# Patient Record
Sex: FEMALE | Race: WHITE | NOT HISPANIC OR LATINO | Employment: OTHER | URBAN - METROPOLITAN AREA
[De-identification: names, ages, dates, MRNs, and addresses within clinical notes are randomized per-mention and may not be internally consistent; named-entity substitution may affect disease eponyms.]

---

## 2017-06-18 ENCOUNTER — APPOINTMENT (OUTPATIENT)
Dept: RADIOLOGY | Facility: MEDICAL CENTER | Age: 62
DRG: 853 | End: 2017-06-18
Attending: HOSPITALIST
Payer: MEDICAID

## 2017-06-18 ENCOUNTER — RESOLUTE PROFESSIONAL BILLING HOSPITAL PROF FEE (OUTPATIENT)
Dept: HOSPITALIST | Facility: MEDICAL CENTER | Age: 62
End: 2017-06-18
Payer: MEDICAID

## 2017-06-18 ENCOUNTER — HOSPITAL ENCOUNTER (INPATIENT)
Facility: MEDICAL CENTER | Age: 62
LOS: 5 days | DRG: 853 | End: 2017-06-23
Attending: EMERGENCY MEDICINE | Admitting: HOSPITALIST
Payer: MEDICAID

## 2017-06-18 DIAGNOSIS — R44.3 HALLUCINATIONS: ICD-10-CM

## 2017-06-18 DIAGNOSIS — S41.111A: ICD-10-CM

## 2017-06-18 PROBLEM — N17.9 ARF (ACUTE RENAL FAILURE) (HCC): Status: ACTIVE | Noted: 2017-06-18

## 2017-06-18 PROBLEM — E87.29 INCREASED ANION GAP METABOLIC ACIDOSIS: Status: ACTIVE | Noted: 2017-06-18

## 2017-06-18 PROBLEM — S61.419A HAND LACERATION: Status: ACTIVE | Noted: 2017-06-18

## 2017-06-18 PROBLEM — C50.919 BREAST CANCER (HCC): Status: ACTIVE | Noted: 2017-06-18

## 2017-06-18 PROBLEM — G93.40 ACUTE ENCEPHALOPATHY: Status: ACTIVE | Noted: 2017-06-18

## 2017-06-18 PROBLEM — R65.10 SIRS (SYSTEMIC INFLAMMATORY RESPONSE SYNDROME) (HCC): Status: ACTIVE | Noted: 2017-06-18

## 2017-06-18 LAB
ALBUMIN SERPL BCP-MCNC: 4.1 G/DL (ref 3.2–4.9)
ALBUMIN/GLOB SERPL: 1.2 G/DL
ALP SERPL-CCNC: 99 U/L (ref 30–99)
ALT SERPL-CCNC: 10 U/L (ref 2–50)
ANION GAP SERPL CALC-SCNC: 17 MMOL/L (ref 0–11.9)
AST SERPL-CCNC: 34 U/L (ref 12–45)
BASOPHILS # BLD AUTO: 0.4 % (ref 0–1.8)
BASOPHILS # BLD: 0.08 K/UL (ref 0–0.12)
BILIRUB SERPL-MCNC: 0.9 MG/DL (ref 0.1–1.5)
BUN SERPL-MCNC: 47 MG/DL (ref 8–22)
CALCIUM SERPL-MCNC: 10 MG/DL (ref 8.5–10.5)
CHLORIDE SERPL-SCNC: 104 MMOL/L (ref 96–112)
CO2 SERPL-SCNC: 16 MMOL/L (ref 20–33)
CREAT SERPL-MCNC: 1.91 MG/DL (ref 0.5–1.4)
EOSINOPHIL # BLD AUTO: 0.03 K/UL (ref 0–0.51)
EOSINOPHIL NFR BLD: 0.1 % (ref 0–6.9)
ERYTHROCYTE [DISTWIDTH] IN BLOOD BY AUTOMATED COUNT: 54 FL (ref 35.9–50)
ETHANOL BLD-MCNC: 0 G/DL
GFR SERPL CREATININE-BSD FRML MDRD: 27 ML/MIN/1.73 M 2
GLOBULIN SER CALC-MCNC: 3.3 G/DL (ref 1.9–3.5)
GLUCOSE SERPL-MCNC: 121 MG/DL (ref 65–99)
HCT VFR BLD AUTO: 40.7 % (ref 37–47)
HGB BLD-MCNC: 13.8 G/DL (ref 12–16)
IMM GRANULOCYTES # BLD AUTO: 0.18 K/UL (ref 0–0.11)
IMM GRANULOCYTES NFR BLD AUTO: 0.9 % (ref 0–0.9)
LYMPHOCYTES # BLD AUTO: 0.79 K/UL (ref 1–4.8)
LYMPHOCYTES NFR BLD: 3.9 % (ref 22–41)
MCH RBC QN AUTO: 31.8 PG (ref 27–33)
MCHC RBC AUTO-ENTMCNC: 33.9 G/DL (ref 33.6–35)
MCV RBC AUTO: 93.8 FL (ref 81.4–97.8)
MONOCYTES # BLD AUTO: 0.94 K/UL (ref 0–0.85)
MONOCYTES NFR BLD AUTO: 4.7 % (ref 0–13.4)
NEUTROPHILS # BLD AUTO: 18.03 K/UL (ref 2–7.15)
NEUTROPHILS NFR BLD: 90 % (ref 44–72)
NRBC # BLD AUTO: 0 K/UL
NRBC BLD AUTO-RTO: 0 /100 WBC
PLATELET # BLD AUTO: 313 K/UL (ref 164–446)
PMV BLD AUTO: 10.5 FL (ref 9–12.9)
POTASSIUM SERPL-SCNC: 4.6 MMOL/L (ref 3.6–5.5)
PROT SERPL-MCNC: 7.4 G/DL (ref 6–8.2)
RBC # BLD AUTO: 4.34 M/UL (ref 4.2–5.4)
SODIUM SERPL-SCNC: 137 MMOL/L (ref 135–145)
WBC # BLD AUTO: 20.1 K/UL (ref 4.8–10.8)

## 2017-06-18 PROCEDURE — 700101 HCHG RX REV CODE 250

## 2017-06-18 PROCEDURE — 770022 HCHG ROOM/CARE - ICU (200)

## 2017-06-18 PROCEDURE — 500122 HCHG BOVIE, BLADE: Performed by: SPECIALIST

## 2017-06-18 PROCEDURE — 99223 1ST HOSP IP/OBS HIGH 75: CPT | Performed by: HOSPITALIST

## 2017-06-18 PROCEDURE — 160009 HCHG ANES TIME/MIN: Performed by: SPECIALIST

## 2017-06-18 PROCEDURE — 501838 HCHG SUTURE GENERAL: Performed by: SPECIALIST

## 2017-06-18 PROCEDURE — 501487 HCHG STRYKER TIP: Performed by: SPECIALIST

## 2017-06-18 PROCEDURE — 36415 COLL VENOUS BLD VENIPUNCTURE: CPT

## 2017-06-18 PROCEDURE — 500423 HCHG DRESSING, ABD COMBINE: Performed by: SPECIALIST

## 2017-06-18 PROCEDURE — 700111 HCHG RX REV CODE 636 W/ 250 OVERRIDE (IP)

## 2017-06-18 PROCEDURE — 500881 HCHG PACK, EXTREMITY: Performed by: SPECIALIST

## 2017-06-18 PROCEDURE — 99291 CRITICAL CARE FIRST HOUR: CPT

## 2017-06-18 PROCEDURE — 85025 COMPLETE CBC W/AUTO DIFF WBC: CPT

## 2017-06-18 PROCEDURE — 80053 COMPREHEN METABOLIC PANEL: CPT

## 2017-06-18 PROCEDURE — 160002 HCHG RECOVERY MINUTES (STAT): Performed by: SPECIALIST

## 2017-06-18 PROCEDURE — 160036 HCHG PACU - EA ADDL 30 MINS PHASE I: Performed by: SPECIALIST

## 2017-06-18 PROCEDURE — 160048 HCHG OR STATISTICAL LEVEL 1-5: Performed by: SPECIALIST

## 2017-06-18 PROCEDURE — 501486 HCHG STRYKER IRRIG SET HC W/TUBING: Performed by: SPECIALIST

## 2017-06-18 PROCEDURE — 500128 HCHG BOVIE, NEEDLE TIP 3CM: Performed by: SPECIALIST

## 2017-06-18 PROCEDURE — 160039 HCHG SURGERY MINUTES - EA ADDL 1 MIN LEVEL 3: Performed by: SPECIALIST

## 2017-06-18 PROCEDURE — 502240 HCHG MISC OR SUPPLY RC 0272: Performed by: SPECIALIST

## 2017-06-18 PROCEDURE — 160028 HCHG SURGERY MINUTES - 1ST 30 MINS LEVEL 3: Performed by: SPECIALIST

## 2017-06-18 PROCEDURE — 160035 HCHG PACU - 1ST 60 MINS PHASE I: Performed by: SPECIALIST

## 2017-06-18 PROCEDURE — 80307 DRUG TEST PRSMV CHEM ANLYZR: CPT

## 2017-06-18 PROCEDURE — A6223 GAUZE >16<=48 NO W/SAL W/O B: HCPCS | Performed by: SPECIALIST

## 2017-06-18 PROCEDURE — 500440 HCHG DRESSING, STERILE ROLL (KERLIX): Performed by: SPECIALIST

## 2017-06-18 RX ORDER — MORPHINE SULFATE 4 MG/ML
2 INJECTION, SOLUTION INTRAMUSCULAR; INTRAVENOUS
Status: DISCONTINUED | OUTPATIENT
Start: 2017-06-18 | End: 2017-06-23 | Stop reason: HOSPADM

## 2017-06-18 RX ORDER — ONDANSETRON 2 MG/ML
4 INJECTION INTRAMUSCULAR; INTRAVENOUS EVERY 4 HOURS PRN
Status: DISCONTINUED | OUTPATIENT
Start: 2017-06-18 | End: 2017-06-23 | Stop reason: HOSPADM

## 2017-06-18 RX ORDER — BUPIVACAINE HYDROCHLORIDE 5 MG/ML
30 INJECTION, SOLUTION EPIDURAL; INTRACAUDAL ONCE
Status: COMPLETED | OUTPATIENT
Start: 2017-06-18 | End: 2017-06-18

## 2017-06-18 RX ORDER — ONDANSETRON 4 MG/1
4 TABLET, ORALLY DISINTEGRATING ORAL EVERY 4 HOURS PRN
Status: DISCONTINUED | OUTPATIENT
Start: 2017-06-18 | End: 2017-06-23 | Stop reason: HOSPADM

## 2017-06-18 RX ORDER — OXYCODONE HYDROCHLORIDE 5 MG/1
2.5 TABLET ORAL
Status: DISCONTINUED | OUTPATIENT
Start: 2017-06-18 | End: 2017-06-23 | Stop reason: HOSPADM

## 2017-06-18 RX ORDER — LIDOCAINE HYDROCHLORIDE AND EPINEPHRINE BITARTRATE 20; .01 MG/ML; MG/ML
20 INJECTION, SOLUTION SUBCUTANEOUS ONCE
Status: COMPLETED | OUTPATIENT
Start: 2017-06-18 | End: 2017-06-18

## 2017-06-18 RX ORDER — PROMETHAZINE HYDROCHLORIDE 25 MG/1
12.5-25 SUPPOSITORY RECTAL EVERY 4 HOURS PRN
Status: DISCONTINUED | OUTPATIENT
Start: 2017-06-18 | End: 2017-06-23 | Stop reason: HOSPADM

## 2017-06-18 RX ORDER — BUPIVACAINE HYDROCHLORIDE 5 MG/ML
INJECTION, SOLUTION EPIDURAL; INTRACAUDAL
Status: COMPLETED
Start: 2017-06-18 | End: 2017-06-18

## 2017-06-18 RX ORDER — OXYCODONE HYDROCHLORIDE 5 MG/1
5 TABLET ORAL
Status: DISCONTINUED | OUTPATIENT
Start: 2017-06-18 | End: 2017-06-23 | Stop reason: HOSPADM

## 2017-06-18 RX ORDER — SODIUM CHLORIDE 9 MG/ML
INJECTION, SOLUTION INTRAVENOUS CONTINUOUS
Status: DISCONTINUED | OUTPATIENT
Start: 2017-06-18 | End: 2017-06-20

## 2017-06-18 RX ORDER — POLYETHYLENE GLYCOL 3350 17 G/17G
1 POWDER, FOR SOLUTION ORAL
Status: DISCONTINUED | OUTPATIENT
Start: 2017-06-18 | End: 2017-06-23 | Stop reason: HOSPADM

## 2017-06-18 RX ORDER — MAGNESIUM HYDROXIDE 1200 MG/15ML
LIQUID ORAL
Status: DISCONTINUED | OUTPATIENT
Start: 2017-06-18 | End: 2017-06-19 | Stop reason: HOSPADM

## 2017-06-18 RX ORDER — PROMETHAZINE HYDROCHLORIDE 25 MG/1
12.5-25 TABLET ORAL EVERY 4 HOURS PRN
Status: DISCONTINUED | OUTPATIENT
Start: 2017-06-18 | End: 2017-06-23 | Stop reason: HOSPADM

## 2017-06-18 RX ORDER — MIDAZOLAM HYDROCHLORIDE 1 MG/ML
INJECTION INTRAMUSCULAR; INTRAVENOUS
Status: COMPLETED
Start: 2017-06-18 | End: 2017-06-19

## 2017-06-18 RX ORDER — LIDOCAINE HYDROCHLORIDE AND EPINEPHRINE BITARTRATE 20; .01 MG/ML; MG/ML
INJECTION, SOLUTION SUBCUTANEOUS
Status: COMPLETED
Start: 2017-06-18 | End: 2017-06-18

## 2017-06-18 RX ORDER — HEPARIN SODIUM 5000 [USP'U]/ML
5000 INJECTION, SOLUTION INTRAVENOUS; SUBCUTANEOUS EVERY 8 HOURS
Status: DISCONTINUED | OUTPATIENT
Start: 2017-06-19 | End: 2017-06-20

## 2017-06-18 RX ORDER — AMOXICILLIN 250 MG
2 CAPSULE ORAL 2 TIMES DAILY
Status: DISCONTINUED | OUTPATIENT
Start: 2017-06-18 | End: 2017-06-23 | Stop reason: HOSPADM

## 2017-06-18 RX ORDER — HALOPERIDOL 5 MG/ML
1 INJECTION INTRAMUSCULAR EVERY 4 HOURS PRN
Status: DISCONTINUED | OUTPATIENT
Start: 2017-06-18 | End: 2017-06-19

## 2017-06-18 RX ORDER — BISACODYL 10 MG
10 SUPPOSITORY, RECTAL RECTAL
Status: DISCONTINUED | OUTPATIENT
Start: 2017-06-18 | End: 2017-06-23 | Stop reason: HOSPADM

## 2017-06-18 RX ORDER — HALOPERIDOL 5 MG/ML
INJECTION INTRAMUSCULAR
Status: COMPLETED
Start: 2017-06-18 | End: 2017-06-19

## 2017-06-18 RX ADMIN — MIDAZOLAM 1 MG: 1 INJECTION INTRAMUSCULAR; INTRAVENOUS at 23:50

## 2017-06-18 RX ADMIN — BUPIVACAINE HYDROCHLORIDE 30 ML: 5 INJECTION, SOLUTION EPIDURAL; INTRACAUDAL at 19:03

## 2017-06-18 RX ADMIN — LIDOCAINE HYDROCHLORIDE AND EPINEPHRINE 20 ML: 20; 10 INJECTION, SOLUTION INFILTRATION; PERINEURAL at 19:03

## 2017-06-18 RX ADMIN — HALOPERIDOL LACTATE 1 MG: 5 INJECTION, SOLUTION INTRAMUSCULAR at 23:55

## 2017-06-18 RX ADMIN — BUPIVACAINE HYDROCHLORIDE 30 ML: 5 INJECTION, SOLUTION EPIDURAL; INTRACAUDAL; PERINEURAL at 19:03

## 2017-06-18 RX ADMIN — LIDOCAINE HYDROCHLORIDE AND EPINEPHRINE BITARTRATE 20 ML: 20; .01 INJECTION, SOLUTION SUBCUTANEOUS at 19:03

## 2017-06-18 ASSESSMENT — LIFESTYLE VARIABLES
VISUAL DISTURBANCES: VERY MILD SENSITIVITY
AGITATION: MODERATELY FIDGETY AND RESTLESS
DO YOU DRINK ALCOHOL: YES
AUDITORY DISTURBANCES: MILD HARSHNESS OR ABILITY TO FRIGHTEN
TREMOR: NO TREMOR
TOTAL SCORE: 4
TACTILE DISTURBANCES: MILD ITCHING, PINS AND NEEDLES SENSATION, BURNING OR NUMBNESS
DOES PATIENT WANT TO STOP DRINKING: CANNOT ASSESS
NAUSEA AND VOMITING: NO NAUSEA AND NO VOMITING
TOTAL SCORE: 4
EVER FELT BAD OR GUILTY ABOUT YOUR DRINKING: YES
TOTAL SCORE: 13
HAVE YOU EVER FELT YOU SHOULD CUT DOWN ON YOUR DRINKING: YES
CONSUMPTION TOTAL: INCOMPLETE
EVER HAD A DRINK FIRST THING IN THE MORNING TO STEADY YOUR NERVES TO GET RID OF A HANGOVER: YES
PAROXYSMAL SWEATS: NO SWEAT VISIBLE
HEADACHE, FULLNESS IN HEAD: NOT PRESENT
HAVE PEOPLE ANNOYED YOU BY CRITICIZING YOUR DRINKING: YES
TOTAL SCORE: 4
ANXIETY: *
ORIENTATION AND CLOUDING OF SENSORIUM: CANNOT DO SERIAL ADDITIONS OR IS UNCERTAIN ABOUT DATE

## 2017-06-18 ASSESSMENT — PAIN SCALES - GENERAL
PAINLEVEL_OUTOF10: 0
PAINLEVEL_OUTOF10: 5

## 2017-06-18 NOTE — IP AVS SNAPSHOT
6/23/2017    Dougie Baptiste  [address] [facility] [address]    Dear Dougie:    [facility] wants to ensure your discharge home is safe and you or your loved ones have had all of your questions answered regarding your care after you leave the hospital.    Below is a list of resources and contact information should you have any questions regarding your hospital stay, follow-up instructions, or active medical symptoms.    Questions or Concerns Regarding… Contact   Medical Questions Related to Your Discharge  (7 days a week, 8am-5pm) Contact a Nurse Care Coordinator   [phone]   Medical Questions Not Related to Your Discharge  (24 hours a day / 7 days a week)  Contact the Nurse Health Line   [phone]    Medications or Discharge Instructions Refer to your discharge packet   or contact your [facility] Primary Care Provider   [phone]   Follow-up Appointment(s) Schedule your appointment via [employer]   or contact Scheduling [phone]   Billing Review your statement via [employer]  or contact Billing [phone]   Medical Records Review your records via [employer]   or contact Medical Records [phone]     You may receive a telephone call within two days of discharge. This call is to make certain you understand your discharge instructions and have the opportunity to have any questions answered. You can also easily access your medical information, test results and upcoming appointments via the [employer] free online health management tool. You can learn more and sign up at [employer]. For assistance setting up your [employer] account, please call [phone].    Once again, we want to ensure your discharge home is safe and that you have a clear understanding of any next steps in your care. If you have any questions or concerns, please do not hesitate to contact us, we are here for you. Thank you for choosing [facility] for your healthcare needs.    Sincerely,    Your [facility] Healthcare Team

## 2017-06-18 NOTE — IP AVS SNAPSHOT
Worldscape Access Code: [email or web address]  Expires: 7/23/2017  3:24 PM    Your email address is not on file at [facility].  Email Addresses are required for you to sign up for Worldscape, please contact [phone] to verify your personal information and to provide your email address prior to attempting to register for Worldscape.    Dougie Baptiste  [address] [place] [address]    Worldscape  A secure, online tool to manage your health information     [facility]’s Worldscape® is a secure, online tool that connects you to your personalized health information from the privacy of your home -- day or night - making it very easy for you to manage your healthcare. Once the activation process is completed, you can even access your medical information using the Worldscape clarke, which is available for free in the Apple Clarke store or Google Play store.     To learn more about Worldscape, visit [email or web address]    There are two levels of access available (as shown below):   My Chart Features  [facility] Primary Care Doctor [facility]  Specialists [facility]  Urgent  Care Non-[facility] Primary Care Doctor   Email your healthcare team securely and privately 24/7 X X X    Manage appointments: schedule your next appointment; view details of past/upcoming appointments X      Request prescription refills. X      View recent personal medical records, including lab and immunizations X X X X   View health record, including health history, allergies, medications X X X X   Read reports about your outpatient visits, procedures, consult and ER notes X X X X   See your discharge summary, which is a recap of your hospital and/or ER visit that includes your diagnosis, lab results, and care plan X X  X     How to register for Worldscape:  Once your e-mail address has been verified, follow the following steps to sign up for Worldscape.     1. Go to  [email or web address]  2. Click on the Sign Up Now box, which takes you to the New Member Sign Up page. You  will need to provide the following information:  a. Enter your Instant API Access Code exactly as it appears at the top of this page. (You will not need to use this code after you’ve completed the sign-up process. If you do not sign up before the expiration date, you must request a new code.)   b. Enter your date of birth.   c. Enter your home email address.   d. Click Submit, and follow the next screen’s instructions.  3. Create a ZenPayrollt ID. This will be your Instant API login ID and cannot be changed, so think of one that is secure and easy to remember.  4. Create a Instant API password. You can change your password at any time.  5. Enter your Password Reset Question and Answer. This can be used at a later time if you forget your password.   6. Enter your e-mail address. This allows you to receive e-mail notifications when new information is available in Instant API.  7. Click Sign Up. You can now view your health information.    For assistance activating your Instant API account, call [phone]

## 2017-06-18 NOTE — IP AVS SNAPSHOT
" <p align=\"LEFT\"><IMG SRC=\"//[email or web address]\" alt=\"Image\" WIDTH=\"50%\" HEIGHT=\"200\" BORDER=\"\"></p>                   Name:Dougie Baptiste  Medical Record Number:[id number]  CSN: [id number]    YOB: 1955   Age: 62 y.o.  Sex: female  HT:1.676 m (5' 6\") WT: 50.9 kg (112 lb 3.4 oz)          Admit Date: 6/18/2017     Discharge Date:   Today's Date: 6/23/2017  Attending Doctor:  Bret Ramires M.D.                  Allergies:  Review of patient's allergies indicates no known allergies.          Follow-up Information     1. Follow up with Mert Madsen M.D.. Go on 7/10/2017.    Specialty:  Plastic Surgery    Why:  For suture removal at 2:15 PM    Contact information    [address]  [place] [address] [phone]           Medication List      Take these Medications        Instructions    cephALEXin 250 MG Caps   Commonly known as:  KEFLEX    Take 1 Cap by mouth every 6 hours for 6 days.   Dose:  250 mg       Probiotic 250 MG Caps    Take 1 Cap by mouth.   Dose:  1 Cap         "

## 2017-06-18 NOTE — IP AVS SNAPSHOT
" Home Care Instructions                                                                                                                  Name:Dougie Baptiste  Medical Record Number:[id number]  CSN: [id number]    YOB: 1955   Age: 62 y.o.  Sex: female  HT:1.676 m (5' 6\") WT: 50.9 kg (112 lb 3.4 oz)          Admit Date: 6/18/2017     Discharge Date:   Today's Date: 6/23/2017  Attending Doctor:  Bret Ramires M.D.                  Allergies:  Review of patient's allergies indicates no known allergies.            Discharge Instructions       Discharge Instructions    Discharged to home by taxi with escort. Discharged via walking, hospital escort: Refused.  Special equipment needed: Not Applicable    Be sure to schedule a follow-up appointment with your primary care doctor or any specialists as instructed.     Discharge Plan:   Diet Plan: Discussed  Activity Level: Discussed  Smoking Cessation Offered: Patient Counseled  Confirmed Follow up Appointment: Appointment Scheduled  Confirmed Symptoms Management: Discussed  Medication Reconciliation Updated: Yes  Pneumococcal Vaccine Given - only chart on this line when given:  (Pt refuses)  Influenza Vaccine Indication: Patient Refuses    I understand that a diet low in cholesterol, fat, and sodium is recommended for good health. Unless I have been given specific instructions below for another diet, I accept this instruction as my diet prescription.   Other diet: Regular    Special Instructions: None    · Is patient discharged on Warfarin / Coumadin?   No     · Is patient Post Blood Transfusion?  No    Laceration Care, Adult  A laceration is a cut that goes through all layers of the skin. The cut also goes into the tissue that is right under the skin. Some cuts heal on their own. Others need to be closed with stitches (sutures), staples, skin adhesive strips, or wound glue. Taking care of your cut lowers your risk of infection and helps your cut to heal better.  HOW TO TAKE " CARE OF YOUR CUT  For stitches or staples:  · Keep the wound clean and dry.  · If you were given a bandage (dressing), you should change it at least one time per day or as told by your doctor. You should also change it if it gets wet or dirty.  · Keep the wound completely dry for the first 24 hours or as told by your doctor. After that time, you may take a shower or a bath. However, make sure that the wound is not soaked in water until after the stitches or staples have been removed.  · Clean the wound one time each day or as told by your doctor:  ¨ Wash the wound with soap and water.  ¨ Rinse the wound with water until all of the soap comes off.  ¨ Pat the wound dry with a clean towel. Do not rub the wound.  · After you clean the wound, put a thin layer of antibiotic ointment on it as told by your doctor. This ointment:  ¨ Helps to prevent infection.  ¨ Keeps the bandage from sticking to the wound.  · Have your stitches or staples removed as told by your doctor.  If your doctor used skin adhesive strips:   · Keep the wound clean and dry.  · If you were given a bandage, you should change it at least one time per day or as told by your doctor. You should also change it if it gets dirty or wet.  · Do not get the skin adhesive strips wet. You can take a shower or a bath, but be careful to keep the wound dry.  · If the wound gets wet, pat it dry with a clean towel. Do not rub the wound.  · Skin adhesive strips fall off on their own. You can trim the strips as the wound heals. Do not remove any strips that are still stuck to the wound. They will fall off after a while.  If your doctor used wound glue:  · Try to keep your wound dry, but you may briefly wet it in the shower or bath. Do not soak the wound in water, such as by swimming.  · After you take a shower or a bath, gently pat the wound dry with a clean towel. Do not rub the wound.  · Do not do any activities that will make you really sweaty until the skin glue has  fallen off on its own.  · Do not apply liquid, cream, or ointment medicine to your wound while the skin glue is still on.  · If you were given a bandage, you should change it at least one time per day or as told by your doctor. You should also change it if it gets dirty or wet.  · If a bandage is placed over the wound, do not let the tape for the bandage touch the skin glue.  · Do not pick at the glue. The skin glue usually stays on for 5-10 days. Then, it falls off of the skin.  General Instructions   · To help prevent scarring, make sure to cover your wound with sunscreen whenever you are outside after stitches are removed, after adhesive strips are removed, or when wound glue stays in place and the wound is healed. Make sure to wear a sunscreen of at least 30 SPF.  · Take over-the-counter and prescription medicines only as told by your doctor.  · If you were given antibiotic medicine or ointment, take or apply it as told by your doctor. Do not stop using the antibiotic even if your wound is getting better.  · Do not scratch or pick at the wound.  · Keep all follow-up visits as told by your doctor. This is important.  · Check your wound every day for signs of infection. Watch for:  ¨ Redness, swelling, or pain.  ¨ Fluid, blood, or pus.  · Raise (elevate) the injured area above the level of your heart while you are sitting or lying down, if possible.  GET HELP IF:  · You got a tetanus shot and you have any of these problems at the injection site:  ¨ Swelling.  ¨ Very bad pain.  ¨ Redness.  ¨ Bleeding.  · You have a fever.  · A wound that was closed breaks open.  · You notice a bad smell coming from your wound or your bandage.  · You notice something coming out of the wound, such as wood or glass.  · Medicine does not help your pain.  · You have more redness, swelling, or pain at the site of your wound.  · You have fluid, blood, or pus coming from your wound.  · You notice a change in the color of your skin near  your wound.  · You need to change the bandage often because fluid, blood, or pus is coming from the wound.  · You start to have a new rash.  · You start to have numbness around the wound.  GET HELP RIGHT AWAY IF:  · You have very bad swelling around the wound.  · Your pain suddenly gets worse and is very bad.  · You notice painful lumps near the wound or on skin that is anywhere on your body.  · You have a red streak going away from your wound.  · The wound is on your hand or foot and you cannot move a finger or toe like you usually can.  · The wound is on your hand or foot and you notice that your fingers or toes look pale or bluish.     This information is not intended to replace advice given to you by your health care provider. Make sure you discuss any questions you have with your health care provider.     Document Released: 06/05/2009 Document Revised: 05/03/2016 Document Reviewed: 12/14/2015  [employer] Interactive Patient Education ©2016 Elsevier Inc.      Wound Care  Taking care of your wound properly can help to prevent pain and infection. It can also help your wound to heal more quickly.   HOW TO CARE FOR YOUR WOUND   · Take or apply over-the-counter and prescription medicines only as told by your health care provider.  · If you were prescribed antibiotic medicine, take or apply it as told by your health care provider. Do not stop using the antibiotic even if your condition improves.  · Clean the wound each day or as told by your health care provider.  ¨ Wash the wound with mild soap and water.  ¨ Rinse the wound with water to remove all soap.  ¨ Pat the wound dry with a clean towel. Do not rub it.  · There are many different ways to close and cover a wound. For example, a wound can be covered with stitches (sutures), skin glue, or adhesive strips. Follow instructions from your health care provider about:  ¨ How to take care of your wound.  ¨ When and how you should change your bandage (dressing).  ¨ When  you should remove your dressing.  ¨ Removing whatever was used to close your wound.  · Check your wound every day for signs of infection. Watch for:  ¨ Redness, swelling, or pain.  ¨ Fluid, blood, or pus.  · Keep the dressing dry until your health care provider says it can be removed. Do not take baths, swim, use a hot tub, or do anything that would put your wound underwater until your health care provider approves.  · Raise (elevate) the injured area above the level of your heart while you are sitting or lying down.  · Do not scratch or pick at the wound.  · Keep all follow-up visits as told by your health care provider. This is important.  SEEK MEDICAL CARE IF:  · You received a tetanus shot and you have swelling, severe pain, redness, or bleeding at the injection site.  · You have a fever.  · Your pain is not controlled with medicine.  · You have increased redness, swelling, or pain at the site of your wound.  · You have fluid, blood, or pus coming from your wound.  · You notice a bad smell coming from your wound or your dressing.  SEEK IMMEDIATE MEDICAL CARE IF:  · You have a red streak going away from your wound.     This information is not intended to replace advice given to you by your health care provider. Make sure you discuss any questions you have with your health care provider.     Document Released: 09/26/2009 Document Revised: 05/03/2016 Document Reviewed: 12/14/2015  [employer] Interactive Patient Education ©2016 [employer]      Depression / Suicide Risk    As you are discharged from this [facility] facility, it is important to learn how to keep safe from harming yourself.    Recognize the warning signs:  · Abrupt changes in personality, positive or negative- including increase in energy   · Giving away possessions  · Change in eating patterns- significant weight changes-  positive or negative  · Change in sleeping patterns- unable to sleep or sleeping all the time   · Unwillingness or inability  to communicate  · Depression  · Unusual sadness, discouragement and loneliness  · Talk of wanting to die  · Neglect of personal appearance   · Rebelliousness- reckless behavior  · Withdrawal from people/activities they love  · Confusion- inability to concentrate     If you or a loved one observes any of these behaviors or has concerns about self-harm, here's what you can do:  · Talk about it- your feelings and reasons for harming yourself  · Remove any means that you might use to hurt yourself (examples: pills, rope, extension cords, firearm)  · Get professional help from the community (Mental Health, Substance Abuse, psychological counseling)  · Do not be alone:Call your Safe Contact- someone whom you trust who will be there for you.  · Call your local CRISIS HOTLINE 058-1235 or 360-041-5454  · Call your local Children's Mobile Crisis Response Team [place] (906) 510-9677 or www.PureWRX  · Call the toll free National Suicide Prevention Hotlines   · National Suicide Prevention Lifeline 749-944-ONPU (3400)  · [employer] Hope Line Network 800-SUICIDE (733-3275)        Follow-up Information     1. Follow up with Mert Madsen M.D.. Go on 7/10/2017.    Specialty:  Plastic Surgery    Why:  For suture removal at 2:15 PM    Contact information    [address]  [place] [address]  [phone]           Discharge Medication Instructions:    Below are the medications your physician expects you to take upon discharge:    Review all your home medications and newly ordered medications with your doctor and/or pharmacist. Follow medication instructions as directed by your doctor and/or pharmacist.    Please keep your medication list with you and share with your physician.               Medication List      START taking these medications        Instructions    Morning Afternoon Evening Bedtime    cephALEXin 250 MG Caps   Last time this was given:  250 mg on 6/23/2017 12:21 PM   Commonly known as:  KEFLEX         Take 1 Cap by mouth every 6 hours for 6 days.   Dose:  250 mg                          CONTINUE taking these medications        Instructions    Morning Afternoon Evening Bedtime    Probiotic 250 MG Caps        Take 1 Cap by mouth.   Dose:  1 Cap                             Where to Get Your Medications      Information about where to get these medications is not yet available     ! Ask your nurse or doctor about these medications    - cephALEXin 250 MG Caps            Instructions           Diet / Nutrition:    Follow any diet instructions given to you by your doctor or the dietician, including how much salt (sodium) you are allowed each day.    If you are overweight, talk to your doctor about a weight reduction plan.    Activity:    Remain physically active following your doctor's instructions about exercise and activity.    Rest often.     Any time you become even a little tired or short of breath, SIT DOWN and rest.    Worsening Symptoms:    Report any of the following signs and symptoms to the doctor's office immediately:    *Pain of jaw, arm, or neck  *Chest pain not relieved by medication                               *Dizziness or loss of consciousness  *Difficulty breathing even when at rest   *More tired than usual                                       *Bleeding drainage or swelling of surgical site  *Swelling of feet, ankles, legs or stomach                 *Fever (>100ºF)  *Pink or blood tinged sputum  *Weight gain (3lbs/day or 5lbs /week)           *Shock from internal defibrillator (if applicable)  *Palpitations or irregular heartbeats                *Cool and/or numb extremities    Stroke Awareness    Common Risk Factors for Stroke include:    Age  Atrial Fibrillation  Carotid Artery Stenosis  Diabetes Mellitus  Excessive alcohol consumption  High blood pressure  Overweight   Physical inactivity  Smoking    Warning signs and symptoms of a stroke include:    *Sudden numbness or weakness of the face, arm  or leg (especially on one side of the body).  *Sudden confusion, trouble speaking or understanding.  *Sudden trouble seeing in one or both eyes.  *Sudden trouble walking, dizziness, loss of balance or coordination.Sudden severe headache with no known cause.    It is very important to get treatment quickly when a stroke occurs. If you experience any of the above warning signs, call 911 immediately.                   Disclaimer         Quit Smoking / Tobacco Use:    I understand the use of any tobacco products increases my chance of suffering from future heart disease or stroke and could cause other illnesses which may shorten my life. Quitting the use of tobacco products is the single most important thing I can do to improve my health. For further information on smoking / tobacco cessation call a Toll Free Quit Line at 1-487.744.8053 (*National Cancer [place]) or 1-521.903.2487 (American Lung Association) or you can access the web based program at www.lungDigital Alliance.org.    [place] Tobacco Users Help Line:  (196) 597-9752       Toll Free: 1-196.476.1945  Quit Tobacco Program [facility] (845)827-1241    Crisis Hotline:    [place] Crisis Hotline:  9-392-HDHAOGT or 1-195.504.9314    [place] Crisis Hotline:    1-953.566.9366 or 072-628-6152    Discharge Survey:   Thank you for choosing [facility]. We hope we did everything we could to make your hospital stay a pleasant one. You may be receiving a phone survey and we would appreciate your time and participation in answering the questions. Your input is very valuable to us in our efforts to improve our service to our patients and their families.        My signature on this form indicates that:    1. I have reviewed and understand the above information.  2. My questions regarding this information have been answered to my satisfaction.  3. I have formulated a plan with my discharge nurse to obtain my prescribed medications for home.                     Disclaimer         __________________________________                     __________       ________                       Patient Signature                                                 Date                    Time

## 2017-06-19 PROBLEM — A41.9 SEPSIS, UNSPECIFIED: Status: ACTIVE | Noted: 2017-06-19

## 2017-06-19 LAB
AMPHET UR QL SCN: POSITIVE
ANION GAP SERPL CALC-SCNC: 11 MMOL/L (ref 0–11.9)
APPEARANCE UR: ABNORMAL
BACTERIA #/AREA URNS HPF: ABNORMAL /HPF
BARBITURATES UR QL SCN: NEGATIVE
BASOPHILS # BLD AUTO: 0.3 % (ref 0–1.8)
BASOPHILS # BLD: 0.05 K/UL (ref 0–0.12)
BENZODIAZ UR QL SCN: POSITIVE
BILIRUB UR QL STRIP.AUTO: NEGATIVE
BUN SERPL-MCNC: 48 MG/DL (ref 8–22)
BZE UR QL SCN: NEGATIVE
CALCIUM SERPL-MCNC: 8.5 MG/DL (ref 8.5–10.5)
CANNABINOIDS UR QL SCN: NEGATIVE
CHLORIDE SERPL-SCNC: 106 MMOL/L (ref 96–112)
CO2 SERPL-SCNC: 19 MMOL/L (ref 20–33)
COLOR UR: YELLOW
CREAT SERPL-MCNC: 1.62 MG/DL (ref 0.5–1.4)
EKG IMPRESSION: NORMAL
EOSINOPHIL # BLD AUTO: 0.01 K/UL (ref 0–0.51)
EOSINOPHIL NFR BLD: 0.1 % (ref 0–6.9)
EPI CELLS #/AREA URNS HPF: ABNORMAL /HPF
ERYTHROCYTE [DISTWIDTH] IN BLOOD BY AUTOMATED COUNT: 53.4 FL (ref 35.9–50)
GFR SERPL CREATININE-BSD FRML MDRD: 32 ML/MIN/1.73 M 2
GLUCOSE SERPL-MCNC: 157 MG/DL (ref 65–99)
GLUCOSE UR STRIP.AUTO-MCNC: NEGATIVE MG/DL
HCT VFR BLD AUTO: 32.7 % (ref 37–47)
HGB BLD-MCNC: 11.1 G/DL (ref 12–16)
HYALINE CASTS #/AREA URNS LPF: ABNORMAL /LPF
IMM GRANULOCYTES # BLD AUTO: 0.06 K/UL (ref 0–0.11)
IMM GRANULOCYTES NFR BLD AUTO: 0.4 % (ref 0–0.9)
KETONES UR STRIP.AUTO-MCNC: 10 MG/DL
LACTATE BLD-SCNC: 1.2 MMOL/L (ref 0.5–2)
LACTATE BLD-SCNC: 1.6 MMOL/L (ref 0.5–2)
LEUKOCYTE ESTERASE UR QL STRIP.AUTO: ABNORMAL
LYMPHOCYTES # BLD AUTO: 1 K/UL (ref 1–4.8)
LYMPHOCYTES NFR BLD: 7 % (ref 22–41)
MCH RBC QN AUTO: 31.4 PG (ref 27–33)
MCHC RBC AUTO-ENTMCNC: 33.9 G/DL (ref 33.6–35)
MCV RBC AUTO: 92.4 FL (ref 81.4–97.8)
MDMA UR QL SCN: NEGATIVE
METHADONE UR QL SCN: NEGATIVE
MICRO URNS: ABNORMAL
MONOCYTES # BLD AUTO: 0.8 K/UL (ref 0–0.85)
MONOCYTES NFR BLD AUTO: 5.6 % (ref 0–13.4)
MUCOUS THREADS #/AREA URNS HPF: ABNORMAL /HPF
NEUTROPHILS # BLD AUTO: 12.4 K/UL (ref 2–7.15)
NEUTROPHILS NFR BLD: 86.6 % (ref 44–72)
NITRITE UR QL STRIP.AUTO: NEGATIVE
NRBC # BLD AUTO: 0 K/UL
NRBC BLD AUTO-RTO: 0 /100 WBC
OPIATES UR QL SCN: NEGATIVE
OXYCODONE UR QL SCN: NEGATIVE
PCP UR QL SCN: NEGATIVE
PH UR STRIP.AUTO: 5 [PH]
PLATELET # BLD AUTO: 254 K/UL (ref 164–446)
PMV BLD AUTO: 10 FL (ref 9–12.9)
POTASSIUM SERPL-SCNC: 3.8 MMOL/L (ref 3.6–5.5)
PROPOXYPH UR QL SCN: NEGATIVE
PROT UR QL STRIP: NEGATIVE MG/DL
RBC # BLD AUTO: 3.54 M/UL (ref 4.2–5.4)
RBC # URNS HPF: ABNORMAL /HPF
RBC UR QL AUTO: NEGATIVE
SODIUM SERPL-SCNC: 136 MMOL/L (ref 135–145)
SP GR UR STRIP.AUTO: 1.02
WBC # BLD AUTO: 14.3 K/UL (ref 4.8–10.8)
WBC #/AREA URNS HPF: ABNORMAL /HPF

## 2017-06-19 PROCEDURE — 80048 BASIC METABOLIC PNL TOTAL CA: CPT

## 2017-06-19 PROCEDURE — 81001 URINALYSIS AUTO W/SCOPE: CPT

## 2017-06-19 PROCEDURE — G8996 SWALLOW CURRENT STATUS: HCPCS | Mod: CI

## 2017-06-19 PROCEDURE — 700102 HCHG RX REV CODE 250 W/ 637 OVERRIDE(OP): Performed by: HOSPITALIST

## 2017-06-19 PROCEDURE — 92610 EVALUATE SWALLOWING FUNCTION: CPT

## 2017-06-19 PROCEDURE — 87040 BLOOD CULTURE FOR BACTERIA: CPT

## 2017-06-19 PROCEDURE — 93010 ELECTROCARDIOGRAM REPORT: CPT | Performed by: INTERNAL MEDICINE

## 2017-06-19 PROCEDURE — 0LQ50ZZ REPAIR RIGHT LOWER ARM AND WRIST TENDON, OPEN APPROACH: ICD-10-PCS | Performed by: SPECIALIST

## 2017-06-19 PROCEDURE — 01Q40ZZ REPAIR ULNAR NERVE, OPEN APPROACH: ICD-10-PCS | Performed by: SPECIALIST

## 2017-06-19 PROCEDURE — 87086 URINE CULTURE/COLONY COUNT: CPT

## 2017-06-19 PROCEDURE — 93005 ELECTROCARDIOGRAM TRACING: CPT | Performed by: HOSPITALIST

## 2017-06-19 PROCEDURE — 83605 ASSAY OF LACTIC ACID: CPT

## 2017-06-19 PROCEDURE — 70450 CT HEAD/BRAIN W/O DYE: CPT

## 2017-06-19 PROCEDURE — A9270 NON-COVERED ITEM OR SERVICE: HCPCS | Performed by: HOSPITALIST

## 2017-06-19 PROCEDURE — 90471 IMMUNIZATION ADMIN: CPT

## 2017-06-19 PROCEDURE — 0JCG0ZZ EXTIRPATION OF MATTER FROM RIGHT LOWER ARM SUBCUTANEOUS TISSUE AND FASCIA, OPEN APPROACH: ICD-10-PCS | Performed by: SPECIALIST

## 2017-06-19 PROCEDURE — 700111 HCHG RX REV CODE 636 W/ 250 OVERRIDE (IP): Performed by: HOSPITALIST

## 2017-06-19 PROCEDURE — 700105 HCHG RX REV CODE 258: Performed by: HOSPITALIST

## 2017-06-19 PROCEDURE — G8997 SWALLOW GOAL STATUS: HCPCS | Mod: CI

## 2017-06-19 PROCEDURE — 85025 COMPLETE CBC W/AUTO DIFF WBC: CPT

## 2017-06-19 PROCEDURE — 90715 TDAP VACCINE 7 YRS/> IM: CPT | Performed by: HOSPITALIST

## 2017-06-19 PROCEDURE — 3E0234Z INTRODUCTION OF SERUM, TOXOID AND VACCINE INTO MUSCLE, PERCUTANEOUS APPROACH: ICD-10-PCS | Performed by: HOSPITALIST

## 2017-06-19 PROCEDURE — 770001 HCHG ROOM/CARE - MED/SURG/GYN PRIV*

## 2017-06-19 PROCEDURE — 99233 SBSQ HOSP IP/OBS HIGH 50: CPT | Performed by: HOSPITALIST

## 2017-06-19 PROCEDURE — 700111 HCHG RX REV CODE 636 W/ 250 OVERRIDE (IP)

## 2017-06-19 PROCEDURE — 80307 DRUG TEST PRSMV CHEM ANLYZR: CPT

## 2017-06-19 RX ORDER — SODIUM CHLORIDE 9 MG/ML
30 INJECTION, SOLUTION INTRAVENOUS
Status: DISCONTINUED | OUTPATIENT
Start: 2017-06-19 | End: 2017-06-23 | Stop reason: HOSPADM

## 2017-06-19 RX ORDER — LORAZEPAM 1 MG/1
3 TABLET ORAL
Status: DISCONTINUED | OUTPATIENT
Start: 2017-06-19 | End: 2017-06-20

## 2017-06-19 RX ORDER — THIAMINE MONONITRATE (VIT B1) 100 MG
100 TABLET ORAL DAILY
Status: COMPLETED | OUTPATIENT
Start: 2017-06-19 | End: 2017-06-23

## 2017-06-19 RX ORDER — CEPHALEXIN 250 MG/1
250 CAPSULE ORAL EVERY 6 HOURS
Status: DISCONTINUED | OUTPATIENT
Start: 2017-06-19 | End: 2017-06-23 | Stop reason: HOSPADM

## 2017-06-19 RX ORDER — LORAZEPAM 1 MG/1
4 TABLET ORAL
Status: DISCONTINUED | OUTPATIENT
Start: 2017-06-19 | End: 2017-06-20

## 2017-06-19 RX ORDER — LORAZEPAM 1 MG/1
0.5 TABLET ORAL EVERY 4 HOURS PRN
Status: DISCONTINUED | OUTPATIENT
Start: 2017-06-19 | End: 2017-06-20

## 2017-06-19 RX ORDER — LORAZEPAM 1 MG/1
1 TABLET ORAL EVERY 4 HOURS PRN
Status: DISCONTINUED | OUTPATIENT
Start: 2017-06-19 | End: 2017-06-20

## 2017-06-19 RX ORDER — LORAZEPAM 1 MG/1
2 TABLET ORAL
Status: DISCONTINUED | OUTPATIENT
Start: 2017-06-19 | End: 2017-06-20

## 2017-06-19 RX ORDER — MEPERIDINE HYDROCHLORIDE 25 MG/ML
INJECTION INTRAMUSCULAR; INTRAVENOUS; SUBCUTANEOUS
Status: COMPLETED
Start: 2017-06-19 | End: 2017-06-19

## 2017-06-19 RX ORDER — LORAZEPAM 2 MG/ML
1.5 INJECTION INTRAMUSCULAR
Status: DISCONTINUED | OUTPATIENT
Start: 2017-06-19 | End: 2017-06-20

## 2017-06-19 RX ORDER — FOLIC ACID 1 MG/1
1 TABLET ORAL DAILY
Status: COMPLETED | OUTPATIENT
Start: 2017-06-19 | End: 2017-06-23

## 2017-06-19 RX ORDER — HALOPERIDOL 5 MG/ML
1-2 INJECTION INTRAMUSCULAR EVERY 4 HOURS PRN
Status: DISCONTINUED | OUTPATIENT
Start: 2017-06-19 | End: 2017-06-23 | Stop reason: HOSPADM

## 2017-06-19 RX ORDER — LORAZEPAM 2 MG/ML
0.5 INJECTION INTRAMUSCULAR EVERY 4 HOURS PRN
Status: DISCONTINUED | OUTPATIENT
Start: 2017-06-19 | End: 2017-06-20

## 2017-06-19 RX ORDER — LORAZEPAM 2 MG/ML
1 INJECTION INTRAMUSCULAR
Status: DISCONTINUED | OUTPATIENT
Start: 2017-06-19 | End: 2017-06-20

## 2017-06-19 RX ORDER — LORAZEPAM 2 MG/ML
2 INJECTION INTRAMUSCULAR
Status: DISCONTINUED | OUTPATIENT
Start: 2017-06-19 | End: 2017-06-20

## 2017-06-19 RX ORDER — SODIUM CHLORIDE 9 MG/ML
500 INJECTION, SOLUTION INTRAVENOUS
Status: DISCONTINUED | OUTPATIENT
Start: 2017-06-19 | End: 2017-06-23 | Stop reason: HOSPADM

## 2017-06-19 RX ADMIN — MIDAZOLAM 1 MG: 1 INJECTION INTRAMUSCULAR; INTRAVENOUS at 01:00

## 2017-06-19 RX ADMIN — CEPHALEXIN 250 MG: 250 CAPSULE ORAL at 11:47

## 2017-06-19 RX ADMIN — Medication 100 MG: at 11:47

## 2017-06-19 RX ADMIN — STANDARDIZED SENNA CONCENTRATE AND DOCUSATE SODIUM 2 TABLET: 8.6; 5 TABLET, FILM COATED ORAL at 21:59

## 2017-06-19 RX ADMIN — HEPARIN SODIUM 5000 UNITS: 5000 INJECTION, SOLUTION INTRAVENOUS; SUBCUTANEOUS at 21:59

## 2017-06-19 RX ADMIN — CEPHALEXIN 250 MG: 250 CAPSULE ORAL at 18:05

## 2017-06-19 RX ADMIN — CEPHALEXIN 250 MG: 250 CAPSULE ORAL at 23:47

## 2017-06-19 RX ADMIN — MEPERIDINE HYDROCHLORIDE 12.5 MG: 25 INJECTION INTRAMUSCULAR; INTRAVENOUS; SUBCUTANEOUS at 00:30

## 2017-06-19 RX ADMIN — SODIUM CHLORIDE: 9 INJECTION, SOLUTION INTRAVENOUS at 13:05

## 2017-06-19 RX ADMIN — OXYCODONE HYDROCHLORIDE 5 MG: 5 TABLET ORAL at 14:48

## 2017-06-19 RX ADMIN — STANDARDIZED SENNA CONCENTRATE AND DOCUSATE SODIUM 2 TABLET: 8.6; 5 TABLET, FILM COATED ORAL at 09:09

## 2017-06-19 RX ADMIN — THERA TABS 1 TABLET: TAB at 11:47

## 2017-06-19 RX ADMIN — OXYCODONE HYDROCHLORIDE 5 MG: 5 TABLET ORAL at 23:45

## 2017-06-19 RX ADMIN — SODIUM CHLORIDE: 9 INJECTION, SOLUTION INTRAVENOUS at 02:55

## 2017-06-19 RX ADMIN — HEPARIN SODIUM 5000 UNITS: 5000 INJECTION, SOLUTION INTRAVENOUS; SUBCUTANEOUS at 06:10

## 2017-06-19 RX ADMIN — OXYCODONE HYDROCHLORIDE 5 MG: 5 TABLET ORAL at 19:21

## 2017-06-19 RX ADMIN — FOLIC ACID 1 MG: 1 TABLET ORAL at 11:47

## 2017-06-19 RX ADMIN — HALOPERIDOL LACTATE 1 MG: 5 INJECTION, SOLUTION INTRAMUSCULAR at 00:45

## 2017-06-19 RX ADMIN — MORPHINE SULFATE 2 MG: 4 INJECTION INTRAVENOUS at 03:48

## 2017-06-19 RX ADMIN — CEPHALEXIN 250 MG: 250 CAPSULE ORAL at 09:09

## 2017-06-19 RX ADMIN — HEPARIN SODIUM 5000 UNITS: 5000 INJECTION, SOLUTION INTRAVENOUS; SUBCUTANEOUS at 14:47

## 2017-06-19 RX ADMIN — MEPERIDINE HYDROCHLORIDE 12.5 MG: 25 INJECTION INTRAMUSCULAR; INTRAVENOUS; SUBCUTANEOUS at 00:35

## 2017-06-19 RX ADMIN — OXYCODONE HYDROCHLORIDE 5 MG: 5 TABLET ORAL at 10:27

## 2017-06-19 RX ADMIN — CLOSTRIDIUM TETANI TOXOID ANTIGEN (FORMALDEHYDE INACTIVATED), CORYNEBACTERIUM DIPHTHERIAE TOXOID ANTIGEN (FORMALDEHYDE INACTIVATED), BORDETELLA PERTUSSIS TOXOID ANTIGEN (GLUTARALDEHYDE INACTIVATED), BORDETELLA PERTUSSIS FILAMENTOUS HEMAGGLUTININ ANTIGEN (FORMALDEHYDE INACTIVATED), BORDETELLA PERTUSSIS PERTACTIN ANTIGEN, AND BORDETELLA PERTUSSIS FIMBRIAE 2/3 ANTIGEN 0.5 ML: 5; 2; 2.5; 5; 3; 5 INJECTION, SUSPENSION INTRAMUSCULAR at 14:45

## 2017-06-19 ASSESSMENT — LIFESTYLE VARIABLES
TOTAL SCORE: 6
EVER HAD A DRINK FIRST THING IN THE MORNING TO STEADY YOUR NERVES TO GET RID OF A HANGOVER: YES
HOW MANY TIMES IN THE PAST YEAR HAVE YOU HAD 5 OR MORE DRINKS IN A DAY: 200
DOES PATIENT WANT TO STOP DRINKING: YES
EVER FELT BAD OR GUILTY ABOUT YOUR DRINKING: YES
TOTAL SCORE: 6
AUDITORY DISTURBANCES: NOT PRESENT
NAUSEA AND VOMITING: NO NAUSEA AND NO VOMITING
AUDITORY DISTURBANCES: NOT PRESENT
ORIENTATION AND CLOUDING OF SENSORIUM: ORIENTED AND CAN DO SERIAL ADDITIONS
NAUSEA AND VOMITING: *
NAUSEA AND VOMITING: NO NAUSEA AND NO VOMITING
TOTAL SCORE: 6
AGITATION: *
EVER_SMOKED: YES
TOTAL SCORE: 4
TOTAL SCORE: 5
VISUAL DISTURBANCES: NOT PRESENT
AGITATION: *
PAROXYSMAL SWEATS: NO SWEAT VISIBLE
TREMOR: TREMOR NOT VISIBLE BUT CAN BE FELT, FINGERTIP TO FINGERTIP
TREMOR: TREMOR NOT VISIBLE BUT CAN BE FELT, FINGERTIP TO FINGERTIP
ALCOHOL_USE: YES
CONSUMPTION TOTAL: POSITIVE
TACTILE DISTURBANCES: VERY MILD ITCHING, PINS AND NEEDLES SENSATION, BURNING OR NUMBNESS
AGITATION: SOMEWHAT MORE THAN NORMAL ACTIVITY
TREMOR: TREMOR NOT VISIBLE BUT CAN BE FELT, FINGERTIP TO FINGERTIP
ANXIETY: *
ANXIETY: *
AGITATION: SOMEWHAT MORE THAN NORMAL ACTIVITY
TACTILE DISTURBANCES: VERY MILD ITCHING, PINS AND NEEDLES SENSATION, BURNING OR NUMBNESS
ANXIETY: *
TOTAL SCORE: 6
VISUAL DISTURBANCES: NOT PRESENT
AUDITORY DISTURBANCES: NOT PRESENT
ORIENTATION AND CLOUDING OF SENSORIUM: ORIENTED AND CAN DO SERIAL ADDITIONS
TOTAL SCORE: 4
ORIENTATION AND CLOUDING OF SENSORIUM: ORIENTED AND CAN DO SERIAL ADDITIONS
TREMOR: TREMOR NOT VISIBLE BUT CAN BE FELT, FINGERTIP TO FINGERTIP
ON A TYPICAL DAY WHEN YOU DRINK ALCOHOL HOW MANY DRINKS DO YOU HAVE: 3
TACTILE DISTURBANCES: VERY MILD ITCHING, PINS AND NEEDLES SENSATION, BURNING OR NUMBNESS
PAROXYSMAL SWEATS: NO SWEAT VISIBLE
TREMOR: NO TREMOR
AUDITORY DISTURBANCES: NOT PRESENT
TOTAL SCORE: 4
VISUAL DISTURBANCES: NOT PRESENT
TACTILE DISTURBANCES: MILD ITCHING, PINS AND NEEDLES SENSATION, BURNING OR NUMBNESS
ANXIETY: *
VISUAL DISTURBANCES: NOT PRESENT
NAUSEA AND VOMITING: NO NAUSEA AND NO VOMITING
AGITATION: *
PAROXYSMAL SWEATS: NO SWEAT VISIBLE
TACTILE DISTURBANCES: VERY MILD ITCHING, PINS AND NEEDLES SENSATION, BURNING OR NUMBNESS
AVERAGE NUMBER OF DAYS PER WEEK YOU HAVE A DRINK CONTAINING ALCOHOL: 4
HAVE YOU EVER FELT YOU SHOULD CUT DOWN ON YOUR DRINKING: YES
NAUSEA AND VOMITING: NO NAUSEA AND NO VOMITING
HEADACHE, FULLNESS IN HEAD: NOT PRESENT
HEADACHE, FULLNESS IN HEAD: NOT PRESENT
ORIENTATION AND CLOUDING OF SENSORIUM: ORIENTED AND CAN DO SERIAL ADDITIONS
DOES PATIENT WANT TO TALK TO SOMEONE ABOUT QUITTING: NO
VISUAL DISTURBANCES: NOT PRESENT
PAROXYSMAL SWEATS: NO SWEAT VISIBLE
AUDITORY DISTURBANCES: NOT PRESENT
PAROXYSMAL SWEATS: NO SWEAT VISIBLE
HEADACHE, FULLNESS IN HEAD: NOT PRESENT
TREMOR: TREMOR NOT VISIBLE BUT CAN BE FELT, FINGERTIP TO FINGERTIP
HEADACHE, FULLNESS IN HEAD: NOT PRESENT
PACK_YEARS: 8
HAVE PEOPLE ANNOYED YOU BY CRITICIZING YOUR DRINKING: YES
HEADACHE, FULLNESS IN HEAD: NOT PRESENT
ANXIETY: *
NAUSEA AND VOMITING: NO NAUSEA AND NO VOMITING
ORIENTATION AND CLOUDING OF SENSORIUM: ORIENTED AND CAN DO SERIAL ADDITIONS

## 2017-06-19 ASSESSMENT — ENCOUNTER SYMPTOMS
ABDOMINAL PAIN: 0
VOMITING: 0
HEMOPTYSIS: 0
COUGH: 0
NAUSEA: 0

## 2017-06-19 ASSESSMENT — PAIN SCALES - GENERAL
PAINLEVEL_OUTOF10: 5
PAINLEVEL_OUTOF10: 0
PAINLEVEL_OUTOF10: 7
PAINLEVEL_OUTOF10: 2
PAINLEVEL_OUTOF10: 5
PAINLEVEL_OUTOF10: 2
PAINLEVEL_OUTOF10: 2
PAINLEVEL_OUTOF10: 5
PAINLEVEL_OUTOF10: 0
PAINLEVEL_OUTOF10: 0
PAINLEVEL_OUTOF10: 6
PAINLEVEL_OUTOF10: 2
PAINLEVEL_OUTOF10: 4
PAINLEVEL_OUTOF10: 0
PAINLEVEL_OUTOF10: 2
PAINLEVEL_OUTOF10: 7
PAINLEVEL_OUTOF10: 4
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
## 2017-06-19 NOTE — DIETARY
Nutrition Services    Pt seen per poor po consult + Low BMI <19 (18.1)     Pt is a 63 yo female with adm dx: Hand laceration, SIRS (systemic inflammatory response syndrome), Acute encephalopathy, Breast cancer (CMS-HCC), ARF (acute renal failure), Increased anion gap metabolic acidosis  Past Medical History   Diagnosis Date   • ETOH abuse    • Teeth decayed      Spoke w/pt at bedside, she appears thin but well nourished. She states she has a good appetite and denies having any recent changes in her po intake. She does states she has issues chewing d/t having poor dentition.  Per SLP eval, pt is cleared for a Dysphagia 2 diet with thin liquids; pt denies having any issues with her current diet modifications. Discussed snacks, pt agreed and preferences obtained.     Current diet: Regular, Dysphagia 2 diet with thin liquids - per ADLs, consumed 50-75% of breakfast this morning  Wt: (6/19) 50.9 kg, bed scale - pt denies having any recent wt changes, states her usual wt is around 50.9 kg (112 lbs)   BMI: 18.1 - underweight   Pertinent labs: Gluc 157, BUN 48, creat 1.62  Pertinent meds: Folic Acid, Heparin, MVI, Thiamine, Zofran (prn)   Fluids: IVF NS at 100 mL/hr cont  Skin: pt noted with surgical incision to right arm   GI: per ADLs, last BM 6/19 - pt denies having any issues chewing or swallowing     Recommendations:  · Encourage po intake of meals and snacks   · Record percentage of meals conusmed in ADLs to help monitor po adequacy  · Nutrition Rep to see pt daily for meal preferences   · Monitor wt and lab trends     RD following

## 2017-06-19 NOTE — OR NURSING
Pt incoherent unable to follow commands aggitated. Concerned regarding ortho floor ability to provide care. Dr. Grey the adm. hospitalist paged. Order to admit to icu received.

## 2017-06-19 NOTE — THERAPY
"Speech Language Therapy Clinical Swallow Evaluation completed.    Functional Status:  Pt was AAOx4, eager to participate, however had poor attention to task.  No gross deficits were appreciated in oral exam, and vocal quality was strong and clear.  Pt noted to have tongue clicking and chewing-like oral movements independent of PO intake.  She consumed PO trials of ice chips, nectars, puree, soft solids and thins without any overt s/sx of aspiration.  Pt reporting some pain with dry solids prior to hospitalization due to poor dentition and recent dental abscess, thus no dry solids were tested.  She appears to be at the level to start a dysphagia II diet with thins, with supervision.  Please discontinue PO intake with lethargy, as RN reports waxing and waning NIGHAT.      Recommendations - Diet: Dysphagia II, Thin Liquid                          Strategies: Monitor during meals, Assistance needed for meal tray set-up and Head of Bed at 90 Degrees                          Medication Administration: Whole with Liquid Wash    Plan of Care: Will benefit from Speech Therapy 3 times per week    Post-Acute Therapy: Currently anticipate no further skilled therapy needs for dysphagia once patient is discharged from the inpatient setting.    See \"Rehab Therapy-Acute\" Patient Summary Report for complete documentation. Thank you for the consult.  "

## 2017-06-19 NOTE — CARE PLAN
Problem: Pain Management  Goal: Pain level will decrease to patient’s comfort goal  Outcome: PROGRESSING AS EXPECTED  Patients pain level assessed using 0 to 10 scale. Medicated per MAR.    Problem: Skin Integrity  Goal: Risk for impaired skin integrity will decrease  Patient's skin assessed. Documented under wounds. Wound consult done. Q2 hour turns initiated. Mositurizer applied when necessary.
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
## 2017-06-19 NOTE — CARE PLAN
Problem: Communication  Goal: The ability to communicate needs accurately and effectively will improve  Outcome: PROGRESSING AS EXPECTED  Discussed POC with patient , patient agrees to current POC.     Problem: Safety  Goal: Will remain free from injury  Outcome: PROGRESSING AS EXPECTED  Fall risk assessed and precautions implemented. Patient educated and verbalized understanding. Patient is calling appropriately and remains free from injury. Call light is within reach.    Problem: Pain Management  Goal: Pain level will decrease to patient’s comfort goal  Outcome: PROGRESSING AS EXPECTED  Patient received Morphine per MAR for pain.

## 2017-06-19 NOTE — OR NURSING
F/C RX'D, LABS COLLECTED. POC TO TRANSFER TO R 116 REPORT TO LETICIA BRADSHAW. PT HAS RX FOR CT. ICU STAFF WILL GET PT FROM PACU AND TRANSPORT TO CT.    DR. BARBOZA AND DR. SWANSON AWARE OF POC.
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
## 2017-06-19 NOTE — WOUND TEAM
"[facility]  Inpatient Services  Initial Wound & Skin Care Evaluation    Admission Date:  6/18/2017   HPI, PMH, SH: Reviewed  Unit where seen by Wound Team: [id number]    WOUND CONSULT RELATED TO: L breast    SUBJECTIVE:  \"I'm doing better.\"     Self Report / Pain Level: no c/o pain      OBJECTIVE: on DEBORAH bed, mepilex in place, pt helped turn self when asked  WOUND TYPE, LOCATION, CHARACTERISTICS (Pressure ulcers: location, stage, POA or date identified)  Pressure Ulcer  Deep Tissue Injury POA Coccyx;Sacrum Midline  Periwound Skin: Discolored  Drainage : None     Tissue Type and %:    100% red/purple  Wound Edges:    attached    Odor:     None   Exposed structure(s):   No   Signs and Symptoms of Infection: none    Measurements: taken 6/19/17  Length (cm): 7  Width (cm): 5  Depth (cm):  (nm intact)     Tracts/undermining:  None     INTERVENTIONS BY WOUND TEAM: picture taken of L chest. Pt turned to L side and mepilex peeled back. Viewed and palpated skin. Reapplied mepilex. L chest LUIZ.   Dressing Options: Mepilex    Interdisciplinary consultation:   With Nursing;With Patient     EVALUATION: pt has diagnosed breast cancer. There is discoloration and crust present 3 x 7 cm, pt reports that \"it bleeds every once in a while.\" Sacrococcygeal area discolored and non-blanching.     Factors affecting wound healing: Hx breast cancer with no Tx, history of methamphetamine, tobacco and alcohol abuse  Goals: maintain intact skin until DTI reveals      NURSING PLAN OF CARE ORDERS (X):    Dressing changes: See Dressing Maintenance orders:   Skin care: See Skin Care orders: x  Rectal tube care: See Rectal Tube Care orders:   Other orders:    RSKIN: CURRENT (X) ORDERED (O)  Q shift Shine:  X  Q shift pressure point assessments:  X  Pressure redistribution mattress        DEBORAH    x  Bariatric DEBORAH      Bariatric foam        Heel float boots       Heels floated on pillows      Barrier wipes      Barrier Cream      Barrier " paste      Sacral silicone dressing    x  Padded O2 tubing      Anchorfast      Trach with Optifoam split foam       Waffle cushion      Rectal tube or BMS      Antifungal tx    Turn q 2 hours x  Up to chair  Ambulate   PT/OT     Dietician      PO     TF   TPN     PVN    NPO  1 # days   Other       WOUND TEAM PLAN OF CARE (X):   NPWT change 3 x week:        Dressing changes by wound team:       Follow up as needed:    x   Other (explain):    Anticipated discharge plans (X):  SNF:           Home Care:           Outpatient Wound Center:            Self Care:            Other:  tbd

## 2017-06-19 NOTE — OR NURSING
Patient admits to hx of half a gallon of alcohol a day previously in her life, states she cut back over a year ago, states she drinks only a little a day now if any, smokes a couple cigars a day routinely.  Patient very manic upon arrival to pre-op area, poor historian, thinks she is taking amoxicillin for dental carries, prior to having a work-up for dentures. Patient very jittery, anxious, startles easily, noise reduced and lights dimmed in pre-op area until patient goes back to OR. Patient care transferred to LETICIA Saucedo

## 2017-06-19 NOTE — OR SURGEON
Immediate Post-Operative Note      PreOp Diagnosis:severe deep laceration right forearm, Spaghetti forearm, ulnar nerve and artery lacerations    PostOp Diagnosis: same    Procedure(s):  REPAIR OF MULTIPLE FLEXOR TENDONS LACERATIONS; REPAIR OF ULNAR NERVE LACERATION - Wound Class: Contaminated  IRRIGATION & DEBRIDEMENT GENERAL - Wound Class: Contaminated  WOUND CLOSURE GENERAL - Wound Class: Contaminated    Surgeon(s):  Mert Madsen M.D.    Anesthesiologist/Type of Anesthesia:  Anesthesiologist: Yung Taylor M.D./General    Surgical Staff:  Circulator: Lewis Crump RYefriNYefri; Jose Blue RYferiNYefri; Isac Fry RPANFILO  Limb Pro: [place]; Leopold von Garcia  Scrub Person: Cleo Cervantes    Specimen:     Estimated Blood Loss: 50cc, T.T. 2hrs    Findings: laceration of all FDS and FDP tendons of forearm and laceration of ulnar art. and nerve    Complications: none        6/18/2017 11:50 PM Mert Madsen
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
## 2017-06-19 NOTE — PROGRESS NOTES
0200- Patient picked up in PACU, report received from Delfino KELLY. Patient laying in bed, mumbling, and laughing.     0215- Patient transported to CT with this ACLS RN and CCT.     0235- Patient transported to room. Head to toe assessment complete. 2 RN skin assessment complete. Patient C/O hip pain while moving. Full Bath given. CHG bath given.

## 2017-06-20 ENCOUNTER — APPOINTMENT (OUTPATIENT)
Dept: RADIOLOGY | Facility: MEDICAL CENTER | Age: 62
DRG: 853 | End: 2017-06-20
Attending: INTERNAL MEDICINE
Payer: MEDICAID

## 2017-06-20 PROBLEM — A41.9 SEVERE SEPSIS(995.92): Status: ACTIVE | Noted: 2017-06-20

## 2017-06-20 PROBLEM — A41.9 SEPSIS, UNSPECIFIED: Status: RESOLVED | Noted: 2017-06-19 | Resolved: 2017-06-20

## 2017-06-20 PROBLEM — M16.11 OSTEOARTHRITIS OF RIGHT HIP: Status: ACTIVE | Noted: 2017-06-20

## 2017-06-20 PROBLEM — R65.20 SEVERE SEPSIS(995.92): Status: ACTIVE | Noted: 2017-06-20

## 2017-06-20 LAB
EKG IMPRESSION: NORMAL
MAGNESIUM SERPL-MCNC: 1.7 MG/DL (ref 1.5–2.5)
PHOSPHATE SERPL-MCNC: 1.4 MG/DL (ref 2.5–4.5)

## 2017-06-20 PROCEDURE — A9270 NON-COVERED ITEM OR SERVICE: HCPCS | Performed by: HOSPITALIST

## 2017-06-20 PROCEDURE — G8988 SELF CARE GOAL STATUS: HCPCS | Mod: CI

## 2017-06-20 PROCEDURE — 700111 HCHG RX REV CODE 636 W/ 250 OVERRIDE (IP): Performed by: HOSPITALIST

## 2017-06-20 PROCEDURE — 770001 HCHG ROOM/CARE - MED/SURG/GYN PRIV*

## 2017-06-20 PROCEDURE — G8987 SELF CARE CURRENT STATUS: HCPCS | Mod: CI

## 2017-06-20 PROCEDURE — 92526 ORAL FUNCTION THERAPY: CPT

## 2017-06-20 PROCEDURE — G8997 SWALLOW GOAL STATUS: HCPCS | Mod: CI

## 2017-06-20 PROCEDURE — 700102 HCHG RX REV CODE 250 W/ 637 OVERRIDE(OP): Performed by: HOSPITALIST

## 2017-06-20 PROCEDURE — 700105 HCHG RX REV CODE 258: Performed by: HOSPITALIST

## 2017-06-20 PROCEDURE — 99232 SBSQ HOSP IP/OBS MODERATE 35: CPT | Performed by: HOSPITALIST

## 2017-06-20 PROCEDURE — 93005 ELECTROCARDIOGRAM TRACING: CPT | Performed by: HOSPITALIST

## 2017-06-20 PROCEDURE — 93010 ELECTROCARDIOGRAM REPORT: CPT | Performed by: INTERNAL MEDICINE

## 2017-06-20 PROCEDURE — 72192 CT PELVIS W/O DYE: CPT

## 2017-06-20 PROCEDURE — 97165 OT EVAL LOW COMPLEX 30 MIN: CPT

## 2017-06-20 PROCEDURE — 84100 ASSAY OF PHOSPHORUS: CPT

## 2017-06-20 PROCEDURE — G8989 SELF CARE D/C STATUS: HCPCS | Mod: CI

## 2017-06-20 PROCEDURE — 83735 ASSAY OF MAGNESIUM: CPT

## 2017-06-20 PROCEDURE — G8998 SWALLOW D/C STATUS: HCPCS | Mod: CI

## 2017-06-20 RX ORDER — HEPARIN SODIUM 5000 [USP'U]/ML
5000 INJECTION, SOLUTION INTRAVENOUS; SUBCUTANEOUS EVERY 8 HOURS
Status: DISCONTINUED | OUTPATIENT
Start: 2017-06-20 | End: 2017-06-23 | Stop reason: HOSPADM

## 2017-06-20 RX ADMIN — OXYCODONE HYDROCHLORIDE 5 MG: 5 TABLET ORAL at 12:19

## 2017-06-20 RX ADMIN — THERA TABS 1 TABLET: TAB at 08:18

## 2017-06-20 RX ADMIN — OXYCODONE HYDROCHLORIDE 5 MG: 5 TABLET ORAL at 22:00

## 2017-06-20 RX ADMIN — HEPARIN SODIUM 5000 UNITS: 5000 INJECTION, SOLUTION INTRAVENOUS; SUBCUTANEOUS at 13:58

## 2017-06-20 RX ADMIN — STANDARDIZED SENNA CONCENTRATE AND DOCUSATE SODIUM 2 TABLET: 8.6; 5 TABLET, FILM COATED ORAL at 08:18

## 2017-06-20 RX ADMIN — HEPARIN SODIUM 5000 UNITS: 5000 INJECTION, SOLUTION INTRAVENOUS; SUBCUTANEOUS at 21:59

## 2017-06-20 RX ADMIN — CEPHALEXIN 250 MG: 250 CAPSULE ORAL at 23:59

## 2017-06-20 RX ADMIN — CEPHALEXIN 250 MG: 250 CAPSULE ORAL at 12:19

## 2017-06-20 RX ADMIN — CEPHALEXIN 250 MG: 250 CAPSULE ORAL at 17:56

## 2017-06-20 RX ADMIN — FOLIC ACID 1 MG: 1 TABLET ORAL at 08:18

## 2017-06-20 RX ADMIN — OXYCODONE HYDROCHLORIDE 5 MG: 5 TABLET ORAL at 05:33

## 2017-06-20 RX ADMIN — OXYCODONE HYDROCHLORIDE 5 MG: 5 TABLET ORAL at 08:55

## 2017-06-20 RX ADMIN — OXYCODONE HYDROCHLORIDE 5 MG: 5 TABLET ORAL at 17:56

## 2017-06-20 RX ADMIN — Medication 100 MG: at 08:18

## 2017-06-20 RX ADMIN — STANDARDIZED SENNA CONCENTRATE AND DOCUSATE SODIUM 2 TABLET: 8.6; 5 TABLET, FILM COATED ORAL at 22:00

## 2017-06-20 RX ADMIN — SODIUM CHLORIDE: 9 INJECTION, SOLUTION INTRAVENOUS at 12:19

## 2017-06-20 RX ADMIN — CEPHALEXIN 250 MG: 250 CAPSULE ORAL at 05:33

## 2017-06-20 RX ADMIN — HEPARIN SODIUM 5000 UNITS: 5000 INJECTION, SOLUTION INTRAVENOUS; SUBCUTANEOUS at 05:33

## 2017-06-20 ASSESSMENT — LIFESTYLE VARIABLES
HEADACHE, FULLNESS IN HEAD: NOT PRESENT
TREMOR: NO TREMOR
AGITATION: SOMEWHAT MORE THAN NORMAL ACTIVITY
AUDITORY DISTURBANCES: NOT PRESENT
TOTAL SCORE: 2
PAROXYSMAL SWEATS: NO SWEAT VISIBLE
ORIENTATION AND CLOUDING OF SENSORIUM: ORIENTED AND CAN DO SERIAL ADDITIONS
AGITATION: SOMEWHAT MORE THAN NORMAL ACTIVITY
NAUSEA AND VOMITING: NO NAUSEA AND NO VOMITING
TREMOR: NO TREMOR
NAUSEA AND VOMITING: NO NAUSEA AND NO VOMITING
ORIENTATION AND CLOUDING OF SENSORIUM: ORIENTED AND CAN DO SERIAL ADDITIONS
VISUAL DISTURBANCES: NOT PRESENT
TOTAL SCORE: 2
VISUAL DISTURBANCES: NOT PRESENT
AUDITORY DISTURBANCES: NOT PRESENT
ORIENTATION AND CLOUDING OF SENSORIUM: ORIENTED AND CAN DO SERIAL ADDITIONS
NAUSEA AND VOMITING: NO NAUSEA AND NO VOMITING
ANXIETY: MILDLY ANXIOUS
HEADACHE, FULLNESS IN HEAD: NOT PRESENT
NAUSEA AND VOMITING: NO NAUSEA AND NO VOMITING
ANXIETY: MILDLY ANXIOUS
PAROXYSMAL SWEATS: NO SWEAT VISIBLE
HEADACHE, FULLNESS IN HEAD: NOT PRESENT
PAROXYSMAL SWEATS: NO SWEAT VISIBLE
AUDITORY DISTURBANCES: NOT PRESENT
TOTAL SCORE: 2
AGITATION: SOMEWHAT MORE THAN NORMAL ACTIVITY
TREMOR: NO TREMOR
PAROXYSMAL SWEATS: NO SWEAT VISIBLE
AUDITORY DISTURBANCES: NOT PRESENT
VISUAL DISTURBANCES: NOT PRESENT
AUDITORY DISTURBANCES: NOT PRESENT
AGITATION: SOMEWHAT MORE THAN NORMAL ACTIVITY
TOTAL SCORE: 2
TREMOR: NO TREMOR
TREMOR: NO TREMOR
HEADACHE, FULLNESS IN HEAD: NOT PRESENT
ANXIETY: MILDLY ANXIOUS
VISUAL DISTURBANCES: NOT PRESENT
ORIENTATION AND CLOUDING OF SENSORIUM: ORIENTED AND CAN DO SERIAL ADDITIONS
ANXIETY: MILDLY ANXIOUS
ORIENTATION AND CLOUDING OF SENSORIUM: ORIENTED AND CAN DO SERIAL ADDITIONS
NAUSEA AND VOMITING: NO NAUSEA AND NO VOMITING
AGITATION: SOMEWHAT MORE THAN NORMAL ACTIVITY
ANXIETY: MILDLY ANXIOUS
HEADACHE, FULLNESS IN HEAD: NOT PRESENT
TOTAL SCORE: 2
PAROXYSMAL SWEATS: NO SWEAT VISIBLE
VISUAL DISTURBANCES: NOT PRESENT

## 2017-06-20 ASSESSMENT — PAIN SCALES - GENERAL
PAINLEVEL_OUTOF10: 2
PAINLEVEL_OUTOF10: 7
PAINLEVEL_OUTOF10: 7
PAINLEVEL_OUTOF10: 9
PAINLEVEL_OUTOF10: 3
PAINLEVEL_OUTOF10: 3
PAINLEVEL_OUTOF10: 10
PAINLEVEL_OUTOF10: 3
PAINLEVEL_OUTOF10: 7

## 2017-06-20 ASSESSMENT — ACTIVITIES OF DAILY LIVING (ADL): TOILETING: INDEPENDENT

## 2017-06-20 ASSESSMENT — ENCOUNTER SYMPTOMS
SHORTNESS OF BREATH: 0
FEVER: 0
COUGH: 0
CHILLS: 0
VOMITING: 0
NAUSEA: 0

## 2017-06-20 ASSESSMENT — COGNITIVE AND FUNCTIONAL STATUS - GENERAL
DAILY ACTIVITIY SCORE: 23
SUGGESTED CMS G CODE MODIFIER DAILY ACTIVITY: CI
HELP NEEDED FOR BATHING: A LITTLE

## 2017-06-20 NOTE — ASSESSMENT & PLAN NOTE
Consistent with delirium from sepsis with possible psych disorder and drug use thus likely multifactorial  Improved, patient is oriented to situation and cooperative  Resolved. Back to normal mentation.

## 2017-06-20 NOTE — PROGRESS NOTES
[facility] Hospitalist Progress Note    Date of Service: 2017    Chief Complaint  62 y.o. female admitted 2017 with right hand pain, significant laceration at her hand from a window, apparent infection and sepsis    Interval Problem Update  In ICU overnight, blood pressure stable  Disorganized thoughts this morning but she is cooperative  Some pain at right hand, dressing bothersome  Asks when she can go home    Consultants/Specialty  Plastic Surgery    Disposition  TBD        Review of Systems   Respiratory: Negative for cough and hemoptysis.    Gastrointestinal: Negative for nausea, vomiting and abdominal pain.   Skin: Negative for itching and rash.      Physical Exam  Laboratory/Imaging   Hemodynamics  Temp (24hrs), Av.9 °C (98.4 °F), Min:36.4 °C (97.5 °F), Max:37.2 °C (99 °F)   Temperature: 37.2 °C (99 °F)  Pulse  Av.9  Min: 66  Max: 99 Heart Rate (Monitored): 81  Blood Pressure:  (hob down. ), NIBP: 108/62 mmHg      Respiratory      Respiration: (!) 32, Pulse Oximetry: 94 %        RUL Breath Sounds: Clear, RML Breath Sounds: Clear, RLL Breath Sounds: Clear;Diminished, MAZIN Breath Sounds: Clear, LLL Breath Sounds: Clear;Diminished    Fluids    Intake/Output Summary (Last 24 hours) at 17 1912  Last data filed at 17 1800   Gross per 24 hour   Intake   4230 ml   Output   1215 ml   Net   3015 ml       Nutrition  Orders Placed This Encounter   Procedures   • DIET ORDER     Standing Status: Standing      Number of Occurrences: 1      Standing Expiration Date:      Order Specific Question:  Diet:     Answer:  Regular [1]     Order Specific Question:  Texture/Fiber modifications:     Answer:  Dysphagia 2(Pureed/Chopped)specify fluid consistency(question 6) [2]     Order Specific Question:  Consistency/Fluid modifications:     Answer:  Thin Liquids [3]     Physical Exam   Constitutional: She is oriented to person, place, and time.   Thin, disheveled    HENT:   Head: Normocephalic and atraumatic.    Eyes: Conjunctivae are normal. Right eye exhibits no discharge. Left eye exhibits no discharge. No scleral icterus.   Cardiovascular: Normal rate, regular rhythm and intact distal pulses.    No murmur heard.  Pulmonary/Chest: Effort normal and breath sounds normal. No respiratory distress. She has no wheezes.   Mass at right breast   Abdominal: Soft. Bowel sounds are normal. She exhibits no distension. There is no tenderness.   Musculoskeletal: Normal range of motion. She exhibits edema.   Neurological: She is alert and oriented to person, place, and time. No cranial nerve deficit.   Psychiatric:   Tangential and disorganized  Is cooperative       Recent Labs      06/18/17 1920 06/19/17   0056   WBC  20.1*  14.3*   RBC  4.34  3.54*   HEMOGLOBIN  13.8  11.1*   HEMATOCRIT  40.7  32.7*   MCV  93.8  92.4   MCH  31.8  31.4   MCHC  33.9  33.9   RDW  54.0*  53.4*   PLATELETCT  313  254   MPV  10.5  10.0     Recent Labs      06/18/17 1920 06/19/17   0056   SODIUM  137  136   POTASSIUM  4.6  3.8   CHLORIDE  104  106   CO2  16*  19*   GLUCOSE  121*  157*   BUN  47*  48*   CREATININE  1.91*  1.62*   CALCIUM  10.0  8.5                      Assessment/Plan     * Hand laceration (present on admission)  Assessment & Plan  Traumatic, from putting hand through a window  S/P surgical repair by Dr. Madsen    Sepsis (CMS-HCC) (present on admission)  Assessment & Plan  Source is soft tissue, hand laceration  End organ dysfunction includes acute renal failure  Blood pressure stable, did not require pressors    ARF (acute renal failure) (CMS-HCC) (present on admission)  Assessment & Plan  Related to sepsis  Improved  Avoid nephrotoxins    Acute encephalopathy (present on admission)  Assessment & Plan  Due to sepsis or psych disorder, probably multifactorial  Improved, patient is oriented to situation and cooperative    Breast cancer (CMS-HCC) (present on admission)  Assessment & Plan  Advanced disease  She has elected to not seek  care for this for some time  Would recommend outpatient follow up    Labs reviewed and Medications reviewed  Arenas catheter: No Arenas            Antibiotics: Treating active infection/contamination beyond 24 hours perioperative coverage

## 2017-06-20 NOTE — ASSESSMENT & PLAN NOTE
Traumatic, from putting hand through a window  S/P surgical repair by Dr. Madsen  Cast in place, CDI.  Dr. Madsen, Surgery wanted her to follow up in 2 weeks for suture removal and evaluation at clinic  Assign and follow up to primary care provider in 1-2 weeks for general check up

## 2017-06-20 NOTE — PROGRESS NOTES
1900- Report received from Ольга KELLY, patient resting in bed.    1915- Arenas removed, patient ambulated to EOB, sitting up for dinner. G bath complete.    2340- patient crying out in pain 2/2 pain in the right hip. Dr Nolan julien, orders received for CT of hip w/o contrast.    0200- Spoke with CT tech patient is scheduled for CT at 0900 6/20/17, transport has been arranged.

## 2017-06-20 NOTE — CARE PLAN
Problem: Communication  Goal: The ability to communicate needs accurately and effectively will improve  Outcome: PROGRESSING AS EXPECTED  Discussed POC with patient , patient agrees to current POC.     Problem: Safety  Goal: Will remain free from injury  Outcome: PROGRESSING AS EXPECTED  Fall risk assessed and precautions implemented. Patient educated and verbalized understanding.    Problem: Pain Management  Goal: Pain level will decrease to patient’s comfort goal  Outcome: PROGRESSING AS EXPECTED  Patient c/o 10/10 pain to the right hip, MD updated orders received for CT of pelvis w/o contrast

## 2017-06-20 NOTE — THERAPY
"Occupational Therapy Evaluation completed.   Functional Status:  Pt seen for OT eval due to R hand lacerations. Pt's R hand in dressing from fingers to forearm. Thumb mobile and WDL. Pt educated on edema control, importance of AROM, and general one handed techniques for ADLs.   Plan of Care: Patient with no further skilled OT needs in the acute care setting at this time  Discharge Recommendations:  Equipment: Single Point Cane. Post-acute therapy home with home health services for hand therapy.     See \"Rehab Therapy-Acute\" Patient Summary Report for complete documentation.    "

## 2017-06-20 NOTE — ASSESSMENT & PLAN NOTE
Source is soft tissue, hand laceration  Received tetanus shot.  End organ dysfunction includes acute renal failure  Blood pressure stable, did not require pressors  Cultures negative. On Keflex.  Continue Kefles, would expect total 10 to 14d course.  Discharged on Keflex for a 10 day course. Assign and follow up with primary care provider in 1-2 weeks.  Follow up with Dr. Madsen, Surgery in 2 weeks.

## 2017-06-20 NOTE — THERAPY
"Speech Language Therapy dysphagia treatment completed.     Functional Status:  Pt was AAOx4, eager to participate and reporting she does not want any upgraded textures 2/2 oral pain and poor dentition.  Pt consumed trials of soft solids and thin liquids without any overt s/sx of aspiration.  Pt verbalized good understanding of SLP recommendations and s/sx of aspiration.   As pt will not be advanced beyond a dysphagia II diet per her request, she no longer needs any SLP services in the acute care setting to address dysphagia.      Recommendations: Continue a dysphagia II diet with thin liquids.    Plan of Care: Patient with no further skilled SLP needs in the acute care setting at this time  Post-Acute Therapy: Currently anticipate no further skilled therapy needs once patient is discharged from the inpatient setting.    See \"Rehab Therapy-Acute\" Patient Summary Report for complete documentation.     "

## 2017-06-20 NOTE — ASSESSMENT & PLAN NOTE
Source is soft tissue, hand laceration  End organ dysfunction includes acute renal failure  Blood pressure stable, did not require pressors

## 2017-06-20 NOTE — CARE PLAN
Problem: Safety  Goal: Will remain free from injury  Bed controls on. Bed locked. Bed alarm on. Appropriate signs outside doors. Patient oriented to surrounding. Education provided.     Problem: Pain Management  Goal: Pain level will decrease to patient’s comfort goal  Patient's pain assessed using the 0 to 10 scale. Medicated per MAR. Non-pharmalogical coping options discussed.    Problem: Skin Integrity  Goal: Risk for impaired skin integrity will decrease  Patient's skin assessed. Mositurizer applied when necessary. Educated patient regarding the importance of Q2 hour turns.

## 2017-06-20 NOTE — ASSESSMENT & PLAN NOTE
Advanced disease  She has elected to not seek care for this   Appears metastatic  Would recommend outpatient follow up outpatient though she is not interested and understands that she may die of breast cancer. She has had the lesions since 2011 and was referred for evaluation then in [place].  Assign and follow up with primary care provider in 1-2 weeks to follow up on this

## 2017-06-20 NOTE — ASSESSMENT & PLAN NOTE
As seen on CT pelvis 6/20.  Assign and follow up to primary care provider and referral to Orthopedics clinic deferred to them

## 2017-06-21 LAB — EKG IMPRESSION: NORMAL

## 2017-06-21 PROCEDURE — 770006 HCHG ROOM/CARE - MED/SURG/GYN SEMI*

## 2017-06-21 PROCEDURE — A9270 NON-COVERED ITEM OR SERVICE: HCPCS | Performed by: HOSPITALIST

## 2017-06-21 PROCEDURE — 700102 HCHG RX REV CODE 250 W/ 637 OVERRIDE(OP): Performed by: HOSPITALIST

## 2017-06-21 PROCEDURE — 93005 ELECTROCARDIOGRAM TRACING: CPT | Performed by: HOSPITALIST

## 2017-06-21 PROCEDURE — 93010 ELECTROCARDIOGRAM REPORT: CPT | Performed by: INTERNAL MEDICINE

## 2017-06-21 PROCEDURE — 700111 HCHG RX REV CODE 636 W/ 250 OVERRIDE (IP): Performed by: HOSPITALIST

## 2017-06-21 RX ADMIN — STANDARDIZED SENNA CONCENTRATE AND DOCUSATE SODIUM 2 TABLET: 8.6; 5 TABLET, FILM COATED ORAL at 21:44

## 2017-06-21 RX ADMIN — OXYCODONE HYDROCHLORIDE 5 MG: 5 TABLET ORAL at 17:51

## 2017-06-21 RX ADMIN — HEPARIN SODIUM 5000 UNITS: 5000 INJECTION, SOLUTION INTRAVENOUS; SUBCUTANEOUS at 13:37

## 2017-06-21 RX ADMIN — CEPHALEXIN 250 MG: 250 CAPSULE ORAL at 12:19

## 2017-06-21 RX ADMIN — CEPHALEXIN 250 MG: 250 CAPSULE ORAL at 05:47

## 2017-06-21 RX ADMIN — Medication 100 MG: at 10:00

## 2017-06-21 RX ADMIN — CEPHALEXIN 250 MG: 250 CAPSULE ORAL at 17:50

## 2017-06-21 RX ADMIN — OXYCODONE HYDROCHLORIDE 5 MG: 5 TABLET ORAL at 02:54

## 2017-06-21 RX ADMIN — OXYCODONE HYDROCHLORIDE 5 MG: 5 TABLET ORAL at 10:06

## 2017-06-21 RX ADMIN — OXYCODONE HYDROCHLORIDE 5 MG: 5 TABLET ORAL at 21:44

## 2017-06-21 RX ADMIN — HEPARIN SODIUM 5000 UNITS: 5000 INJECTION, SOLUTION INTRAVENOUS; SUBCUTANEOUS at 05:47

## 2017-06-21 RX ADMIN — HEPARIN SODIUM 5000 UNITS: 5000 INJECTION, SOLUTION INTRAVENOUS; SUBCUTANEOUS at 21:45

## 2017-06-21 RX ADMIN — THERA TABS 1 TABLET: TAB at 10:00

## 2017-06-21 RX ADMIN — FOLIC ACID 1 MG: 1 TABLET ORAL at 10:00

## 2017-06-21 RX ADMIN — STANDARDIZED SENNA CONCENTRATE AND DOCUSATE SODIUM 2 TABLET: 8.6; 5 TABLET, FILM COATED ORAL at 10:00

## 2017-06-21 RX ADMIN — OXYCODONE HYDROCHLORIDE 5 MG: 5 TABLET ORAL at 13:37

## 2017-06-21 ASSESSMENT — PAIN SCALES - GENERAL
PAINLEVEL_OUTOF10: 2
PAINLEVEL_OUTOF10: 7
PAINLEVEL_OUTOF10: 6
PAINLEVEL_OUTOF10: 6

## 2017-06-21 NOTE — PROGRESS NOTES
[facility] Hospitalist Progress Note    Date of Service: 2017    Chief Complaint  62 y.o. female admitted 2017 with right hand laceration    Interval Problem Update  Ms. Baptiste has a hx of breast cancer and meth use that required ICU admission for sepsis after right hand laceration. We had a long discussion about her breast cancer and she is quite adamant that she does not want any treatment and is aware that she may die of this disease which still may be treatable and perhaps even curable.     Consultants/Specialty  Madsen plastics      Disposition   for discharge planning assistance        Review of Systems   Constitutional: Negative for fever and chills.   Respiratory: Negative for cough and shortness of breath.    Cardiovascular: Negative for chest pain and leg swelling.   Gastrointestinal: Negative for nausea and vomiting.   Musculoskeletal:        Pain in the right hand      Physical Exam  Laboratory/Imaging   Hemodynamics  Temp (24hrs), Av.9 °C (98.4 °F), Min:36.6 °C (97.9 °F), Max:37.3 °C (99.1 °F)   Temperature: 36.9 °C (98.4 °F)  Pulse  Av.8  Min: 66  Max: 101    NIBP: 124/69 mmHg      Respiratory      Respiration: (!) 22, Pulse Oximetry: 96 %        RUL Breath Sounds: Clear, RML Breath Sounds: Clear, RLL Breath Sounds: Clear, MAZIN Breath Sounds: Clear, LLL Breath Sounds: Clear;Diminished    Fluids    Intake/Output Summary (Last 24 hours) at 17 1933  Last data filed at 17 1800   Gross per 24 hour   Intake   3200 ml   Output   2740 ml   Net    460 ml       Nutrition  Orders Placed This Encounter   Procedures   • DIET ORDER     Standing Status: Standing      Number of Occurrences: 1      Standing Expiration Date:      Order Specific Question:  Diet:     Answer:  Regular [1]     Order Specific Question:  Texture/Fiber modifications:     Answer:  Dysphagia 2(Pureed/Chopped)specify fluid consistency(question 6) [2]     Order Specific Question:  Consistency/Fluid  modifications:     Answer:  Thin Liquids [3]     Physical Exam   Constitutional: She is oriented to person, place, and time.   Neck: Neck supple.   Cardiovascular: Normal rate and regular rhythm.    No murmur heard.  Pulmonary/Chest:   Right breast hard lesion with scab that has encompassed the nipple.    Abdominal: She exhibits distension. There is no tenderness.   Musculoskeletal:   Right hand is covered.   Poor muscle tone of the legs.    Neurological: She is alert and oriented to person, place, and time.   Psychiatric: She has a normal mood and affect. Her behavior is normal.       Recent Labs      06/18/17 1920 06/19/17   0056   WBC  20.1*  14.3*   RBC  4.34  3.54*   HEMOGLOBIN  13.8  11.1*   HEMATOCRIT  40.7  32.7*   MCV  93.8  92.4   MCH  31.8  31.4   MCHC  33.9  33.9   RDW  54.0*  53.4*   PLATELETCT  313  254   MPV  10.5  10.0     Recent Labs      06/18/17 1920 06/19/17   0056   SODIUM  137  136   POTASSIUM  4.6  3.8   CHLORIDE  104  106   CO2  16*  19*   GLUCOSE  121*  157*   BUN  47*  48*   CREATININE  1.91*  1.62*   CALCIUM  10.0  8.5                      Assessment/Plan     * Hand laceration (present on admission)  Assessment & Plan  Traumatic, from putting hand through a window  S/P surgical repair by Dr. Madsen    Severe sepsis (CMS-HCC) (present on admission)  Assessment & Plan  Source is soft tissue, hand laceration  End organ dysfunction includes acute renal failure  Blood pressure stable, did not require pressors  Cultures negative. On Keflex.    ARF (acute renal failure) (CMS-HCC) (present on admission)  Assessment & Plan  Related to sepsis  Improved  Avoid nephrotoxins    Acute encephalopathy (present on admission)  Assessment & Plan  Consistent with delirium from sepsis with possible psych disorder and drug use thus likely multifactorial  Improved, patient is oriented to situation and cooperative    Osteoarthritis of right hip (present on admission)  Assessment & Plan  As seen on CT pelvis  6/20.    Breast cancer (CMS-HCC) (present on admission)  Assessment & Plan  Advanced disease  She has elected to not seek care for this   Appears metastatic  Would recommend outpatient follow up outpatient though she is not interested and understands that she may die of breast cancer. She has had the lesions since 2011 and was referred for evaluation then in [place].    Increased anion gap metabolic acidosis (present on admission)  Assessment & Plan  Resolved with fluids      Labs reviewed and Medications reviewed  Arenas catheter: No Arenas      DVT Prophylaxis: Enoxaparin (Lovenox)
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
## 2017-06-21 NOTE — PROGRESS NOTES
Late entry EPIC downtime:    0230- Report given to Za RN, patient transferred to room T432-1 with this RN. Patient leaves my care A/Ox4, no acute c/o at this time.

## 2017-06-21 NOTE — CARE PLAN
Problem: Communication  Goal: The ability to communicate needs accurately and effectively will improve  Outcome: PROGRESSING AS EXPECTED  Discussed POC with patient , patient agrees to current POC.     Problem: Safety  Goal: Will remain free from injury  Outcome: PROGRESSING AS EXPECTED  Fall risk assessed and precautions implemented. Patient educated and verbalized understanding and calling appropriately.     Problem: Infection  Goal: Will remain free from infection  Outcome: PROGRESSING AS EXPECTED  Patient has received PO ABX per POC/MAR.

## 2017-06-21 NOTE — PROGRESS NOTES
Received bedside report and assumed care of patient.  Assessment complete; discussed POC with patient.  AO x4, patient calls for assistance.  Patient appears calm and exhibits no signs of distress.  Patient reports pain of 6/10, medicating per MAR PRN with oxycodone IR 5mg.  Right arm splinted and wrapped, YOANA incisions beneath dressing  Sacral mepilex from prior to arrival deep tissue injury changed, area assessed, still not blanching.  Educated pt on the need to keep pressure off this high risk area and pt verbalized agreement. Pt turning to sides more frequently at this time.  Patient tolerating current diet.  BS normoactive x 4, LBM PTA, patient voids ambulating to BR with FWW or handheld assist.  Patient is 1x assist with FWW, gait shuffling.  Call light within reach, bed in lowest position, SCDs in use, bed alarm refused.  Will continue to monitor pt for changes in status and provide care.

## 2017-06-21 NOTE — PROGRESS NOTES
Report received from ICU.  Assessment complete.  A&O x 4. Patient calls appropriately.  Denies numbness and tingling.  Patient has 7/10 pain. MAR medication given  Denies N&V. Tolerating diet.  + void  Patient denies SOB.  Review plan with of care with patient. Call light and personal belongings with in reach. Hourly rounding in place. All needs met at this time.

## 2017-06-22 PROBLEM — Z59.9 FINANCIAL DIFFICULTIES: Status: ACTIVE | Noted: 2017-06-22

## 2017-06-22 LAB
BACTERIA UR CULT: NORMAL
EKG IMPRESSION: NORMAL
SIGNIFICANT IND 70042: NORMAL
SITE SITE: NORMAL
SOURCE SOURCE: NORMAL

## 2017-06-22 PROCEDURE — A9270 NON-COVERED ITEM OR SERVICE: HCPCS | Performed by: HOSPITALIST

## 2017-06-22 PROCEDURE — 700102 HCHG RX REV CODE 250 W/ 637 OVERRIDE(OP): Performed by: HOSPITALIST

## 2017-06-22 PROCEDURE — 93005 ELECTROCARDIOGRAM TRACING: CPT | Performed by: HOSPITALIST

## 2017-06-22 PROCEDURE — 700111 HCHG RX REV CODE 636 W/ 250 OVERRIDE (IP): Performed by: HOSPITALIST

## 2017-06-22 PROCEDURE — 770006 HCHG ROOM/CARE - MED/SURG/GYN SEMI*

## 2017-06-22 PROCEDURE — 93010 ELECTROCARDIOGRAM REPORT: CPT | Performed by: INTERNAL MEDICINE

## 2017-06-22 PROCEDURE — 99233 SBSQ HOSP IP/OBS HIGH 50: CPT | Performed by: INTERNAL MEDICINE

## 2017-06-22 RX ADMIN — STANDARDIZED SENNA CONCENTRATE AND DOCUSATE SODIUM 2 TABLET: 8.6; 5 TABLET, FILM COATED ORAL at 20:05

## 2017-06-22 RX ADMIN — STANDARDIZED SENNA CONCENTRATE AND DOCUSATE SODIUM 2 TABLET: 8.6; 5 TABLET, FILM COATED ORAL at 10:51

## 2017-06-22 RX ADMIN — THERA TABS 1 TABLET: TAB at 10:51

## 2017-06-22 RX ADMIN — CEPHALEXIN 250 MG: 250 CAPSULE ORAL at 00:34

## 2017-06-22 RX ADMIN — OXYCODONE HYDROCHLORIDE 5 MG: 5 TABLET ORAL at 18:33

## 2017-06-22 RX ADMIN — OXYCODONE HYDROCHLORIDE 5 MG: 5 TABLET ORAL at 00:53

## 2017-06-22 RX ADMIN — OXYCODONE HYDROCHLORIDE 5 MG: 5 TABLET ORAL at 05:42

## 2017-06-22 RX ADMIN — FOLIC ACID 1 MG: 1 TABLET ORAL at 10:51

## 2017-06-22 RX ADMIN — OXYCODONE HYDROCHLORIDE 5 MG: 5 TABLET ORAL at 14:23

## 2017-06-22 RX ADMIN — Medication 100 MG: at 10:51

## 2017-06-22 RX ADMIN — OXYCODONE HYDROCHLORIDE 5 MG: 5 TABLET ORAL at 10:56

## 2017-06-22 RX ADMIN — HEPARIN SODIUM 5000 UNITS: 5000 INJECTION, SOLUTION INTRAVENOUS; SUBCUTANEOUS at 14:23

## 2017-06-22 RX ADMIN — HEPARIN SODIUM 5000 UNITS: 5000 INJECTION, SOLUTION INTRAVENOUS; SUBCUTANEOUS at 21:54

## 2017-06-22 RX ADMIN — OXYCODONE HYDROCHLORIDE 5 MG: 5 TABLET ORAL at 22:43

## 2017-06-22 RX ADMIN — CEPHALEXIN 250 MG: 250 CAPSULE ORAL at 18:32

## 2017-06-22 RX ADMIN — CEPHALEXIN 250 MG: 250 CAPSULE ORAL at 14:23

## 2017-06-22 RX ADMIN — CEPHALEXIN 250 MG: 250 CAPSULE ORAL at 22:43

## 2017-06-22 RX ADMIN — CEPHALEXIN 250 MG: 250 CAPSULE ORAL at 05:42

## 2017-06-22 RX ADMIN — HEPARIN SODIUM 5000 UNITS: 5000 INJECTION, SOLUTION INTRAVENOUS; SUBCUTANEOUS at 05:42

## 2017-06-22 ASSESSMENT — ENCOUNTER SYMPTOMS
DIZZINESS: 0
SHORTNESS OF BREATH: 0
WHEEZING: 0
INSOMNIA: 0
BLOOD IN STOOL: 0
PALPITATIONS: 0
HEADACHES: 0
SEIZURES: 0
WEAKNESS: 0
EYE REDNESS: 0
NERVOUS/ANXIOUS: 0
MYALGIAS: 1
LOSS OF CONSCIOUSNESS: 0
CHILLS: 0
EYE PAIN: 0
ABDOMINAL PAIN: 0
TREMORS: 0
FOCAL WEAKNESS: 0
HEMOPTYSIS: 0
CONSTIPATION: 0
VOMITING: 0
DIARRHEA: 0
FEVER: 0
FALLS: 0
NAUSEA: 0
COUGH: 0

## 2017-06-22 ASSESSMENT — PAIN SCALES - GENERAL
PAINLEVEL_OUTOF10: 5
PAINLEVEL_OUTOF10: 7
PAINLEVEL_OUTOF10: 6

## 2017-06-22 NOTE — ASSESSMENT & PLAN NOTE
Did not want to be discharged back to the streets as she has no money, no insurance and is homeless. Consulted .   to provide financial resources and as much help as possible for her to make to her appointments.

## 2017-06-22 NOTE — PROGRESS NOTES
Bedside report received.  Assessment complete.  A&O x 4. Patient calls appropriately.  Denies numbness and tingling. Moves extremities.   Patient up with standby assist  Patient has 7/10 pain. MAR medication given.  Denies N&V. Tolerating diet.  +void, + flatus  Patient denies SOB.  Review plan with of care with patient. Call light and personal belongings with in reach. Hourly rounding in place. All needs met at this time.

## 2017-06-22 NOTE — PROGRESS NOTES
Received bedside report and assumed care of patient.  Assessment complete; discussed POC with patient.  AO x4, patient calls for assistance.  Patient appears calm and exhibits no signs of distress.  Patient reports pain of 6/10, medicating per MAR PRN with oxycodone IR 5mg.  Right arm splinted and wrapped, YOANA incisions beneath dressing  Sacral mepilex from prior to arrival deep tissue injury still in place, area assessed, slight blancing.  Educated pt on the need to continue to keep pressure off this high risk area and pt verbalized agreement. Pt turning to sides more frequently at this time.  Patient tolerating current diet.  BS normoactive x 4, LBM today 6/22/17, patient voids ambulating to BR with FWW or handheld assist.  Patient is self ambulating, gait steady.  Call light within reach, bed in lowest position, SCDs in use, bed alarm refused.  Will continue to monitor pt for changes in status and provide care.

## 2017-06-22 NOTE — CARE PLAN
Problem: Pain Management  Goal: Pain level will decrease to patient’s comfort goal  Outcome: PROGRESSING AS EXPECTED  Assessed pain and medicated per MAR.  Reminded patient to report any changes in severity or quality of pain in order to best manage pain.    Problem: Skin Integrity  Goal: Risk for impaired skin integrity will decrease  Outcome: PROGRESSING AS EXPECTED  Educated pt on sacral deep pressure injury and risk factors that may worsen this wound. Reminded pt to turn to sides at least once every hour and to minimize time spent directly on back.  Changed mepilex and assessed skin.    Problem: Psychosocial Needs:  Goal: Level of anxiety will decrease  Outcome: PROGRESSING AS EXPECTED  Decreased stimuli, dimmed lighting, provided emotional support, reduced noise levels and minimized interruptions.

## 2017-06-22 NOTE — PROGRESS NOTES
[facility] Hospitalist Progress Note    Date of Service: 2017    Chief Complaint  62 y.o. female admitted 2017 with right hand pain, significant laceration at her hand from a window, apparent infection and sepsis    Interval Problem Update  In ICU overnight, blood pressure stable  Disorganized thoughts this morning but she is cooperative  Some pain at right hand, dressing bothersome  Asks when she can go home    Consultants/Specialty  Plastic Surgery    Disposition  TBD        Review of Systems   Constitutional: Negative for fever and chills.   HENT: Negative for congestion, hearing loss and nosebleeds.    Eyes: Negative for pain and redness.   Respiratory: Negative for cough, hemoptysis, shortness of breath and wheezing.    Cardiovascular: Negative for chest pain and palpitations.   Gastrointestinal: Negative for nausea, vomiting, abdominal pain, diarrhea, constipation and blood in stool.   Genitourinary: Negative for dysuria, frequency and hematuria.   Musculoskeletal: Positive for myalgias and joint pain. Negative for falls.   Skin: Negative for itching and rash.   Neurological: Negative for dizziness, tremors, focal weakness, seizures, loss of consciousness, weakness and headaches.   Psychiatric/Behavioral: The patient is not nervous/anxious and does not have insomnia.    All other systems reviewed and are negative.     Physical Exam  Laboratory/Imaging   Hemodynamics  Temp (24hrs), Av.6 °C (97.8 °F), Min:36.3 °C (97.4 °F), Max:36.9 °C (98.5 °F)   Temperature: 36.4 °C (97.6 °F)  Pulse  Av.5  Min: 66  Max: 101    Blood Pressure: 117/74 mmHg      Respiratory      Respiration: 18, Pulse Oximetry: 100 %        RUL Breath Sounds: Clear, RML Breath Sounds: Clear, RLL Breath Sounds: Clear, MAZIN Breath Sounds: Clear, LLL Breath Sounds: Clear    Fluids    Intake/Output Summary (Last 24 hours) at 17 1615  Last data filed at 17 0400   Gross per 24 hour   Intake    740 ml   Output      0 ml   Net     740 ml       Nutrition  Orders Placed This Encounter   Procedures   • DIET ORDER     Standing Status: Standing      Number of Occurrences: 1      Standing Expiration Date:      Order Specific Question:  Diet:     Answer:  Regular [1]     Order Specific Question:  Texture/Fiber modifications:     Answer:  Dysphagia 2(Pureed/Chopped)specify fluid consistency(question 6) [2]     Order Specific Question:  Consistency/Fluid modifications:     Answer:  Thin Liquids [3]     Physical Exam   Constitutional: She is oriented to person, place, and time.   Thin, disheveled, unkempt   HENT:   Head: Normocephalic and atraumatic.   Eyes: Conjunctivae are normal. Right eye exhibits no discharge. Left eye exhibits no discharge. No scleral icterus.   Cardiovascular: Normal rate, regular rhythm and intact distal pulses.    No murmur heard.  Pulmonary/Chest: Effort normal and breath sounds normal. No respiratory distress. She has no wheezes.   Mass at right breast   Abdominal: Soft. Bowel sounds are normal. She exhibits no distension. There is no tenderness.   Musculoskeletal: Normal range of motion. She exhibits edema.   Chronic hip pain   Neurological: She is alert and oriented to person, place, and time. No cranial nerve deficit.   Psychiatric:   Tangential and disorganized  Is cooperative                                Assessment/Plan     * Hand laceration (present on admission)  Assessment & Plan  Traumatic, from putting hand through a window  S/P surgical repair by Dr. Madsen  Cast in place, [facility].  Dr. Madsen to clear her for discharge from his standpoint,    Financial difficulties  Assessment & Plan  Did not want to be discharged back to the streets as she has no money, no insurance and is homeless. Consulted .    Osteoarthritis of right hip (present on admission)  Assessment & Plan  As seen on CT pelvis 6/20. Follow up in the outpatient, refer to orthopedics; meanwhile pain control.    Severe sepsis (CMS-HCC)  (present on admission)  Assessment & Plan  Source is soft tissue, hand laceration  Received tetanus shot.  End organ dysfunction includes acute renal failure  Blood pressure stable, did not require pressors  Cultures negative. On Keflex.  Continue Kefles, would expect total 10 to 14d course.    Breast cancer (CMS-[facility]) (present on admission)  Assessment & Plan  Advanced disease  She has elected to not seek care for this   Appears metastatic  Would recommend outpatient follow up outpatient though she is not interested and understands that she may die of breast cancer. She has had the lesions since 2011 and was referred for evaluation then in [place].    ARF (acute renal failure) (CMS-[facility]) (present on admission)  Assessment & Plan  Related to sepsis  Improved  Avoid nephrotoxins    Acute encephalopathy (present on admission)  Assessment & Plan  Consistent with delirium from sepsis with possible psych disorder and drug use thus likely multifactorial  Improved, patient is oriented to situation and cooperative    Increased anion gap metabolic acidosis (present on admission)  Assessment & Plan  Resolved with fluids    I spent 40 minutes, reviewing the chart, notes, vitals, labs, imaging, ordering labs, evaluating Dougie Baptiste for assessment, enacting the plan above. Time was devoted to counseling and coordinating care including review of records, pertinent lab data and studies, as well as discussing diagnostic evaluation and work up, planned therapeutic interventions and future disposition of care. Where indicated, the assessment and plan reflect discussion of patient with consultants, other healthcare providers, family members, and additional research needed to obtain further information in formulating the plan of care for Dougie Baptiste.     Labs reviewed and Medications reviewed  Arenas catheter: No Arenas            Antibiotics: Treating active infection/contamination beyond 24 hours perioperative coverage

## 2017-06-23 VITALS
WEIGHT: 112.21 LBS | RESPIRATION RATE: 18 BRPM | OXYGEN SATURATION: 98 % | HEIGHT: 66 IN | SYSTOLIC BLOOD PRESSURE: 141 MMHG | DIASTOLIC BLOOD PRESSURE: 79 MMHG | BODY MASS INDEX: 18.03 KG/M2 | HEART RATE: 97 BPM | TEMPERATURE: 97.6 F

## 2017-06-23 LAB
ANION GAP SERPL CALC-SCNC: 3 MMOL/L (ref 0–11.9)
BUN SERPL-MCNC: 15 MG/DL (ref 8–22)
CALCIUM SERPL-MCNC: 9.2 MG/DL (ref 8.5–10.5)
CHLORIDE SERPL-SCNC: 112 MMOL/L (ref 96–112)
CO2 SERPL-SCNC: 24 MMOL/L (ref 20–33)
CREAT SERPL-MCNC: 0.45 MG/DL (ref 0.5–1.4)
ERYTHROCYTE [DISTWIDTH] IN BLOOD BY AUTOMATED COUNT: 55.5 FL (ref 35.9–50)
GFR SERPL CREATININE-BSD FRML MDRD: >60 ML/MIN/1.73 M 2
GLUCOSE SERPL-MCNC: 97 MG/DL (ref 65–99)
HCT VFR BLD AUTO: 29.2 % (ref 37–47)
HGB BLD-MCNC: 9.5 G/DL (ref 12–16)
MCH RBC QN AUTO: 31.7 PG (ref 27–33)
MCHC RBC AUTO-ENTMCNC: 32.5 G/DL (ref 33.6–35)
MCV RBC AUTO: 97.3 FL (ref 81.4–97.8)
PLATELET # BLD AUTO: 382 K/UL (ref 164–446)
PMV BLD AUTO: 9.3 FL (ref 9–12.9)
POTASSIUM SERPL-SCNC: 4.2 MMOL/L (ref 3.6–5.5)
RBC # BLD AUTO: 3 M/UL (ref 4.2–5.4)
SODIUM SERPL-SCNC: 139 MMOL/L (ref 135–145)
WBC # BLD AUTO: 7.6 K/UL (ref 4.8–10.8)

## 2017-06-23 PROCEDURE — A9270 NON-COVERED ITEM OR SERVICE: HCPCS | Performed by: HOSPITALIST

## 2017-06-23 PROCEDURE — 85027 COMPLETE CBC AUTOMATED: CPT

## 2017-06-23 PROCEDURE — 700102 HCHG RX REV CODE 250 W/ 637 OVERRIDE(OP): Performed by: HOSPITALIST

## 2017-06-23 PROCEDURE — 700111 HCHG RX REV CODE 636 W/ 250 OVERRIDE (IP): Performed by: HOSPITALIST

## 2017-06-23 PROCEDURE — 36415 COLL VENOUS BLD VENIPUNCTURE: CPT

## 2017-06-23 PROCEDURE — 99239 HOSP IP/OBS DSCHRG MGMT >30: CPT | Performed by: INTERNAL MEDICINE

## 2017-06-23 PROCEDURE — 80048 BASIC METABOLIC PNL TOTAL CA: CPT

## 2017-06-23 RX ORDER — NICOTINE 14MG/24HR
1 PATCH, TRANSDERMAL 24 HOURS TRANSDERMAL
Qty: 12 CAP | Refills: 0 | COMMUNITY
Start: 2017-06-23 | End: 2018-01-13

## 2017-06-23 RX ORDER — CEPHALEXIN 250 MG/1
250 CAPSULE ORAL EVERY 6 HOURS
Qty: 24 CAP | Refills: 0 | Status: SHIPPED | OUTPATIENT
Start: 2017-06-23 | End: 2017-06-29

## 2017-06-23 RX ADMIN — HEPARIN SODIUM 5000 UNITS: 5000 INJECTION, SOLUTION INTRAVENOUS; SUBCUTANEOUS at 05:43

## 2017-06-23 RX ADMIN — Medication 100 MG: at 08:46

## 2017-06-23 RX ADMIN — OXYCODONE HYDROCHLORIDE 5 MG: 5 TABLET ORAL at 08:46

## 2017-06-23 RX ADMIN — OXYCODONE HYDROCHLORIDE 5 MG: 5 TABLET ORAL at 12:21

## 2017-06-23 RX ADMIN — OXYCODONE HYDROCHLORIDE 5 MG: 5 TABLET ORAL at 16:34

## 2017-06-23 RX ADMIN — FOLIC ACID 1 MG: 1 TABLET ORAL at 08:46

## 2017-06-23 RX ADMIN — THERA TABS 1 TABLET: TAB at 08:46

## 2017-06-23 RX ADMIN — STANDARDIZED SENNA CONCENTRATE AND DOCUSATE SODIUM 2 TABLET: 8.6; 5 TABLET, FILM COATED ORAL at 08:46

## 2017-06-23 RX ADMIN — CEPHALEXIN 250 MG: 250 CAPSULE ORAL at 05:43

## 2017-06-23 RX ADMIN — OXYCODONE HYDROCHLORIDE 5 MG: 5 TABLET ORAL at 03:49

## 2017-06-23 RX ADMIN — CEPHALEXIN 250 MG: 250 CAPSULE ORAL at 17:51

## 2017-06-23 RX ADMIN — CEPHALEXIN 250 MG: 250 CAPSULE ORAL at 12:21

## 2017-06-23 ASSESSMENT — PAIN SCALES - GENERAL: PAINLEVEL_OUTOF10: 4

## 2017-06-23 NOTE — PROGRESS NOTES
DATE OF SERVICE:  06/22/2017    Postoperative day #4 from repair of multiple lacerations of the forearm,   repair of the flexor tendon, FDS and FDP bundles and also ulnar nerve repair.    The patient's right arm is stable in the posterior splint.  Function will be   questionable due to ulnar nerve injury more likely she will develop a claw   hand due to the high transection of the ulnar nerve.  Flexor tendon repairs   will certainly be an issue regarding scar contracture, but they should   function relatively well.  I think the big issue will be the ulnar nerve   injury.  Currently, grossly intact with no signs of infection.  Okay to   discharge patient whenever hospitalist feel patient stable.  Patient to call my office and follow up in my office   for suture removal in the next 2 weeks.  Otherwise, keep the right hand up and   elevated.  The patient is okay to shower, use a plastic bag so that her   dressing and splint do not get wet.  Obviously, at this point, she is left   hand only in regards to function.       ____________________________________     MD ASA [place] / JOSÉ    DD:  06/22/2017 10:33:03  DT:  06/22/2017 19:59:03    D#:  [id number]  Job#:  [id number]

## 2017-06-23 NOTE — DISCHARGE PLANNING
Received call from [place] at [facility], they have arranged transport for patient for for 1700 today with [facility] [employer] [facility].  Message left for bedside nurse Damaso at [phone].

## 2017-06-23 NOTE — PROGRESS NOTES
Bedside report completed. Pt has no complaints at this time. Hourly rounding in place. Will continue to monitor throughout night.

## 2017-06-23 NOTE — DISCHARGE SUMMARY
HOSPITAL MEDICINE DISCHARGE SUMMARY    Name: Dougie Baptiste  MRN: [id number]  : 1955  Admit Date: 2017  Discharge Date: 2017  Attending Provider: Bret Ramires M.D.  Primary Care Physician: No primary care provider on file.    DISCHARGE DIAGNOSES, PLAN AND FOLLOW-UP AND HOSPITAL COURSE  Please review Dr. Mert Madsen M.D. notes for further details of history of present illness, past medical/social/family histories, allergies and medications. Please review Dr. Cochran admission note, basically the attending surgeon    * Hand laceration (present on admission)  Assessment & Plan  Traumatic, from putting hand through a window  S/P surgical repair by Dr. Madsen  Cast in place, [facility].  Dr. Madsen, Surgery wanted her to follow up in 2 weeks for suture removal and evaluation at clinic  Assign and follow up to primary care provider in 1-2 weeks for general check up    Financial difficulties  Assessment & Plan  Did not want to be discharged back to the streets as she has no money, no insurance and is homeless. Consulted .   to provide financial resources and as much help as possible for her to make to her appointments.    Osteoarthritis of right hip (present on admission)  Assessment & Plan  As seen on CT pelvis .  Assign and follow up to primary care provider and referral to Orthopedics clinic deferred to them    Severe sepsis (CMS-HCC) (present on admission)  Assessment & Plan  Source is soft tissue, hand laceration  Received tetanus shot.  End organ dysfunction includes acute renal failure  Blood pressure stable, did not require pressors  Cultures negative. On Keflex.  Continue Kefles, would expect total 10 to 14d course.  Discharged on Keflex for a 10 day course. Assign and follow up with primary care provider in 1-2 weeks.  Follow up with Dr. Madsen, Surgery in 2 weeks.    Breast cancer (CMS-HCC) (present on admission)  Assessment & Plan  Advanced disease  She has elected to  not seek care for this   Appears metastatic  Would recommend outpatient follow up outpatient though she is not interested and understands that she may die of breast cancer. She has had the lesions since 2011 and was referred for evaluation then in [place].  Assign and follow up with primary care provider in 1-2 weeks to follow up on this    ARF (acute renal failure) (CMS-HCC) (present on admission)  Assessment & Plan  Related to sepsis  Resolved  Avoid nephrotoxins    Acute encephalopathy (present on admission)  Assessment & Plan  Consistent with delirium from sepsis with possible psych disorder and drug use thus likely multifactorial  Improved, patient is oriented to situation and cooperative  Resolved. Back to normal mentation.    Increased anion gap metabolic acidosis (present on admission)  Assessment & Plan  Resolved with fluids      Dougie Baptiste improved and was deemed ready to be discharged from the hospital as there were no further inpatient needs. Dougie Baptiste felt comfortable going home with financial resources made available to her by . The discharge plan was discussed with Dougie Baptiste, and agreed to it. Dougie Baptiste was subsequently discharged in improved and stable condition.    DISCHARGE MEDICATIONS:    Dougie Baptiste   Sal Medication Instructions CHARAN:[id number]    Printed on:06/23/17 1328   Medication Information                      cephALEXin (KEFLEX) 250 MG Cap  Take 1 Cap by mouth every 6 hours for 6 days.             Saccharomyces boulardii (PROBIOTIC) 250 MG Cap  Take 1 Cap by mouth.                 DISCHARGE VITALS, LABS and IMAGING  Filed Vitals:    06/22/17 1505 06/22/17 1910 06/23/17 0400 06/23/17 0725   BP: 122/73 129/71 113/67 110/62   Pulse: 91 79 68 70   Temp: 37.2 °C (99 °F) 36.9 °C (98.4 °F) 36.6 °C (97.8 °F) 36.4 °C (97.6 °F)   Resp: 18 17 16 16   Height:       Weight:       SpO2: 97% 98% 98% 98%     Lab Results   Component Value Date/Time    WBC 7.6 06/23/2017 03:34 AM    RBC  3.00* 06/23/2017 03:34 AM    HEMOGLOBIN 9.5* 06/23/2017 03:34 AM    HEMATOCRIT 29.2* 06/23/2017 03:34 AM    MCV 97.3 06/23/2017 03:34 AM    MCH 31.7 06/23/2017 03:34 AM    MCHC 32.5* 06/23/2017 03:34 AM    MPV 9.3 06/23/2017 03:34 AM    NEUTROPHILS-POLYS 86.60* 06/19/2017 12:56 AM    LYMPHOCYTES 7.00* 06/19/2017 12:56 AM    MONOCYTES 5.60 06/19/2017 12:56 AM    EOSINOPHILS 0.10 06/19/2017 12:56 AM    BASOPHILS 0.30 06/19/2017 12:56 AM      Lab Results   Component Value Date/Time    SODIUM 139 06/23/2017 03:34 AM    POTASSIUM 4.2 06/23/2017 03:34 AM    CHLORIDE 112 06/23/2017 03:34 AM    CO2 24 06/23/2017 03:34 AM    GLUCOSE 97 06/23/2017 03:34 AM    BUN 15 06/23/2017 03:34 AM    CREATININE 0.45* 06/23/2017 03:34 AM      No results found for: PROTHROMBTM, INR     Ct-head W/o    6/19/2017 6/19/2017 2:14 AM HISTORY/REASON FOR EXAM:  Psychosis/Delirium. TECHNIQUE/EXAM DESCRIPTION AND NUMBER OF VIEWS: CT of the head without contrast. The study was performed on a UmbaBox CT scanner. Contiguous 5 mm axial sections were obtained from the skull base through the vertex. Up to date radiation dose reduction adjustments have been utilized to meet ALARA standards for radiation dose reduction. COMPARISON:  None available FINDINGS: The calvaria are normal in appearance. Mastoid air cells and visualized paranasal sinuses are clear. The visualized portions of the orbits are normal in appearance. The brain parenchyma is normal in appearance. A cavum septum lucid is incidentally noted. There is no hemorrhage.  There is no evidence for acute infarct. The ventricular system is normal in size and position. There are no extra-axial fluid collection present.     6/19/2017  No acute intracranial abnormality.    Ct-pelvis W/o    6/20/2017 6/20/2017 9:33 AM HISTORY/REASON FOR EXAM:  Atraumatic Pain/Deformity Chronic pelvic pain. TECHNIQUE/EXAM DESCRIPTION AND NUMBER OF VIEWS: CT scan of the pelvis without contrast. Noncontrast helical sections  were obtained from the iliac crests through the pubic symphysis. Low dose optimization technique was utilized for this CT exam including automated exposure control and adjustment of the mA and/or kV according to patient size. COMPARISON: None. FINDINGS: No fracture or dislocation. Severe osteoarthritis of the right hip joint with extensive remodeling and deformity of the femoral head as well as extensive subchondral cystic change and sclerosis in the femoral head and acetabulum. Mild right hip joint effusion. Moderate osteoarthritis of the right SI joint. Mild osteoarthritis of the left hip joint and left SI joint. Mild osteoarthritis of the pubic symphysis. Moderate degenerative change at L5-S1. Distended urinary bladder.     6/20/2017  No acute fracture or dislocation. Severe osteoarthritis of the right hip joint with mild hip joint effusion.      Please see discharge diagnoses, plan and follow up above for details of pending tests and postdischarge instructions for the clinic providers and specialists.    Please CC Dr. Mert Madsen, Plastic Surgery, assigned primary care provider.    For further details on discharge medications, patient education, diet, and activity, please refer to electronic copy of discharge instructions.       TIME SPENT: 39 minutes, with greater than 50% of the time spent on face-to-face encounter, addressing medical issues, coordination of care, counseling, discharge planning, medication reconciliation, and documentation.

## 2017-06-23 NOTE — DISCHARGE PLANNING
Per CCT Sofy spoke with Brittany at [facility] they are aware of patients request for transport and she will return call as soon as they confirm transport. CCT Sofy advised.

## 2017-06-23 NOTE — DISCHARGE INSTRUCTIONS
Discharge Instructions    Discharged to home by taxi with escort. Discharged via walking, hospital escort: Refused.  Special equipment needed: Not Applicable    Be sure to schedule a follow-up appointment with your primary care doctor or any specialists as instructed.     Discharge Plan:   Diet Plan: Discussed  Activity Level: Discussed  Smoking Cessation Offered: Patient Counseled  Confirmed Follow up Appointment: Appointment Scheduled  Confirmed Symptoms Management: Discussed  Medication Reconciliation Updated: Yes  Pneumococcal Vaccine Given - only chart on this line when given:  (Pt refuses)  Influenza Vaccine Indication: Patient Refuses    I understand that a diet low in cholesterol, fat, and sodium is recommended for good health. Unless I have been given specific instructions below for another diet, I accept this instruction as my diet prescription.   Other diet: Regular    Special Instructions: None    · Is patient discharged on Warfarin / Coumadin?   No     · Is patient Post Blood Transfusion?  No    Laceration Care, Adult  A laceration is a cut that goes through all layers of the skin. The cut also goes into the tissue that is right under the skin. Some cuts heal on their own. Others need to be closed with stitches (sutures), staples, skin adhesive strips, or wound glue. Taking care of your cut lowers your risk of infection and helps your cut to heal better.  HOW TO TAKE CARE OF YOUR CUT  For stitches or staples:  · Keep the wound clean and dry.  · If you were given a bandage (dressing), you should change it at least one time per day or as told by your doctor. You should also change it if it gets wet or dirty.  · Keep the wound completely dry for the first 24 hours or as told by your doctor. After that time, you may take a shower or a bath. However, make sure that the wound is not soaked in water until after the stitches or staples have been removed.  · Clean the wound one time each day or as told by your  doctor:  ¨ Wash the wound with soap and water.  ¨ Rinse the wound with water until all of the soap comes off.  ¨ Pat the wound dry with a clean towel. Do not rub the wound.  · After you clean the wound, put a thin layer of antibiotic ointment on it as told by your doctor. This ointment:  ¨ Helps to prevent infection.  ¨ Keeps the bandage from sticking to the wound.  · Have your stitches or staples removed as told by your doctor.  If your doctor used skin adhesive strips:   · Keep the wound clean and dry.  · If you were given a bandage, you should change it at least one time per day or as told by your doctor. You should also change it if it gets dirty or wet.  · Do not get the skin adhesive strips wet. You can take a shower or a bath, but be careful to keep the wound dry.  · If the wound gets wet, pat it dry with a clean towel. Do not rub the wound.  · Skin adhesive strips fall off on their own. You can trim the strips as the wound heals. Do not remove any strips that are still stuck to the wound. They will fall off after a while.  If your doctor used wound glue:  · Try to keep your wound dry, but you may briefly wet it in the shower or bath. Do not soak the wound in water, such as by swimming.  · After you take a shower or a bath, gently pat the wound dry with a clean towel. Do not rub the wound.  · Do not do any activities that will make you really sweaty until the skin glue has fallen off on its own.  · Do not apply liquid, cream, or ointment medicine to your wound while the skin glue is still on.  · If you were given a bandage, you should change it at least one time per day or as told by your doctor. You should also change it if it gets dirty or wet.  · If a bandage is placed over the wound, do not let the tape for the bandage touch the skin glue.  · Do not pick at the glue. The skin glue usually stays on for 5-10 days. Then, it falls off of the skin.  General Instructions   · To help prevent scarring, make sure  to cover your wound with sunscreen whenever you are outside after stitches are removed, after adhesive strips are removed, or when wound glue stays in place and the wound is healed. Make sure to wear a sunscreen of at least 30 SPF.  · Take over-the-counter and prescription medicines only as told by your doctor.  · If you were given antibiotic medicine or ointment, take or apply it as told by your doctor. Do not stop using the antibiotic even if your wound is getting better.  · Do not scratch or pick at the wound.  · Keep all follow-up visits as told by your doctor. This is important.  · Check your wound every day for signs of infection. Watch for:  ¨ Redness, swelling, or pain.  ¨ Fluid, blood, or pus.  · Raise (elevate) the injured area above the level of your heart while you are sitting or lying down, if possible.  GET HELP IF:  · You got a tetanus shot and you have any of these problems at the injection site:  ¨ Swelling.  ¨ Very bad pain.  ¨ Redness.  ¨ Bleeding.  · You have a fever.  · A wound that was closed breaks open.  · You notice a bad smell coming from your wound or your bandage.  · You notice something coming out of the wound, such as wood or glass.  · Medicine does not help your pain.  · You have more redness, swelling, or pain at the site of your wound.  · You have fluid, blood, or pus coming from your wound.  · You notice a change in the color of your skin near your wound.  · You need to change the bandage often because fluid, blood, or pus is coming from the wound.  · You start to have a new rash.  · You start to have numbness around the wound.  GET HELP RIGHT AWAY IF:  · You have very bad swelling around the wound.  · Your pain suddenly gets worse and is very bad.  · You notice painful lumps near the wound or on skin that is anywhere on your body.  · You have a red streak going away from your wound.  · The wound is on your hand or foot and you cannot move a finger or toe like you usually  can.  · The wound is on your hand or foot and you notice that your fingers or toes look pale or bluish.     This information is not intended to replace advice given to you by your health care provider. Make sure you discuss any questions you have with your health care provider.     Document Released: 06/05/2009 Document Revised: 05/03/2016 Document Reviewed: 12/14/2015  [employer] Interactive Patient Education ©2016 [employer]      Wound Care  Taking care of your wound properly can help to prevent pain and infection. It can also help your wound to heal more quickly.   HOW TO CARE FOR YOUR WOUND   · Take or apply over-the-counter and prescription medicines only as told by your health care provider.  · If you were prescribed antibiotic medicine, take or apply it as told by your health care provider. Do not stop using the antibiotic even if your condition improves.  · Clean the wound each day or as told by your health care provider.  ¨ Wash the wound with mild soap and water.  ¨ Rinse the wound with water to remove all soap.  ¨ Pat the wound dry with a clean towel. Do not rub it.  · There are many different ways to close and cover a wound. For example, a wound can be covered with stitches (sutures), skin glue, or adhesive strips. Follow instructions from your health care provider about:  ¨ How to take care of your wound.  ¨ When and how you should change your bandage (dressing).  ¨ When you should remove your dressing.  ¨ Removing whatever was used to close your wound.  · Check your wound every day for signs of infection. Watch for:  ¨ Redness, swelling, or pain.  ¨ Fluid, blood, or pus.  · Keep the dressing dry until your health care provider says it can be removed. Do not take baths, swim, use a hot tub, or do anything that would put your wound underwater until your health care provider approves.  · Raise (elevate) the injured area above the level of your heart while you are sitting or lying down.  · Do not scratch  or pick at the wound.  · Keep all follow-up visits as told by your health care provider. This is important.  SEEK MEDICAL CARE IF:  · You received a tetanus shot and you have swelling, severe pain, redness, or bleeding at the injection site.  · You have a fever.  · Your pain is not controlled with medicine.  · You have increased redness, swelling, or pain at the site of your wound.  · You have fluid, blood, or pus coming from your wound.  · You notice a bad smell coming from your wound or your dressing.  SEEK IMMEDIATE MEDICAL CARE IF:  · You have a red streak going away from your wound.     This information is not intended to replace advice given to you by your health care provider. Make sure you discuss any questions you have with your health care provider.     Document Released: 09/26/2009 Document Revised: 05/03/2016 Document Reviewed: 12/14/2015  [employer] Interactive Patient Education ©2016 [employer]      Depression / Suicide Risk    As you are discharged from this [facility], it is important to learn how to keep safe from harming yourself.    Recognize the warning signs:  · Abrupt changes in personality, positive or negative- including increase in energy   · Giving away possessions  · Change in eating patterns- significant weight changes-  positive or negative  · Change in sleeping patterns- unable to sleep or sleeping all the time   · Unwillingness or inability to communicate  · Depression  · Unusual sadness, discouragement and loneliness  · Talk of wanting to die  · Neglect of personal appearance   · Rebelliousness- reckless behavior  · Withdrawal from people/activities they love  · Confusion- inability to concentrate     If you or a loved one observes any of these behaviors or has concerns about self-harm, here's what you can do:  · Talk about it- your feelings and reasons for harming yourself  · Remove any means that you might use to hurt yourself (examples: pills, rope, extension cords,  firearm)  · Get professional help from the community (Mental Health, Substance Abuse, psychological counseling)  · Do not be alone:Call your Safe Contact- someone whom you trust who will be there for you.  · Call your local CRISIS HOTLINE 586-7880 or 724-996-8860  · Call your local Children's Mobile Crisis Response Team [place] (186) 764-6517 or www.viVood  · Call the toll free National Suicide Prevention Hotlines   · National Suicide Prevention Lifeline 180-609-UKUW (1228)  · National Hope Line Network 800-SUICIDE (521-3587)

## 2017-06-23 NOTE — DISCHARGE PLANNING
Pt requesting to speak with ANAY regarding discharge today.  ANAY met with pt at bedside.  Pt states she is concerned about going home because she doesn't have air conditioning.  Pt states she is out of minutes on her phone and is unable to call a friend to assist her.  ANAY called unit clerk and requested a bedside phone be brought to pt's room.  Pt agreeable to plan.  Pt states she will gladly use [facility] transport to get home, however she needs to fill her Rx's at [facility] at [facility] before going home.  Pt requested SW find her a new home in [place].  Pt's income is $130 in child support money and she gets $194 in food stamps.  SW informed pt that it will be very hard to find housing with an income of $130 and it might be beneficial for her to stay in her home in [place] (pt does not pay rent/mortgage on this home) until her financial situation changes.      Pt expected to d/c home today.  ANAY sent transport form to [facility]VARAD[facility] Mccormick to arrange [facility] transport for around 1730 this evening.  ANAY requested Anny call bedside RN at x [phone] to confirm  time.

## 2017-06-23 NOTE — PROGRESS NOTES
Patient is alert and oriented x4. IV site on right arm (antecubital) was red, swollen, and painful, so we removed it. The dressing on IV site (right wrist) was cleaned and dressing was changed. Dressing on sacrum was also changed. Patient has no other concerns other than the IV site.

## 2017-06-23 NOTE — PROGRESS NOTES
Received bedside report and assumed care of patient.  Assessment complete; discussed POC with patient.  AO x4, patient calls for assistance.  Patient appears calm and exhibits no signs of distress.  Patient reports pain of 5/10, medicating per MAR PRN with oxycodone IR 5mg.  Right arm splinted and wrapped, YOANA incisions beneath dressing  Sacral mepilex from prior to arrival deep tissue injury changed, area assessed, slight blancing.  Educated pt on the need to continue to keep pressure off this high risk area and pt verbalized agreement. Pt turning to sides more frequently and beginning to ambulate more.  Patient tolerating current diet.  BS normoactive x 4, LBM today 6/22/17, patient voids ambulating to BR with FWW or handheld assist.  Patient is self ambulating, gait steady.  Call light within reach, bed in lowest position, SCDs in use, bed alarm refused.  Will continue to monitor pt for changes in status and provide care.

## 2017-06-24 LAB
BACTERIA BLD CULT: NORMAL
BACTERIA BLD CULT: NORMAL
SIGNIFICANT IND 70042: NORMAL
SIGNIFICANT IND 70042: NORMAL
SITE SITE: NORMAL
SITE SITE: NORMAL
SOURCE SOURCE: NORMAL
SOURCE SOURCE: NORMAL

## 2017-08-21 ENCOUNTER — OFFICE VISIT (OUTPATIENT)
Dept: MEDICAL GROUP | Facility: CLINIC | Age: 62
End: 2017-08-21
Payer: MEDICAID

## 2017-08-21 VITALS
DIASTOLIC BLOOD PRESSURE: 84 MMHG | HEIGHT: 64 IN | HEART RATE: 94 BPM | TEMPERATURE: 98.8 F | SYSTOLIC BLOOD PRESSURE: 128 MMHG | RESPIRATION RATE: 16 BRPM | OXYGEN SATURATION: 96 % | WEIGHT: 111 LBS | BODY MASS INDEX: 18.95 KG/M2

## 2017-08-21 DIAGNOSIS — D64.9 ANEMIA, UNSPECIFIED TYPE: ICD-10-CM

## 2017-08-21 DIAGNOSIS — M16.11 PRIMARY OSTEOARTHRITIS OF RIGHT HIP: ICD-10-CM

## 2017-08-21 DIAGNOSIS — C50.012 MALIGNANT NEOPLASM OF NIPPLE OF LEFT BREAST IN FEMALE (HCC): ICD-10-CM

## 2017-08-21 DIAGNOSIS — F15.11 HISTORY OF METHAMPHETAMINE ABUSE (HCC): ICD-10-CM

## 2017-08-21 DIAGNOSIS — S61.411D LACERATION OF RIGHT HAND WITHOUT FOREIGN BODY, SUBSEQUENT ENCOUNTER: ICD-10-CM

## 2017-08-21 DIAGNOSIS — F10.11 HISTORY OF ETOH ABUSE: ICD-10-CM

## 2017-08-21 PROBLEM — R65.20 SEVERE SEPSIS(995.92): Status: RESOLVED | Noted: 2017-06-20 | Resolved: 2017-08-21

## 2017-08-21 PROBLEM — E87.29 INCREASED ANION GAP METABOLIC ACIDOSIS: Status: RESOLVED | Noted: 2017-06-18 | Resolved: 2017-08-21

## 2017-08-21 PROBLEM — A41.9 SEVERE SEPSIS(995.92): Status: RESOLVED | Noted: 2017-06-20 | Resolved: 2017-08-21

## 2017-08-21 PROBLEM — G93.40 ACUTE ENCEPHALOPATHY: Status: RESOLVED | Noted: 2017-06-18 | Resolved: 2017-08-21

## 2017-08-21 PROBLEM — N17.9 ARF (ACUTE RENAL FAILURE) (HCC): Status: RESOLVED | Noted: 2017-06-18 | Resolved: 2017-08-21

## 2017-08-21 PROCEDURE — 99205 OFFICE O/P NEW HI 60 MIN: CPT | Performed by: FAMILY MEDICINE

## 2017-08-21 RX ORDER — MELOXICAM 7.5 MG/1
7.5 TABLET ORAL DAILY
Qty: 30 TAB | Refills: 3 | Status: SHIPPED | OUTPATIENT
Start: 2017-08-21 | End: 2017-08-30 | Stop reason: SDUPTHER

## 2017-08-21 RX ORDER — LANOLIN ALCOHOL/MO/W.PET/CERES
325 CREAM (GRAM) TOPICAL
Qty: 90 TAB | Refills: 1 | Status: SHIPPED | OUTPATIENT
Start: 2017-08-21 | End: 2017-11-21 | Stop reason: SDUPTHER

## 2017-08-21 RX ORDER — FOLIC ACID 1 MG/1
1 TABLET ORAL DAILY
Qty: 30 TAB | Refills: 3 | Status: SHIPPED | OUTPATIENT
Start: 2017-08-21 | End: 2017-12-13 | Stop reason: SDUPTHER

## 2017-08-21 RX ORDER — LANOLIN ALCOHOL/MO/W.PET/CERES
100 CREAM (GRAM) TOPICAL DAILY
Qty: 30 TAB | Refills: 3 | Status: SHIPPED | OUTPATIENT
Start: 2017-08-21 | End: 2017-12-13 | Stop reason: SDUPTHER

## 2017-08-21 ASSESSMENT — ENCOUNTER SYMPTOMS
CHILLS: 0
GASTROINTESTINAL NEGATIVE: 1
ABDOMINAL PAIN: 0
DIZZINESS: 0
PALPITATIONS: 0
RESPIRATORY NEGATIVE: 1
NECK PAIN: 0
PSYCHIATRIC NEGATIVE: 1
EYES NEGATIVE: 1
HEADACHES: 0
ARTHRALGIAS: 1
HEMOPTYSIS: 0
FEVER: 0
CONSTITUTIONAL NEGATIVE: 1
CONSTIPATION: 0
MYALGIAS: 0
ANOREXIA: 0
FOCAL WEAKNESS: 1
HIP PAIN: 1
CARDIOVASCULAR NEGATIVE: 1
COUGH: 0

## 2017-08-21 ASSESSMENT — PATIENT HEALTH QUESTIONNAIRE - PHQ9: CLINICAL INTERPRETATION OF PHQ2 SCORE: 0

## 2017-08-21 ASSESSMENT — PAIN SCALES - GENERAL: PAINLEVEL: 9=SEVERE PAIN
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
## 2017-08-23 ENCOUNTER — TELEPHONE (OUTPATIENT)
Dept: HEMATOLOGY ONCOLOGY | Facility: MEDICAL CENTER | Age: 62
End: 2017-08-23

## 2017-08-23 DIAGNOSIS — C50.012 MALIGNANT NEOPLASM OF NIPPLE OF LEFT BREAST IN FEMALE (HCC): ICD-10-CM

## 2017-08-23 NOTE — TELEPHONE ENCOUNTER
LM on VM for pt to call with additional details concerning her left nipple neoplasm, where was it diagnosed? Where is the biopsy and pathology through?  Has she had any treatment between then and now?

## 2017-08-25 ENCOUNTER — TELEPHONE (OUTPATIENT)
Dept: HEMATOLOGY ONCOLOGY | Facility: MEDICAL CENTER | Age: 62
End: 2017-08-25

## 2017-08-25 NOTE — TELEPHONE ENCOUNTER
Patient returned call. She informs me she 'thinks' it was [facility] before the hospital was extended in [place]. Across from the [facility] in approximately 2010. She hopes this information is helpful.

## 2017-08-25 NOTE — TELEPHONE ENCOUNTER
2nd attempt to contact the patient to get additional past medical history and treatment.  Lm on voicemail for patient to call back

## 2017-08-28 ENCOUNTER — TELEPHONE (OUTPATIENT)
Dept: MEDICAL GROUP | Facility: CLINIC | Age: 62
End: 2017-08-28

## 2017-08-28 NOTE — TELEPHONE ENCOUNTER
1. Caller Name: Dougie                      Call Back Number: [phone] (home)     2. Message: Patient has been taking the meloxicam for a week - she was prescribed 1/day, however, she is finding only one is not working - she tried taking one in the a.m. And one in the p.m. And this is working for her, she is even sleeping better.  Can she do this or is there a higher dose she can take? If so, when she is out of this bottle, can she call in for a higher quantity?     3. Patient approves office to leave a detailed voicemail/[employer] message: N\A

## 2017-08-29 NOTE — TELEPHONE ENCOUNTER
Spoke to patient and she has never had any biopsy or pathology completed.  Per her she is not interested in surgical options, radiation, chemo or other standard treatment plans.  What she is requesting is an oncologist who can treat her through holistic means.  I also attempted to obtain previous records from the [facility], they referred me to [facility] for any records, neither had anything previous to 2017.  Gave the referral to Rachael to review with Dr. Whiting.

## 2017-08-30 DIAGNOSIS — M16.11 PRIMARY OSTEOARTHRITIS OF RIGHT HIP: ICD-10-CM

## 2017-08-31 RX ORDER — MELOXICAM 7.5 MG/1
7.5 TABLET ORAL 2 TIMES DAILY
Qty: 60 TAB | Refills: 1 | Status: SHIPPED | OUTPATIENT
Start: 2017-08-31 | End: 2017-11-28 | Stop reason: SDUPTHER

## 2017-09-23 ENCOUNTER — TELEPHONE (OUTPATIENT)
Dept: MEDICAL GROUP | Facility: CLINIC | Age: 62
End: 2017-09-23

## 2017-09-23 NOTE — TELEPHONE ENCOUNTER
Tried to call pt to find out where the biopsy and pathology were through, unable to get ahold of pt, Tried calling [facility] as well as lab samia

## 2017-10-24 ENCOUNTER — NON-PROVIDER VISIT (OUTPATIENT)
Dept: MEDICAL GROUP | Facility: CLINIC | Age: 62
End: 2017-10-24
Payer: MEDICAID

## 2017-10-24 ENCOUNTER — HOSPITAL ENCOUNTER (OUTPATIENT)
Facility: MEDICAL CENTER | Age: 62
End: 2017-10-24
Attending: FAMILY MEDICINE
Payer: MEDICAID

## 2017-10-24 DIAGNOSIS — Z01.89 ROUTINE LAB DRAW: ICD-10-CM

## 2017-10-24 DIAGNOSIS — D64.9 ANEMIA, UNSPECIFIED TYPE: ICD-10-CM

## 2017-10-24 LAB
ERYTHROCYTE [DISTWIDTH] IN BLOOD BY AUTOMATED COUNT: 53.9 FL (ref 35.9–50)
FOLATE SERPL-MCNC: >23.2 NG/ML
HCT VFR BLD AUTO: 49.5 % (ref 37–47)
HGB BLD-MCNC: 16.6 G/DL (ref 12–16)
IRON SATN MFR SERPL: 27 % (ref 15–55)
IRON SERPL-MCNC: 119 UG/DL (ref 40–170)
MCH RBC QN AUTO: 32.7 PG (ref 27–33)
MCHC RBC AUTO-ENTMCNC: 33.5 G/DL (ref 33.6–35)
MCV RBC AUTO: 97.4 FL (ref 81.4–97.8)
PLATELET # BLD AUTO: 391 K/UL (ref 164–446)
PMV BLD AUTO: 11 FL (ref 9–12.9)
RBC # BLD AUTO: 5.08 M/UL (ref 4.2–5.4)
TIBC SERPL-MCNC: 437 UG/DL (ref 250–450)
VIT B12 SERPL-MCNC: 211 PG/ML (ref 211–911)
WBC # BLD AUTO: 10.1 K/UL (ref 4.8–10.8)

## 2017-10-24 PROCEDURE — 82746 ASSAY OF FOLIC ACID SERUM: CPT

## 2017-10-24 PROCEDURE — 36415 COLL VENOUS BLD VENIPUNCTURE: CPT | Performed by: PHYSICIAN ASSISTANT

## 2017-10-24 PROCEDURE — 99000 SPECIMEN HANDLING OFFICE-LAB: CPT | Performed by: PHYSICIAN ASSISTANT

## 2017-10-24 PROCEDURE — 83550 IRON BINDING TEST: CPT

## 2017-10-24 PROCEDURE — 83540 ASSAY OF IRON: CPT

## 2017-10-24 PROCEDURE — 82607 VITAMIN B-12: CPT

## 2017-10-24 PROCEDURE — 85027 COMPLETE CBC AUTOMATED: CPT

## 2017-10-30 ENCOUNTER — OFFICE VISIT (OUTPATIENT)
Dept: MEDICAL GROUP | Facility: CLINIC | Age: 62
End: 2017-10-30
Payer: MEDICAID

## 2017-10-30 ENCOUNTER — TELEPHONE (OUTPATIENT)
Dept: MEDICAL GROUP | Facility: CLINIC | Age: 62
End: 2017-10-30

## 2017-10-30 VITALS
HEIGHT: 64 IN | DIASTOLIC BLOOD PRESSURE: 74 MMHG | BODY MASS INDEX: 19.84 KG/M2 | HEART RATE: 76 BPM | OXYGEN SATURATION: 96 % | WEIGHT: 116.2 LBS | SYSTOLIC BLOOD PRESSURE: 138 MMHG | TEMPERATURE: 99 F | RESPIRATION RATE: 18 BRPM

## 2017-10-30 DIAGNOSIS — M16.11 OSTEOARTHRITIS OF RIGHT HIP, UNSPECIFIED OSTEOARTHRITIS TYPE: ICD-10-CM

## 2017-10-30 DIAGNOSIS — S61.411D LACERATION OF RIGHT HAND WITHOUT FOREIGN BODY, SUBSEQUENT ENCOUNTER: ICD-10-CM

## 2017-10-30 DIAGNOSIS — C50.012 MALIGNANT NEOPLASM OF NIPPLE OF LEFT BREAST IN FEMALE, UNSPECIFIED ESTROGEN RECEPTOR STATUS (HCC): ICD-10-CM

## 2017-10-30 PROCEDURE — 99214 OFFICE O/P EST MOD 30 MIN: CPT | Performed by: FAMILY MEDICINE

## 2017-10-30 RX ORDER — IBUPROFEN 800 MG/1
800 TABLET ORAL EVERY 8 HOURS PRN
COMMUNITY
End: 2018-01-13

## 2017-10-30 ASSESSMENT — ENCOUNTER SYMPTOMS
PSYCHIATRIC NEGATIVE: 1
SENSORY CHANGE: 1
GASTROINTESTINAL NEGATIVE: 1
CONSTIPATION: 0
DIZZINESS: 0
HEMOPTYSIS: 0
FOCAL WEAKNESS: 1
NECK PAIN: 0
CONSTITUTIONAL NEGATIVE: 1
HEADACHES: 0
MUSCULOSKELETAL NEGATIVE: 1
CHILLS: 0
CARDIOVASCULAR NEGATIVE: 1
COUGH: 0
RESPIRATORY NEGATIVE: 1
PALPITATIONS: 0
EYES NEGATIVE: 1
MYALGIAS: 0
FEVER: 0

## 2017-10-30 NOTE — PROGRESS NOTES
Subjective:      Dougie Baptiste is a 62 y.o. female who presents with Results (labs done 10/24)            1. Malignant neoplasm of nipple of left breast in female, unspecified estrogen receptor status (CMS-HCC)  Patient has had a ulcerative lesion on the left breast for many years and due to past substance abuse has been lost to f/u  We gave her a referral to onc and when talking to them had some issues with her insurance  We will see if she can see the  and get some help  - REFERRAL TO INTAKE ONCOLOGY COORDINATOR    2. Laceration of right hand without foreign body, subsequent encounter  Recovering well from lacs and function better    3. Osteoarthritis of right hip, unspecified osteoarthritis type  Improving with just nsaids    Past Medical History:  No date: ETOH abuse  No date: Teeth decayed  Past Surgical History:  6/18/2017: LACERATION REPAIR Right      Comment: Procedure: REPAIR OF MULTIPLE FLEXOR TENDONS                LACERATIONS; REPAIR OF ULNAR NERVE LACERATION;                Surgeon: Mert Madsen M.D.;  Location:                SURGERY [facility];  Service:   6/18/2017: IRRIGATION & DEBRIDEMENT GENERAL Right      Comment: Procedure: IRRIGATION & DEBRIDEMENT GENERAL;                 Surgeon: Mert Madsen M.D.;  Location:                SURGERY [facility];  Service:   6/18/2017: WOUND CLOSURE GENERAL Right      Comment: Procedure: WOUND CLOSURE GENERAL;  Surgeon:                Mert Madsen M.D.;  Location: SURGERY [facility];  Service:   Smoking status: Light Tobacco Smoker                                                       Packs/day: 0.00      Years: 0.00         Types: Cigars  Smokeless tobacco: Never Used                      Comment: 3 cigars a week   Alcohol use: Yes           4.2 oz/week     Shots of liquor: 7 per week    History reviewed.  No pertinent family history.      Current Outpatient Prescriptions: •  ibuprofen (MOTRIN) 800 MG Tab,  Take 800 mg by mouth every 8 hours as needed., Disp: , Rfl: •  meloxicam (MOBIC) 7.5 MG Tab, Take 1 Tab by mouth 2 times a day., Disp: 60 Tab, Rfl: 1•  ferrous sulfate 325 (65 Fe) MG EC tablet, Take 1 Tab by mouth 3 times a day, with meals., Disp: 90 Tab, Rfl: 1•  thiamine (THIAMINE) 100 MG tablet, Take 1 Tab by mouth every day., Disp: 30 Tab, Rfl: 3•  folic acid (FOLVITE) 1 MG Tab, Take 1 Tab by mouth every day., Disp: 30 Tab, Rfl: 3•  Aspirin-Caffeine (ANACIN PO), Take  by mouth., Disp: , Rfl: •  Saccharomyces boulardii (PROBIOTIC) 250 MG Cap, Take 1 Cap by mouth., Disp: 12 Cap, Rfl: 0    Patient was instructed on the use of medications, either prescriptions or OTC and informed on when the appropriate follow up time period should be. In addition, patient was also instructed that should any acute worsening occur that they should notify this clinic asap or call 911.            Review of Systems   Constitutional: Negative.  Negative for chills and fever.        Past Medical History:  No date: ETOH abuse  No date: Teeth decayed  Past Surgical History:  6/18/2017: LACERATION REPAIR Right      Comment: Procedure: REPAIR OF MULTIPLE FLEXOR TENDONS                LACERATIONS; REPAIR OF ULNAR NERVE LACERATION;                Surgeon: Mert Madsen M.D.;  Location:                [facility];  Service:   6/18/2017: IRRIGATION & DEBRIDEMENT GENERAL Right      Comment: Procedure: IRRIGATION & DEBRIDEMENT GENERAL;                 Surgeon: Mert Madsen M.D.;  Location:                SURGERY [facility];  Service:   6/18/2017: WOUND CLOSURE GENERAL Right      Comment: Procedure: WOUND CLOSURE GENERAL;  Surgeon:                Mert Madsen M.D.;  Location: SURGERY [facility];  Service:   Smoking status: Light Tobacco Smoker                                                       Packs/day: 0.00      Years: 0.00         Types: Cigars  Smokeless tobacco: Never Used                     "  Comment: 3 cigars a week   Alcohol use: Yes           4.2 oz/week     Shots of liquor: 7 per week    History reviewed.  No pertinent family history.     HENT: Negative.    Eyes: Negative.    Respiratory: Negative.  Negative for cough and hemoptysis.    Cardiovascular: Negative.  Negative for chest pain and palpitations.   Gastrointestinal: Negative.  Negative for constipation.   Genitourinary: Negative.  Negative for dysuria and urgency.   Musculoskeletal: Negative.  Negative for myalgias and neck pain.   Skin: Negative.  Negative for rash.   Neurological: Positive for sensory change and focal weakness. Negative for dizziness and headaches.   Endo/Heme/Allergies: Negative.    Psychiatric/Behavioral: Negative.  Negative for suicidal ideas.          Objective:     /74   Pulse 76   Temp 37.2 °C (99 °F)   Resp 18   Ht 1.632 m (5' 4.25\") Comment: pt reported  Wt 52.7 kg (116 lb 3.2 oz)   SpO2 96%   BMI 19.79 kg/m²      Physical Exam   Constitutional: She is oriented to person, place, and time. She appears well-developed and well-nourished. No distress.   HENT:   Head: Normocephalic and atraumatic.   Mouth/Throat: Oropharynx is clear and moist. No oropharyngeal exudate.   Eyes: Pupils are equal, round, and reactive to light.   Cardiovascular: Normal rate, regular rhythm, normal heart sounds and intact distal pulses.  Exam reveals no gallop and no friction rub.    No murmur heard.  Pulmonary/Chest: Effort normal and breath sounds normal. No respiratory distress. She has no wheezes. She has no rales. She exhibits no tenderness.   Ulcerative lesion of the left breast   Musculoskeletal:   Deficit in extension in the right middle and ring fingers due to lac on forearm   Neurological: She is alert and oriented to person, place, and time.   Skin: She is not diaphoretic.   Psychiatric: She has a normal mood and affect. Her behavior is normal. Judgment and thought content normal.   Nursing note and vitals " reviewed.              Assessment/Plan:     1. Malignant neoplasm of nipple of left breast in female, unspecified estrogen receptor status (CMS-HCC)    - REFERRAL TO INTAKE ONCOLOGY COORDINATOR    2. Laceration of right hand without foreign body, subsequent encounter      3. Osteoarthritis of right hip, unspecified osteoarthritis type

## 2017-10-30 NOTE — TELEPHONE ENCOUNTER
Patient called and asked if she could get a script for her Thiamin and 800 mg Ibuprofen. She has the liquid form of the probiotic and she was wondering if that would be okay. Please advise.

## 2017-11-02 ENCOUNTER — TELEPHONE (OUTPATIENT)
Dept: HEMATOLOGY ONCOLOGY | Facility: MEDICAL CENTER | Age: 62
End: 2017-11-02

## 2017-11-02 NOTE — TELEPHONE ENCOUNTER
1st attempt to contact the patient- Left voicemail for patient requesting a return call to schedule an IOC appointment with ROGER Owen.   Ref: Julio Gonzalez  Dx: Breast lesions

## 2017-11-14 ENCOUNTER — TELEPHONE (OUTPATIENT)
Dept: HEMATOLOGY ONCOLOGY | Facility: MEDICAL CENTER | Age: 62
End: 2017-11-14

## 2017-11-14 NOTE — TELEPHONE ENCOUNTER
Patient called stated that she has appointment on 12/4/17 with María LINDQUIST RN and she wanted to Cancelled her appointment due to her schedule and she will call us back to rescheduled her appointment

## 2017-11-20 ENCOUNTER — TELEPHONE (OUTPATIENT)
Dept: HEMATOLOGY ONCOLOGY | Facility: MEDICAL CENTER | Age: 62
End: 2017-11-20

## 2017-11-21 DIAGNOSIS — D64.9 ANEMIA, UNSPECIFIED TYPE: ICD-10-CM

## 2017-11-21 RX ORDER — LANOLIN ALCOHOL/MO/W.PET/CERES
325 CREAM (GRAM) TOPICAL
Qty: 90 TAB | Refills: 1 | Status: SHIPPED | OUTPATIENT
Start: 2017-11-21 | End: 2017-12-13 | Stop reason: SDUPTHER

## 2017-11-21 NOTE — TELEPHONE ENCOUNTER
Called and spoke to Nichol concerning her [facility] appointment to follow up on her breast lesion.  She stated that she is just to busy over the holidays, She does a lot of volunteering. She will call back to schedule after Christmas, put her on my deferred list to call back the first week in January.

## 2017-11-28 DIAGNOSIS — M16.11 PRIMARY OSTEOARTHRITIS OF RIGHT HIP: ICD-10-CM

## 2017-11-28 RX ORDER — MELOXICAM 7.5 MG/1
7.5 TABLET ORAL 2 TIMES DAILY
Qty: 60 TAB | Refills: 2 | Status: SHIPPED | OUTPATIENT
Start: 2017-11-28 | End: 2018-01-13

## 2017-11-28 NOTE — TELEPHONE ENCOUNTER
Was the patient seen in the last year in this department? Yes     Does patient have an active prescription for medications requested? No     Received Request Via: Pharmacy   Last seen  10/30/17  Last labs   10/24/17

## 2017-12-13 DIAGNOSIS — D64.9 ANEMIA, UNSPECIFIED TYPE: ICD-10-CM

## 2017-12-13 RX ORDER — FOLIC ACID 1 MG/1
1 TABLET ORAL DAILY
Qty: 90 TAB | Refills: 1 | Status: SHIPPED | OUTPATIENT
Start: 2017-12-13 | End: 2019-12-19

## 2017-12-13 RX ORDER — LANOLIN ALCOHOL/MO/W.PET/CERES
325 CREAM (GRAM) TOPICAL
Qty: 90 TAB | Refills: 1 | Status: SHIPPED | OUTPATIENT
Start: 2017-12-13 | End: 2018-11-13

## 2017-12-13 RX ORDER — LANOLIN ALCOHOL/MO/W.PET/CERES
100 CREAM (GRAM) TOPICAL DAILY
Qty: 90 TAB | Refills: 1 | Status: SHIPPED | OUTPATIENT
Start: 2017-12-13 | End: 2018-01-13

## 2017-12-28 RX ORDER — ASCORBIC ACID 500 MG
1000 TABLET ORAL DAILY
Qty: 30 TAB | Refills: 2 | Status: SHIPPED | OUTPATIENT
Start: 2017-12-28 | End: 2018-04-03 | Stop reason: SDUPTHER

## 2017-12-28 RX ORDER — PNV NO.95/FERROUS FUM/FOLIC AC 28MG-0.8MG
1 TABLET ORAL
COMMUNITY
End: 2018-01-13

## 2017-12-28 RX ORDER — MULTIVIT WITH MINERALS/LUTEIN
1 TABLET ORAL DAILY
Qty: 30 CAP | Refills: 2 | Status: SHIPPED | OUTPATIENT
Start: 2017-12-28 | End: 2018-01-13

## 2017-12-28 RX ORDER — ASCORBIC ACID 500 MG
1000 TABLET ORAL DAILY
COMMUNITY
End: 2017-12-28 | Stop reason: SDUPTHER

## 2017-12-28 RX ORDER — ASCORBIC ACID 500 MG
1 CAPSULE, EXTENDED RELEASE ORAL DAILY
Qty: 30 CAP | Refills: 2 | Status: SHIPPED | OUTPATIENT
Start: 2017-12-28 | End: 2018-01-13

## 2017-12-28 RX ORDER — MULTIVIT WITH MINERALS/LUTEIN
1 TABLET ORAL DAILY
COMMUNITY
End: 2017-12-28 | Stop reason: SDUPTHER

## 2017-12-28 RX ORDER — ERGOCALCIFEROL (VITAMIN D2) 10 MCG
1 TABLET ORAL
COMMUNITY
End: 2018-01-13

## 2017-12-28 RX ORDER — ASCORBIC ACID 500 MG
CAPSULE, EXTENDED RELEASE ORAL
COMMUNITY
End: 2017-12-28 | Stop reason: SDUPTHER

## 2017-12-28 NOTE — TELEPHONE ENCOUNTER
Was the patient seen in the last year in this department? Yes     Does patient have an active prescription for medications requested? No     Received Request Via: Patient   Last seen  10/3017  Last labs   10/24/17

## 2018-01-04 ENCOUNTER — TELEPHONE (OUTPATIENT)
Dept: HEMATOLOGY ONCOLOGY | Facility: MEDICAL CENTER | Age: 63
End: 2018-01-04

## 2018-01-04 NOTE — TELEPHONE ENCOUNTER
2nd attempt to contact the patient- Left voicemail for patient requesting a return call to schedule an IOC appointment with ROGER Owen. Ref: Julio Gonzalez  Dx: Breast lesions

## 2018-01-11 ENCOUNTER — TELEPHONE (OUTPATIENT)
Dept: MEDICAL GROUP | Facility: PHYSICIAN GROUP | Age: 63
End: 2018-01-11

## 2018-01-11 NOTE — TELEPHONE ENCOUNTER
"2. What are the patient's symptoms (location & severity)? Itchy scalp and back, feeling like there's \"an elephant on my chest\"    3. Is this a new symptom Yes    4. When did it start? 3-4 days ago    5. Action taken per Active Symptom Guide: pt already had an appt set up. per pt she felt chest pressure and then drank some hot coffee and felt better since this morning     6. Per pt request she would like ventolin or primatine inhalers because she does have trouble breathing. Would it be possible to get a prescription for her?   "

## 2018-01-13 ENCOUNTER — OFFICE VISIT (OUTPATIENT)
Dept: MEDICAL GROUP | Facility: CLINIC | Age: 63
End: 2018-01-13
Payer: MEDICAID

## 2018-01-13 VITALS
SYSTOLIC BLOOD PRESSURE: 106 MMHG | TEMPERATURE: 98.4 F | BODY MASS INDEX: 19.12 KG/M2 | OXYGEN SATURATION: 96 % | WEIGHT: 112 LBS | HEART RATE: 92 BPM | HEIGHT: 64 IN | DIASTOLIC BLOOD PRESSURE: 68 MMHG | RESPIRATION RATE: 18 BRPM

## 2018-01-13 DIAGNOSIS — R21 RASH: ICD-10-CM

## 2018-01-13 PROCEDURE — 99202 OFFICE O/P NEW SF 15 MIN: CPT | Performed by: PHYSICIAN ASSISTANT

## 2018-01-13 RX ORDER — CLOTRIMAZOLE AND BETAMETHASONE DIPROPIONATE 10; .64 MG/G; MG/G
5 CREAM TOPICAL 2 TIMES DAILY
Qty: 2 TUBE | Refills: 1 | Status: SHIPPED | OUTPATIENT
Start: 2018-01-13 | End: 2018-01-22 | Stop reason: SDUPTHER

## 2018-01-13 NOTE — PROGRESS NOTES
Urgent Care Note   This patients PCP is: Keith Gonzalez M.D.    Reason for visit:  Rash for 6 months     HPI:  Dougie Baptiste is a 62 y.o. old patient who is seeing us today in the [facility] system.  This is a pleasant patient and I appreciate the opportunity to participate in the care of this patient.  This is a new problem today    1. Rash  This patient has had a rash one the hands and neck for the last   Seeing oncology in Feb. For breast cancer.      6 months she fell into a cactus.  States was in the hospital for this.  Now has an itchy patch on the back of her scalp.  Using neosporin and vicks which does not help but makes it feel better.  She is picking and scratching at it.         ROS:   Constitutional: no fatigue,   HEENT: No Headache, No sore throat.  Ears:  No ear pain  Eyes:  No blurred vision, No goopy/crusted eyes  Cardiac: No racing heart rate  Resp: No shortness of breath,  No cough, No wheezing.  Gastro: No nausea or vomiting, No diarrhea.    All other systems were reviewed and were negative.      Past Medical History:  Patient Active Problem List    Diagnosis Date Noted   • Financial difficulties 06/22/2017     Priority: High   • Hand laceration 06/18/2017     Priority: High   • Osteoarthritis of right hip 06/20/2017     Priority: Medium   • Breast cancer (CMS-[facility]) 06/18/2017     Priority: Low   • Rash 01/13/2018   • History of ETOH abuse 08/21/2017   • History of methamphetamine abuse 08/21/2017       Past Surgical History:  Past Surgical History:   Procedure Laterality Date   • LACERATION REPAIR Right 6/18/2017    Procedure: REPAIR OF MULTIPLE FLEXOR TENDONS LACERATIONS; REPAIR OF ULNAR NERVE LACERATION;  Surgeon: Mert Madsen M.D.;  Location: SURGERY [facility];  Service:    • IRRIGATION & DEBRIDEMENT GENERAL Right 6/18/2017    Procedure: IRRIGATION & DEBRIDEMENT GENERAL;  Surgeon: Mert Madsen M.D.;  Location: SURGERY [facility];  Service:    • WOUND CLOSURE GENERAL  Right 6/18/2017    Procedure: WOUND CLOSURE GENERAL;  Surgeon: Mert Madsen M.D.;  Location: SURGERY [facility];  Service:        Allergies:  Patient has no known allergies.    Social History:  Social History     Social History   • Marital status: Single     Spouse name: N/A   • Number of children: N/A   • Years of education: N/A     Occupational History   • Not on file.     Social History Main Topics   • Smoking status: Light Tobacco Smoker     Types: Cigars   • Smokeless tobacco: Never Used      Comment: 3 cigars a day   • Alcohol use Yes      Comment: half pint/week   • Drug use: No   • Sexual activity: Not Currently     Other Topics Concern   • Not on file     Social History Narrative   • No narrative on file       Family History:  History reviewed. No pertinent family history.    Medications:    Current Outpatient Prescriptions:   •  ascorbic acid (ASCORBIC ACID) 500 MG Tab, Take 2 Tabs by mouth every day., Disp: 30 Tab, Rfl: 2  •  ferrous sulfate 325 (65 Fe) MG EC tablet, Take 1 Tab by mouth 3 times a day, with meals., Disp: 90 Tab, Rfl: 1  •  folic acid (FOLVITE) 1 MG Tab, Take 1 Tab by mouth every day., Disp: 90 Tab, Rfl: 1  •  Aspirin-Caffeine (ANACIN PO), Take  by mouth., Disp: , Rfl:   •  Prenatal Vit-Fe Fumarate-FA (PRENATAL MULTIVITAMIN/IRON PO), Take 1 Tab by mouth., Disp: , Rfl:   •  Prenatal Multivit-Min-Fe-FA (PRENATAL/IRON) Tab, Take 1 Tab by mouth., Disp: , Rfl:   •  Prenatal Vit-Fe Fumarate-FA (PRENATAL ONE DAILY) 27-0.8 MG Tab, Take 1 Tube by mouth., Disp: , Rfl:   •  coenzyme Q-10 30 MG capsule, Take 1 Cap by mouth every day., Disp: 30 Cap, Rfl: 2  •  Soya Lecithin 1200 MG Cap, Take 1 Cap by mouth every day., Disp: 30 Cap, Rfl: 2  •  vitamin E (VITAMIN E) 1000 UNIT Cap, Take 1 Cap by mouth every day., Disp: 30 Cap, Rfl: 2  •  thiamine (THIAMINE) 100 MG tablet, Take 1 Tab by mouth every day., Disp: 90 Tab, Rfl: 1  •  meloxicam (MOBIC) 7.5 MG Tab, Take 1 Tab by mouth 2 times a day., Disp:  "60 Tab, Rfl: 2  •  ibuprofen (MOTRIN) 800 MG Tab, Take 800 mg by mouth every 8 hours as needed., Disp: , Rfl:   •  Saccharomyces boulardii (PROBIOTIC) 250 MG Cap, Take 1 Cap by mouth., Disp: 12 Cap, Rfl: 0    Physical Examination:   Vital signs: /68   Pulse 92   Temp 36.9 °C (98.4 °F)   Resp 18   Ht 1.626 m (5' 4\")   Wt 50.8 kg (112 lb)   SpO2 96%   BMI 19.22 kg/m²   General: No distress, cooperative, well dressed and well nourished.     Resp: Normal effort, Bilateral clear to auscultation, No wheezing,   CVS: Regular rate and rhythm,  No murmur.   Neuro: Alert and oriented  Skin: severe deep red scaling flaking plaques on the back of the head and neck.  Scabbed in areas from scratching.  Not raised.  Not indurated   Psych: Normal mood and affect      Assessment and Plan:    1. Rash  Betamethasone cream given twice a day and must f/u with PCP for this  May consider oral antibiotic    The total time spent seeing this patient today face to face in consultation, and formulating an action plan for this visit was greater than 25 minutes. > Than 50% of this time was spent counseling, discussing problems documented above and below, coordinating care and answering questions by the physician assistant.  We developed a diabetes care plan for this patient today.       Follow-up with your PCP in 3-7 days if you are not getting better.  Return to  or the ER if symptoms become worse.  Patient understands these discharge instructions.      Jignesh Chinchilla P.A.-C.  01/13/18    CC:   Keith Gonzalez M.D.      "

## 2018-01-16 ENCOUNTER — TELEPHONE (OUTPATIENT)
Dept: MEDICAL GROUP | Facility: PHYSICIAN GROUP | Age: 63
End: 2018-01-16

## 2018-01-16 DIAGNOSIS — Z12.11 SCREEN FOR COLON CANCER: ICD-10-CM

## 2018-01-16 NOTE — TELEPHONE ENCOUNTER
Pt called stating that she went to urgent care on Saturday and that she had noticed that night some bumps on her left thumb and palm and now on her chest. She did note some itchiness in both areas. Pt states that she has not gone to  the medication she was prescribed yet. Advised pt to go  the medication and if it does not get better to go back to urgent care. Pt understood.

## 2018-01-20 ENCOUNTER — TELEPHONE (OUTPATIENT)
Dept: MEDICAL GROUP | Facility: CLINIC | Age: 63
End: 2018-01-20

## 2018-01-20 NOTE — TELEPHONE ENCOUNTER
Phone Number Called: [phone] (home)     Message: Pt. Called stating rash is now spreading to her elbows and she is getting pimple like spots. She wanted to let you know that the medications you gave her are working but rash is spreading.     Left Message for patient to call back: no

## 2018-01-22 ENCOUNTER — OFFICE VISIT (OUTPATIENT)
Dept: MEDICAL GROUP | Facility: CLINIC | Age: 63
End: 2018-01-22
Payer: MEDICAID

## 2018-01-22 VITALS
HEART RATE: 102 BPM | DIASTOLIC BLOOD PRESSURE: 92 MMHG | TEMPERATURE: 99.2 F | HEIGHT: 63 IN | SYSTOLIC BLOOD PRESSURE: 138 MMHG | OXYGEN SATURATION: 96 % | WEIGHT: 109 LBS | BODY MASS INDEX: 19.31 KG/M2

## 2018-01-22 DIAGNOSIS — Z72.0 TOBACCO ABUSE: ICD-10-CM

## 2018-01-22 DIAGNOSIS — R21 RASH: ICD-10-CM

## 2018-01-22 DIAGNOSIS — C50.012 MALIGNANT NEOPLASM OF NIPPLE OF LEFT BREAST IN FEMALE, UNSPECIFIED ESTROGEN RECEPTOR STATUS (HCC): ICD-10-CM

## 2018-01-22 PROCEDURE — 99214 OFFICE O/P EST MOD 30 MIN: CPT | Performed by: FAMILY MEDICINE

## 2018-01-22 RX ORDER — CLOTRIMAZOLE AND BETAMETHASONE DIPROPIONATE 10; .64 MG/G; MG/G
5 CREAM TOPICAL 2 TIMES DAILY
Qty: 2 TUBE | Refills: 4 | Status: SHIPPED | OUTPATIENT
Start: 2018-01-22 | End: 2018-11-13

## 2018-01-22 RX ORDER — MELOXICAM 7.5 MG/5ML
SUSPENSION ORAL 2 TIMES DAILY
COMMUNITY
End: 2018-11-13

## 2018-01-22 RX ORDER — METHYLPREDNISOLONE 4 MG/1
TABLET ORAL
Qty: 1 KIT | Refills: 0 | Status: SHIPPED | OUTPATIENT
Start: 2018-01-22 | End: 2018-11-13

## 2018-01-22 ASSESSMENT — ENCOUNTER SYMPTOMS
EYES NEGATIVE: 1
RESPIRATORY NEGATIVE: 1
HEMOPTYSIS: 0
GASTROINTESTINAL NEGATIVE: 1
FEVER: 0
MUSCULOSKELETAL NEGATIVE: 1
DIZZINESS: 0
PSYCHIATRIC NEGATIVE: 1
NEUROLOGICAL NEGATIVE: 1
CONSTIPATION: 0
NECK PAIN: 0
HEADACHES: 0
CARDIOVASCULAR NEGATIVE: 1
CONSTITUTIONAL NEGATIVE: 1
COUGH: 0
MYALGIAS: 0
PALPITATIONS: 0
CHILLS: 0

## 2018-01-22 NOTE — PROGRESS NOTES
Subjective:      Dougie Baptiste is a 62 y.o. female who presents with Rash (x1 week, spreading across whole body - itchy)            1. Malignant neoplasm of nipple of left breast in female, unspecified estrogen receptor status (CMS-HCC)  Has appt with onc intake on 2/5  Patient promises to go to this, has broken some appt.s recently     2. Rash  Itchy on body , better with steroid cream  - MethylPREDNISolone (MEDROL DOSEPAK) 4 MG Tablet Therapy Pack; As dir  Dispense: 1 Kit; Refill: 0  - REFERRAL TO DERMATOLOGY  - clotrimazole-betamethasone (LOTRISONE) 1-0.05 % Cream; Apply 5 oz to affected area(s) 2 times a day. Will apply to the back of the neck twice a day.  Dispense: 2 Tube; Refill: 4    3. Tobacco abuse  Patient is still currently using tobacco. They were counseled on the importance of cessation and various behavioural and pharmacological ways of achieving this.  UNCONTROLLED      Past Medical History:  No date: ETOH abuse  No date: Teeth decayed  Past Surgical History:  6/18/2017: LACERATION REPAIR Right      Comment: Procedure: REPAIR OF MULTIPLE FLEXOR TENDONS                LACERATIONS; REPAIR OF ULNAR NERVE LACERATION;                Surgeon: Mert Madsen M.D.;  Location:                SURGERY [facility];  Service:   6/18/2017: IRRIGATION & DEBRIDEMENT GENERAL Right      Comment: Procedure: IRRIGATION & DEBRIDEMENT GENERAL;                 Surgeon: Mert Madsen M.D.;  Location:                SURGERY [facility];  Service:   6/18/2017: WOUND CLOSURE GENERAL Right      Comment: Procedure: WOUND CLOSURE GENERAL;  Surgeon:                Mert Madsen M.D.;  Location: SURGERY [facility];  Service:   Smoking status: Current Every Day Smoker                                                   Packs/day: 0.25      Years: 7.00         Types: Cigars  Smokeless tobacco: Never Used                      Comment: 3 cigars a day  Alcohol use: Yes              Comment: half  pint/week    History reviewed.  No pertinent family history.      Current Outpatient Prescriptions: •  Meloxicam 7.5 MG/5ML Suspension, Take  by mouth 2 Times a Day., Disp: , Rfl: •  MethylPREDNISolone (MEDROL DOSEPAK) 4 MG Tablet Therapy Pack, As dir, Disp: 1 Kit, Rfl: 0•  clotrimazole-betamethasone (LOTRISONE) 1-0.05 % Cream, Apply 5 oz to affected area(s) 2 times a day. Will apply to the back of the neck twice a day., Disp: 2 Tube, Rfl: 4•  ascorbic acid (ASCORBIC ACID) 500 MG Tab, Take 2 Tabs by mouth every day., Disp: 30 Tab, Rfl: 2•  ferrous sulfate 325 (65 Fe) MG EC tablet, Take 1 Tab by mouth 3 times a day, with meals., Disp: 90 Tab, Rfl: 1•  folic acid (FOLVITE) 1 MG Tab, Take 1 Tab by mouth every day., Disp: 90 Tab, Rfl: 1•  Aspirin-Caffeine (ANACIN PO), Take  by mouth., Disp: , Rfl:     Patient was instructed on the use of medications, either prescriptions or OTC and informed on when the appropriate follow up time period should be. In addition, patient was also instructed that should any acute worsening occur that they should notify this clinic asap or call 911.            Review of Systems   Constitutional: Negative.  Negative for chills and fever.        Past Medical History:  No date: ETOH abuse  No date: Teeth decayed  Past Surgical History:  6/18/2017: LACERATION REPAIR Right      Comment: Procedure: REPAIR OF MULTIPLE FLEXOR TENDONS                LACERATIONS; REPAIR OF ULNAR NERVE LACERATION;                Surgeon: Mert Madsen M.D.;  Location:                [facility];  Service:   6/18/2017: IRRIGATION & DEBRIDEMENT GENERAL Right      Comment: Procedure: IRRIGATION & DEBRIDEMENT GENERAL;                 Surgeon: Mert Madsen M.D.;  Location:                [facility];  Service:   6/18/2017: WOUND CLOSURE GENERAL Right      Comment: Procedure: WOUND CLOSURE GENERAL;  Surgeon:                Mert Madsen M.D.;  Location: [facility];   "Service:   Smoking status: Current Every Day Smoker                                                   Packs/day: 0.25      Years: 7.00         Types: Cigars  Smokeless tobacco: Never Used                      Comment: 3 cigars a day  Alcohol use: Yes              Comment: half pint/week    History reviewed.  No pertinent family history.     HENT: Negative.    Eyes: Negative.    Respiratory: Negative.  Negative for cough and hemoptysis.    Cardiovascular: Negative.  Negative for chest pain and palpitations.   Gastrointestinal: Negative.  Negative for constipation.   Genitourinary: Negative.  Negative for dysuria and urgency.   Musculoskeletal: Negative.  Negative for myalgias and neck pain.   Skin: Positive for itching and rash.   Neurological: Negative.  Negative for dizziness and headaches.   Endo/Heme/Allergies: Negative.    Psychiatric/Behavioral: Negative.  Negative for suicidal ideas.          Objective:     /92   Pulse (!) 102   Temp 37.3 °C (99.2 °F)   Ht 1.6 m (5' 3\")   Wt 49.4 kg (109 lb)   SpO2 96%   BMI 19.31 kg/m²      Physical Exam   Constitutional: She is oriented to person, place, and time. She appears well-developed and well-nourished. No distress.   HENT:   Head: Normocephalic and atraumatic.   Right Ear: External ear normal.   Left Ear: External ear normal.   Nose: Nose normal.   Mouth/Throat: Oropharynx is clear and moist. No oropharyngeal exudate.   Eyes: Pupils are equal, round, and reactive to light. Right eye exhibits no discharge. Left eye exhibits no discharge. No scleral icterus.   Neck: Normal range of motion. Neck supple. No JVD present. No tracheal deviation present. No thyromegaly present.   Cardiovascular: Normal rate, regular rhythm, normal heart sounds and intact distal pulses.  Exam reveals no gallop and no friction rub.    No murmur heard.  Pulmonary/Chest: Effort normal and breath sounds normal. No stridor. No respiratory distress. She has no wheezes. She has no rales. " She exhibits no tenderness. Left breast exhibits mass, skin change and tenderness.       Necrotic lesion on left breast with loss of nipple   Abdominal: Soft. She exhibits no distension. There is no tenderness.   Lymphadenopathy:     She has no cervical adenopathy.   Neurological: She is alert and oriented to person, place, and time. No cranial nerve deficit.   Skin: She is not diaphoretic.   Psychiatric: She has a normal mood and affect. Her behavior is normal. Judgment and thought content normal.   Nursing note and vitals reviewed.              Assessment/Plan:     1. Malignant neoplasm of nipple of left breast in female, unspecified estrogen receptor status (CMS-HCC)      2. Rash    - MethylPREDNISolone (MEDROL DOSEPAK) 4 MG Tablet Therapy Pack; As dir  Dispense: 1 Kit; Refill: 0  - REFERRAL TO DERMATOLOGY  - clotrimazole-betamethasone (LOTRISONE) 1-0.05 % Cream; Apply 5 oz to affected area(s) 2 times a day. Will apply to the back of the neck twice a day.  Dispense: 2 Tube; Refill: 4    3. Tobacco abuse

## 2018-01-23 ENCOUNTER — TELEPHONE (OUTPATIENT)
Dept: MEDICAL GROUP | Facility: PHYSICIAN GROUP | Age: 63
End: 2018-01-23

## 2018-01-23 ENCOUNTER — TELEPHONE (OUTPATIENT)
Dept: MEDICAL GROUP | Facility: CLINIC | Age: 63
End: 2018-01-23

## 2018-01-23 NOTE — TELEPHONE ENCOUNTER
1. Caller Name: pt.                                         Call Back Number: [phone] (home)       Patient approves a detailed voicemail message: yes    2. SPECIFIC Action To Be Taken: Patient would like referral to get Colonoscopy.     3. Diagnosis/Clinical Reason for Request: Colonoscopy due- Screening    4. Specialty & Provider Name/Lab/Imaging Location: Per pt. Any where is okay.    5. Is appointment scheduled for requested order/referral: no    Patient informed they will receive a return phone call from the office ONLY if there are any questions before processing their request. Advised to call back if they haven't received a call from the referral department in 5 days.      Patient also stated medication you gave her is working great!

## 2018-01-23 NOTE — TELEPHONE ENCOUNTER
Keith Gonzalez M.D.   You Yesterday (10:23 AM)      She needs an appt to come in and be seen (Routing comment)      Pt. Has apt. Yesterday about rash.

## 2018-02-05 ENCOUNTER — APPOINTMENT (OUTPATIENT)
Dept: HEMATOLOGY ONCOLOGY | Facility: MEDICAL CENTER | Age: 63
End: 2018-02-05
Payer: MEDICAID

## 2018-02-05 ENCOUNTER — TELEPHONE (OUTPATIENT)
Dept: HEMATOLOGY ONCOLOGY | Facility: MEDICAL CENTER | Age: 63
End: 2018-02-05

## 2018-02-07 ENCOUNTER — OFFICE VISIT (OUTPATIENT)
Dept: HEMATOLOGY ONCOLOGY | Facility: MEDICAL CENTER | Age: 63
End: 2018-02-07
Payer: MEDICAID

## 2018-02-07 ENCOUNTER — TELEPHONE (OUTPATIENT)
Dept: MEDICAL GROUP | Facility: PHYSICIAN GROUP | Age: 63
End: 2018-02-07

## 2018-02-07 VITALS
WEIGHT: 113.43 LBS | TEMPERATURE: 96.6 F | SYSTOLIC BLOOD PRESSURE: 122 MMHG | RESPIRATION RATE: 18 BRPM | HEIGHT: 63 IN | DIASTOLIC BLOOD PRESSURE: 82 MMHG | HEART RATE: 81 BPM | BODY MASS INDEX: 20.1 KG/M2 | OXYGEN SATURATION: 97 %

## 2018-02-07 DIAGNOSIS — N64.59 ABNORMAL BREAST EXAM: ICD-10-CM

## 2018-02-07 DIAGNOSIS — R21 RASH: ICD-10-CM

## 2018-02-07 PROCEDURE — 99245 OFF/OP CONSLTJ NEW/EST HI 55: CPT | Performed by: NURSE PRACTITIONER

## 2018-02-07 ASSESSMENT — ENCOUNTER SYMPTOMS
CONSTIPATION: 0
COUGH: 1
SPUTUM PRODUCTION: 1
VOMITING: 0
FEVER: 0
PALPITATIONS: 1
DEPRESSION: 0
DIARRHEA: 0
DIZZINESS: 1
CHILLS: 1
NAUSEA: 0
SHORTNESS OF BREATH: 1
DIAPHORESIS: 1
HEADACHES: 0
NERVOUS/ANXIOUS: 0
WEIGHT LOSS: 0

## 2018-02-07 ASSESSMENT — PAIN SCALES - GENERAL: PAINLEVEL: 6=MODERATE PAIN
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
## 2018-02-08 RX ORDER — METHYLPREDNISOLONE 4 MG/1
TABLET ORAL
Qty: 1 KIT | Refills: 0 | Status: CANCELLED | OUTPATIENT
Start: 2018-02-08

## 2018-02-08 NOTE — TELEPHONE ENCOUNTER
Pt states she got done with oncology and has hives. She is wanting to know if she can get the prednisone refilled, 1 week worth because she states that helped with the issue before.     Pt is aware Dr. Gonzalez is out of office, and might respond on 02/08/18

## 2018-02-15 ENCOUNTER — TELEPHONE (OUTPATIENT)
Dept: HEMATOLOGY ONCOLOGY | Facility: MEDICAL CENTER | Age: 63
End: 2018-02-15

## 2018-02-15 NOTE — TELEPHONE ENCOUNTER
I left message for patient to return call regarding scheduling with Dr. Russell at [facility]. Please ask patient when she is scheduled when patient returns call, if she is not scheduled, please give patient the number to contact Dr. Russell office.

## 2018-02-22 NOTE — TELEPHONE ENCOUNTER
Patient stated she is too busy at this time. Patient stated she will call Dr. Russell when she is less busy. 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 Patient declined letting me call Dr. Russell office to get her scheduled. Patient stated she will call when she is less busy.

## 2018-03-22 ENCOUNTER — TELEPHONE (OUTPATIENT)
Dept: MEDICAL GROUP | Facility: PHYSICIAN GROUP | Age: 63
End: 2018-03-22

## 2018-03-22 NOTE — TELEPHONE ENCOUNTER
Pt called to give an update. She states that she is only taking her vitamins and minerals. She did say that she occasionally takes meloxicam but only when she needs it, and is currently not taking any other medications. She also stated that she will need to go in for a biopsy but she has a lot going on and is taking care of her property and will call them when she has time.

## 2018-03-26 ENCOUNTER — TELEPHONE (OUTPATIENT)
Dept: HEMATOLOGY ONCOLOGY | Facility: MEDICAL CENTER | Age: 63
End: 2018-03-26

## 2018-03-26 NOTE — TELEPHONE ENCOUNTER
Patient returned call and stated she is ready to schedule with Dr. Russell. I left a message for Alona the  for Dr. Russell office to return my call to get the patient scheduled.

## 2018-03-26 NOTE — TELEPHONE ENCOUNTER
I left Dougie a message to call back regarding her referral to Dr. Russell and if she is ready to schedule that yet.

## 2018-03-27 NOTE — TELEPHONE ENCOUNTER
Alona from Dr. Russell office called this morning and she will call the patient to get scheduled as she has other questions for the patient prior to scheduling.

## 2018-03-28 NOTE — TELEPHONE ENCOUNTER
I spoke with the  at [facility], she stated they have left messages for the patient and sent a letter for her to call back to schedule.

## 2018-04-03 RX ORDER — ASCORBIC ACID 500 MG
1000 TABLET ORAL DAILY
Qty: 60 TAB | Refills: 2 | Status: SHIPPED | OUTPATIENT
Start: 2018-04-03 | End: 2018-06-21 | Stop reason: SDUPTHER

## 2018-04-03 NOTE — TELEPHONE ENCOUNTER
Was the patient seen in the last year in this department? Yes     Does patient have an active prescription for medications requested? Yes     Received Request Via: Pharmacy     Last visit 01/22/2018  Last labs 10/24/2017

## 2018-04-09 DIAGNOSIS — M16.11 PRIMARY OSTEOARTHRITIS OF RIGHT HIP: ICD-10-CM

## 2018-04-09 RX ORDER — MELOXICAM 7.5 MG/1
7.5 TABLET ORAL 2 TIMES DAILY
Qty: 180 TAB | Refills: 0 | Status: SHIPPED | OUTPATIENT
Start: 2018-04-09 | End: 2018-05-11 | Stop reason: SDUPTHER

## 2018-04-10 NOTE — TELEPHONE ENCOUNTER
I left message for Dougie to call [facility] to get scheduled with Dr. Russell. I left her the direct number to call [facility].     I spoke with [facility] on Friday 4/6/18 and they cannot reach the patient to schedule.

## 2018-04-18 NOTE — TELEPHONE ENCOUNTER
I left a message for patient to return our call regarding getting scheduled with Dr. Russell. I asked the patient to call our office directly or call Dr. Russell directly at [phone] to get scheduled with his office.

## 2018-04-26 NOTE — TELEPHONE ENCOUNTER
I spoke with [facility], they have reached out to the patient on 2/8, 3/2, and 3/22. They have left 2 messages and sent a letter.     I left a message for the patient regarding scheduling with Dr. Russell. I let her know they have been trying to contact her. I left her with the direct number to contact Dr. Russell office to schedule.

## 2018-05-11 DIAGNOSIS — M16.11 PRIMARY OSTEOARTHRITIS OF RIGHT HIP: ICD-10-CM

## 2018-05-12 NOTE — TELEPHONE ENCOUNTER
Was the patient seen in the last year in this department? Yes     Does patient have an active prescription for medications requested? Yes     Received Request Via: Pharmacy       Last Visit: 1/22/18  Last Labs: 10/24/17

## 2018-05-14 RX ORDER — MELOXICAM 7.5 MG/1
TABLET ORAL
Qty: 60 TAB | Refills: 2 | Status: SHIPPED | OUTPATIENT
Start: 2018-05-14 | End: 2018-09-21 | Stop reason: SDUPTHER

## 2018-05-14 RX ORDER — MELOXICAM 7.5 MG/1
TABLET ORAL
Qty: 180 TAB | Refills: 0 | Status: SHIPPED | OUTPATIENT
Start: 2018-05-14 | End: 2018-11-13

## 2018-05-14 RX ORDER — MULTIVIT WITH MINERALS/LUTEIN
TABLET ORAL
Qty: 60 CAP | Refills: 3 | Status: SHIPPED | OUTPATIENT
Start: 2018-05-14 | End: 2019-12-19

## 2018-06-08 NOTE — TELEPHONE ENCOUNTER
I left patient a message to follow up on if she was going to schedule with Dr. Russell at [facility].

## 2018-06-20 ENCOUNTER — TELEPHONE (OUTPATIENT)
Dept: HEMATOLOGY ONCOLOGY | Facility: MEDICAL CENTER | Age: 63
End: 2018-06-20

## 2018-06-20 NOTE — TELEPHONE ENCOUNTER
Patient left a message at [facility] 1815 on Tuesday night (6/19/2018) stating she has not heard from [facility] regarding her referral to Dr. Russell. She also stated her breast is causing discomfort and she is bleeding from her armpit. Baldo Vega Medical Assistant will return her call.

## 2018-06-20 NOTE — TELEPHONE ENCOUNTER
I left patient a message on her mobile and home number to return my call. I also left her with the direct number to call [facility] as they have been trying to reach her to schedule with Dr. Russell.

## 2018-06-21 ENCOUNTER — TELEPHONE (OUTPATIENT)
Dept: HEMATOLOGY ONCOLOGY | Facility: MEDICAL CENTER | Age: 63
End: 2018-06-21

## 2018-06-25 RX ORDER — ASCORBIC ACID 500 MG
1000 TABLET ORAL DAILY
Qty: 60 TAB | Refills: 2 | Status: SHIPPED | OUTPATIENT
Start: 2018-06-25 | End: 2018-09-21 | Stop reason: SDUPTHER

## 2018-06-27 NOTE — TELEPHONE ENCOUNTER
Patient returned our call, leaving a message that per Dr. Russell office she needed to have Y-90 mapping done before Dr. Russell will see her. Patient stated in her message that she would like to have as little radiation as possible.

## 2018-06-27 NOTE — TELEPHONE ENCOUNTER
I spoke to patient letting her know that we are waiting to speak with Dr. Russell and will call her when we have spoken to them.

## 2018-06-27 NOTE — TELEPHONE ENCOUNTER
I left a message for patient to return my call regarding her call from last week to see if she has contacted Dr. Russell office. I gave patient the number to his office and asked her to call us back to update.

## 2018-06-27 NOTE — TELEPHONE ENCOUNTER
I called Alona at Dr. Russell office, she stated the patient told her she has not had a sonagram since 2011. I gave María Alsop this information and she spoke to Alona and asked to have Dr. Russell return her call.

## 2018-07-02 NOTE — TELEPHONE ENCOUNTER
I tried to reach patient to update her that we are trying to get her in with a new surgeon as Dr. Russell will be out of office for 3 weeks. I called Dr. Ingram's office but she is signed out until tomorrow 7/3/18.

## 2018-07-10 ENCOUNTER — TELEPHONE (OUTPATIENT)
Dept: HEMATOLOGY ONCOLOGY | Facility: MEDICAL CENTER | Age: 63
End: 2018-07-10

## 2018-07-10 NOTE — TELEPHONE ENCOUNTER
Patient called regarding an appointment for tomorrow with our office, I explained to patient that Baldo Vega, Medical Assistant tried called her on 6/20/2018 and she actually has an upcoming appointment with Dr. Ingram tomorrow 7/11/2018. Patient confirmed appointment and will be calling their office to confirm with them at [phone]. Also I cancelled her upcoming appointment with our office on 7/17/2018 due to ROGER Owen not needing to see her for a visit.

## 2018-07-11 ENCOUNTER — HOSPITAL ENCOUNTER (OUTPATIENT)
Facility: MEDICAL CENTER | Age: 63
End: 2018-07-11
Attending: SURGERY
Payer: MEDICAID

## 2018-07-11 PROCEDURE — 88305 TISSUE EXAM BY PATHOLOGIST: CPT

## 2018-07-11 PROCEDURE — 88360 TUMOR IMMUNOHISTOCHEM/MANUAL: CPT

## 2018-07-31 ENCOUNTER — TELEPHONE (OUTPATIENT)
Dept: HEMATOLOGY ONCOLOGY | Facility: MEDICAL CENTER | Age: 63
End: 2018-07-31

## 2018-07-31 NOTE — TELEPHONE ENCOUNTER
1st attempt to contact the patient- Left voicemail for patient requesting a return call to schedule New Oncology appointment. NP/ Medicaid/ Breast Cancer/ Keeley Ingram

## 2018-08-10 ENCOUNTER — PATIENT OUTREACH (OUTPATIENT)
Dept: OTHER | Facility: MEDICAL CENTER | Age: 63
End: 2018-08-10

## 2018-08-10 ENCOUNTER — TELEPHONE (OUTPATIENT)
Dept: MEDICAL GROUP | Facility: PHYSICIAN GROUP | Age: 63
End: 2018-08-10

## 2018-08-21 ENCOUNTER — PATIENT OUTREACH (OUTPATIENT)
Dept: OTHER | Facility: MEDICAL CENTER | Age: 63
End: 2018-08-21

## 2018-08-23 ENCOUNTER — OFFICE VISIT (OUTPATIENT)
Dept: HEMATOLOGY ONCOLOGY | Facility: MEDICAL CENTER | Age: 63
End: 2018-08-23
Payer: MEDICAID

## 2018-08-23 VITALS
BODY MASS INDEX: 19.9 KG/M2 | DIASTOLIC BLOOD PRESSURE: 94 MMHG | TEMPERATURE: 98.6 F | HEIGHT: 63 IN | HEART RATE: 83 BPM | SYSTOLIC BLOOD PRESSURE: 158 MMHG | WEIGHT: 112.32 LBS | OXYGEN SATURATION: 97 % | RESPIRATION RATE: 16 BRPM

## 2018-08-23 DIAGNOSIS — Z17.0 MALIGNANT NEOPLASM OF NIPPLE OF LEFT BREAST IN FEMALE, ESTROGEN RECEPTOR POSITIVE (HCC): ICD-10-CM

## 2018-08-23 DIAGNOSIS — C50.012 MALIGNANT NEOPLASM OF NIPPLE OF LEFT BREAST IN FEMALE, ESTROGEN RECEPTOR POSITIVE (HCC): ICD-10-CM

## 2018-08-23 PROCEDURE — 99214 OFFICE O/P EST MOD 30 MIN: CPT | Performed by: INTERNAL MEDICINE

## 2018-08-23 RX ORDER — MULTIVITAMIN,THER AND MINERALS
TABLET ORAL
Refills: 99 | COMMUNITY
Start: 2018-06-26 | End: 2018-11-13

## 2018-08-23 RX ORDER — ANASTROZOLE 1 MG/1
1 TABLET ORAL DAILY
Qty: 30 TAB | Refills: 3 | Status: SHIPPED | OUTPATIENT
Start: 2018-08-23 | End: 2018-10-08 | Stop reason: SDUPTHER

## 2018-08-23 ASSESSMENT — PAIN SCALES - GENERAL: PAINLEVEL: 6=MODERATE PAIN

## 2018-08-23 NOTE — PROGRESS NOTES
Consult Note: Oncology    Date of consultation: 8/23/2018 9:06 AM    Referring provider: Dr Ingram   Reason for consultation: Neglected breast cancer      History of presenting illness:   -Presented with left breast mass in 2010.  -She never sought any medical care for this with the understanding that she may have cancer.  -Her left breast is pretty much destroyed by the breast cancer by now.  -She was a no-show for multiple prior appointments.  -Had a biopsy done at Dr. Ingram's office  -Grade 2 invasive ductal carcinoma, ER strongly positive, PR10-20% , Ki-67 95%, HER-2/lexis negative  -She is here to establish care.. She reports some discomfort involving the left breast. She has palpable axillary nodes.  -Does not have much family support and has some social issues, seeking care      Past Medical History:    Past Medical History:   Diagnosis Date   • ETOH abuse    • Teeth decayed        Past surgical history:    Past Surgical History:   Procedure Laterality Date   • LACERATION REPAIR Right 6/18/2017    Procedure: REPAIR OF MULTIPLE FLEXOR TENDONS LACERATIONS; REPAIR OF ULNAR NERVE LACERATION;  Surgeon: Mert Madsen M.D.;  Location: SURGERY [facility];  Service:    • IRRIGATION & DEBRIDEMENT GENERAL Right 6/18/2017    Procedure: IRRIGATION & DEBRIDEMENT GENERAL;  Surgeon: Mert Madsen M.D.;  Location: SURGERY [facility];  Service:    • WOUND CLOSURE GENERAL Right 6/18/2017    Procedure: WOUND CLOSURE GENERAL;  Surgeon: Mert Madsen M.D.;  Location: [facility];  Service:        Allergies:  Versed    Medications:    Current Outpatient Prescriptions   Medication Sig Dispense Refill   • Cholecalciferol (VITAMIN D3 PO) Take  by mouth.     • Multiple Vitamins-Minerals (MULTIVITAMIN ADULT PO) Take  by mouth.     • ascorbic acid (ASCORBIC ACID) 500 MG Tab Take 2 Tabs by mouth every day. 60 Tab 2   • meloxicam (MOBIC) 7.5 MG Tab TAKE ONE TABLET BY MOUTH TWO TIMES A  Tab 0   • vitamin E  (VITAMIN E) 1000 UNIT Cap TAKE ONE CAPSULE BY MOUTH EVERY DAY 60 Cap 3   • Vitamins/Minerals Tab   99   • meloxicam (MOBIC) 7.5 MG Tab TAKE ONE TABLET BY MOUTH TWO TIMES A DAY 60 Tab 2   • Meloxicam 7.5 MG/5ML Suspension Take  by mouth 2 Times a Day.     • MethylPREDNISolone (MEDROL DOSEPAK) 4 MG Tablet Therapy Pack As dir 1 Kit 0   • clotrimazole-betamethasone (LOTRISONE) 1-0.05 % Cream Apply 5 oz to affected area(s) 2 times a day. Will apply to the back of the neck twice a day. 2 Tube 4   • ferrous sulfate 325 (65 Fe) MG EC tablet Take 1 Tab by mouth 3 times a day, with meals. 90 Tab 1   • folic acid (FOLVITE) 1 MG Tab Take 1 Tab by mouth every day. 90 Tab 1   • Aspirin-Caffeine (ANACIN PO) Take  by mouth.       No current facility-administered medications for this visit.        Social History:     Social History     Social History   • Marital status: Single     Spouse name: N/A   • Number of children: 1   • Years of education: N/A     Occupational History   • Not on file.     Social History Main Topics   • Smoking status: Current Every Day Smoker     Packs/day: 0.25     Years: 10.00     Types: Cigars   • Smokeless tobacco: Never Used      Comment: 3 cigars a day   • Alcohol use Yes      Comment: half pint/week   • Drug use: No      Comment: Many years ago   • Sexual activity: Not Currently     Other Topics Concern   • Not on file     Social History Narrative   • No narrative on file       Family History:     Family History   Problem Relation Age of Onset   • Cancer Paternal Aunt         Colon cancer       Review of Systems:  All other review of systems are negative except what was mentioned above in the HPI.    Constitutional: No fever, chills, positive weight loss ,malaise/fatigue.    HEENT: No new auditory or visual complaints. No sore throat and neck pain.     Respiratory:No new cough, sputum production, shortness of breath and wheezing.    Cardiovascular: No new chest pain, palpitations, orthopnea and leg  "swelling.    Gastrointestinal: No heartburn, nausea, vomiting ,abdominal pain, hematochezia or melena     Genitourinary: Negative for dysuria, hematuria    Musculoskeletal: No new arthralgias or myalgias   Skin: Negative for rash and itching.    Neurological: Negative for focal weakness or headaches.    Endo/Heme/Allergies: No abnormal bleed/bruise.    Psychiatric/Behavioral: No new depression/anxiety.    Physical Exam:  Vitals:   /94   Pulse 83   Temp 37 °C (98.6 °F)   Resp 16   Ht 1.6 m (5' 3\")   Wt 51 kg (112 lb 5.2 oz)   SpO2 97%   BMI 19.90 kg/m²     General: Not in acute distress, alert and oriented x 3  HEENT: No pallor, icterus. Oropharynx clear. , Poor dentition  Neck: Supple, no palpable masses.  Lymph nodes: No palpable cervical, supraclavicular, axillary or inguinal lymphadenopathy.    CVS: regular rate and rhythm, no rubs or gallops  RESP: Clear to auscultate bilaterally, no wheezing or crackles.   ABD: Soft, non tender, non distended, positive bowel sounds, no palpable organomegaly  EXT: No edema or cyanosis  CNS: Alert and oriented x3, No focal deficits.  Skin- No rash.  Breast -left breast is pretty much outdoor digested with fungating lesion involving the chest wall. Probable axillary adenopathy on the left side    Labs:   No results for input(s): RBC, HEMOGLOBIN, HEMATOCRIT, PLATELETCT, PROTHROMBTM, APTT, INR, IRON, FERRITIN, TOTIRONBC in the last 72 hours.  Lab Results   Component Value Date/Time    SODIUM 139 06/23/2017 03:34 AM    POTASSIUM 4.2 06/23/2017 03:34 AM    CHLORIDE 112 06/23/2017 03:34 AM    CO2 24 06/23/2017 03:34 AM    GLUCOSE 97 06/23/2017 03:34 AM    BUN 15 06/23/2017 03:34 AM    CREATININE 0.45 (L) 06/23/2017 03:34 AM        Assessment and Plan:    Neglected breast Ca- ER/ID positive, HER-2/lexis negative.-She had the mass for around 7-8 years now and has not sought any medical care. Biopsies consistent with the estrogen receptor positive phenotype as above.  . We will " obtain a PET scan for further staging. She denies having any other new symptoms. It is she has some arthritis and is hard to know whether she has bony metastatic disease or not.  -Discussed the option of starting an aromatase inhibitor to control her disease, long-term.  -Will also consider chest wall irradiation for local control.  -Left and DEXA scan and tumor marker studies.  -Long discussion about the natural history of breast carcinoma. Informed the patient that she probably may have metastatic disease, which is not curable.  -Discussed adverse effects of Arimidex and she agreed to start the treatment.  -Will get navigator involved to help her with her financial/social issues  She agreed and verbalized her agreement and understanding with the current plan.  I answered all questions and concerns she has at this time.              Thank you for allowing me to participate in her care.    Please note that this dictation was created using voice recognition software. I have made every reasonable attempt to correct obvious errors, but I expect that there are errors of grammar and possibly content that I did not discover before finalizing the note.      SIGNATURES:  Bk Croft    CC:  Keith Gonzalez M.D.  No ref. provider found

## 2018-08-24 ENCOUNTER — TELEPHONE (OUTPATIENT)
Dept: HEMATOLOGY ONCOLOGY | Facility: MEDICAL CENTER | Age: 63
End: 2018-08-24

## 2018-08-24 NOTE — TELEPHONE ENCOUNTER
Attempted to reach patient on both listed numbers to address her questions. LVM to call Alona RN at [phone].

## 2018-08-24 NOTE — TELEPHONE ENCOUNTER
Dougie wanting to know if she can start her Arimidex before completing her lab work on Monday. OK per Dr. Croft. Pt denies any further questions or concerns.

## 2018-08-29 ENCOUNTER — TELEPHONE (OUTPATIENT)
Dept: HEMATOLOGY ONCOLOGY | Facility: MEDICAL CENTER | Age: 63
End: 2018-08-29

## 2018-08-29 NOTE — TELEPHONE ENCOUNTER
New Oncology Patient 48 hour follow up:    Left voicemail for patient requesting a return call to follow up on initial oncology visit with Dr. Croft on 8/23/2018.

## 2018-09-05 ENCOUNTER — APPOINTMENT (OUTPATIENT)
Dept: RADIOLOGY | Facility: MEDICAL CENTER | Age: 63
End: 2018-09-05
Attending: INTERNAL MEDICINE
Payer: MEDICAID

## 2018-09-06 NOTE — TELEPHONE ENCOUNTER
New Oncology Patient 48 hour follow up:    Called to welcome patient to [facility] and to follow up on initial consult with Dr. Croft on 8/23/2018. Reviewed next steps in the patient’s plan of care. Patient confirmed, answered all patients questions and thanked them for their time. Provided our phone number, [phone], for patient should they have any further questions or concerns.

## 2018-09-14 ENCOUNTER — PATIENT OUTREACH (OUTPATIENT)
Dept: OTHER | Facility: MEDICAL CENTER | Age: 63
End: 2018-09-14

## 2018-09-14 NOTE — PROGRESS NOTES
"Returned pt's call.  She states she is behind on her bills and needs financial assistance in order to keep her water on.    Pt states she lives in a home on three acres in [place].  In lieu of rent, she cares for the property.  She states that the owner of the property filed bankruptcy earlier this year.  She does not express fear of eviction as she has spoke with the bank and they do not wish her to vacate at this time.    Her income is 130.00/mo from back child support and 194.00/mo in food stamps.  Expenses include:  Power 80.00/mo (she is behind two months; total due at this time is: 137.87)  Water: 65.00/mo( total owed 255.98 which would pay through November.  She has signed a  payment schedule agreement and fears she will have her water turned off soon.)  Phone: paid by friend / she also has safe link  Dog food: 10.00/mo  She shops at a convenient store unless friends are able to take her to town.  Bottled water: 12.00/wk     She heats her home with oil space heaters as the furnace has not been working since .  She did apply for energy assistance but it has  for 2018.  She does not have hot water in the home.      She states she has been working with someone at [facility], but does not remember their name.  At pt's request phoned  [phone] to speak with a representative to discuss potential resources.  Call transferred to  Casie Decker.  No answer, lvm.      She states she has a 38 y.o. Daughter whom she has not spoken to in ten years.    She states she cannot work as she has a fractured hip and a \"semi amputated arm\".  A PET scan is pending to evaluate her hip.    Explained that ONN will speak with [facility] staff to discuss potential resources.  Pt grateful for assistance and will plan to call ONN next week for f/u.                       "

## 2018-09-20 ENCOUNTER — HOSPITAL ENCOUNTER (OUTPATIENT)
Dept: RADIOLOGY | Facility: MEDICAL CENTER | Age: 63
End: 2018-09-20
Attending: INTERNAL MEDICINE
Payer: MEDICAID

## 2018-09-20 DIAGNOSIS — Z17.0 MALIGNANT NEOPLASM OF NIPPLE OF LEFT BREAST IN FEMALE, ESTROGEN RECEPTOR POSITIVE (HCC): ICD-10-CM

## 2018-09-20 DIAGNOSIS — C50.012 MALIGNANT NEOPLASM OF NIPPLE OF LEFT BREAST IN FEMALE, ESTROGEN RECEPTOR POSITIVE (HCC): ICD-10-CM

## 2018-09-20 PROCEDURE — A9552 F18 FDG: HCPCS

## 2018-09-21 DIAGNOSIS — M16.11 PRIMARY OSTEOARTHRITIS OF RIGHT HIP: ICD-10-CM

## 2018-09-24 RX ORDER — MELOXICAM 7.5 MG/1
TABLET ORAL
Qty: 180 TAB | Refills: 0 | Status: SHIPPED | OUTPATIENT
Start: 2018-09-24 | End: 2018-11-26 | Stop reason: SDUPTHER

## 2018-09-24 RX ORDER — ASCORBIC ACID 500 MG
TABLET ORAL
Qty: 180 TAB | Refills: 0 | Status: ON HOLD | OUTPATIENT
Start: 2018-09-24 | End: 2019-12-23

## 2018-09-25 DIAGNOSIS — C50.012 MALIGNANT NEOPLASM OF NIPPLE OF LEFT BREAST IN FEMALE, ESTROGEN RECEPTOR POSITIVE (HCC): ICD-10-CM

## 2018-09-25 DIAGNOSIS — Z17.0 MALIGNANT NEOPLASM OF NIPPLE OF LEFT BREAST IN FEMALE, ESTROGEN RECEPTOR POSITIVE (HCC): ICD-10-CM

## 2018-09-25 NOTE — PROGRESS NOTES
Confirmed with patient that she is compliant with her Arimidex therapy. When patient was informed that Dr. Croft would like for her to see Radiation Oncology she refused despite education and states that she will discuss this further with him at their next f/v. She denies any questions or concerns.

## 2018-10-08 ENCOUNTER — OFFICE VISIT (OUTPATIENT)
Dept: HEMATOLOGY ONCOLOGY | Facility: MEDICAL CENTER | Age: 63
End: 2018-10-08
Payer: MEDICAID

## 2018-10-08 ENCOUNTER — TELEPHONE (OUTPATIENT)
Dept: HEMATOLOGY ONCOLOGY | Facility: MEDICAL CENTER | Age: 63
End: 2018-10-08

## 2018-10-08 VITALS
WEIGHT: 112.99 LBS | SYSTOLIC BLOOD PRESSURE: 171 MMHG | HEIGHT: 63 IN | RESPIRATION RATE: 18 BRPM | DIASTOLIC BLOOD PRESSURE: 105 MMHG | BODY MASS INDEX: 20.02 KG/M2 | OXYGEN SATURATION: 96 % | HEART RATE: 94 BPM | TEMPERATURE: 99.3 F

## 2018-10-08 DIAGNOSIS — M16.11 PRIMARY OSTEOARTHRITIS OF RIGHT HIP: ICD-10-CM

## 2018-10-08 DIAGNOSIS — C50.012 MALIGNANT NEOPLASM OF NIPPLE OF LEFT BREAST IN FEMALE, ESTROGEN RECEPTOR POSITIVE (HCC): ICD-10-CM

## 2018-10-08 DIAGNOSIS — Z17.0 MALIGNANT NEOPLASM OF NIPPLE OF LEFT BREAST IN FEMALE, ESTROGEN RECEPTOR POSITIVE (HCC): ICD-10-CM

## 2018-10-08 PROCEDURE — 99214 OFFICE O/P EST MOD 30 MIN: CPT | Performed by: INTERNAL MEDICINE

## 2018-10-08 RX ORDER — ANASTROZOLE 1 MG/1
1 TABLET ORAL DAILY
Qty: 90 TAB | Refills: 3 | Status: SHIPPED | OUTPATIENT
Start: 2018-10-08 | End: 2019-07-23 | Stop reason: SDUPTHER

## 2018-10-08 ASSESSMENT — PAIN SCALES - GENERAL: PAINLEVEL: 8=MODERATE-SEVERE PAIN

## 2018-10-08 NOTE — TELEPHONE ENCOUNTER
I called spoke with the patient regarding her upcoming appointment with Dr Croft . I called to ask if we could switch her future appointment to Tele health she stated No. She will not drive 45 mile more to do that. Thank you

## 2018-10-08 NOTE — PROGRESS NOTES
Date of visit: 10/8/2018  10:34 AM      Chief Complaint- Neglected breast cancer, ER/PA positive, HER-2/lexis negative      Identification/Prior relevant history:   Presented with left breast mass in 2010.  -She never sought any medical care for this with the understanding that she may have cancer.  -Her left breast is pretty much destroyed by the breast cancer by now.  -She was a no-show for multiple prior appointments.  -Had a biopsy done at Dr. Ingram's office  -Grade 2 invasive ductal carcinoma, ER strongly positive, PR10-20% , Ki-67 95%, HER-2/lexis negative  -8/23/18-establish care with me. Started on anastrozole.    Interim history  -She reports good improvement in her breast wound, which has stopped draining. Improvement in the axillary adenopathy.  9/20/18-PET scan  -Large FDG avid left breast masses consistent with known primary malignancy.  2.  Prominent left axillary lymph nodes are FDG avid and consistent with metastatic disease.  3.   Focal activity in the left maxilla is likely inflammatory and related to dental disease.  4.  Right maxillary sinus disease with increased metabolic activity, likely inflammatory.  5.  Foci of activity in the spine are likely related to degenerative disease.    . Her main complaint remains severe right hip pain from the osteoarthritis. Denies significant pain in her back    Past Medical History:      Past Medical History:   Diagnosis Date   • ETOH abuse    • Teeth decayed        Past surgical history:       Past Surgical History:   Procedure Laterality Date   • LACERATION REPAIR Right 6/18/2017    Procedure: REPAIR OF MULTIPLE FLEXOR TENDONS LACERATIONS; REPAIR OF ULNAR NERVE LACERATION;  Surgeon: Mert Madsen M.D.;  Location: SURGERY [facility];  Service:    • IRRIGATION & DEBRIDEMENT GENERAL Right 6/18/2017    Procedure: IRRIGATION & DEBRIDEMENT GENERAL;  Surgeon: Mert Madsen M.D.;  Location: SURGERY [facility];  Service:    • WOUND CLOSURE GENERAL Right  6/18/2017    Procedure: WOUND CLOSURE GENERAL;  Surgeon: Mert Madsen M.D.;  Location: SURGERY [facility];  Service:        Allergies:       Versed    Medications:         Current Outpatient Prescriptions   Medication Sig Dispense Refill   • meloxicam (MOBIC) 7.5 MG Tab TAKE ONE TABLET BY MOUTH TWO TIMES A  Tab 0   • ascorbic acid (ASCORBIC ACID) 500 MG Tab TAKE TWO TABLETS BY MOUTH EVERY  Tab 0   • Cholecalciferol (VITAMIN D3 PO) Take  by mouth.     • Vitamins/Minerals Tab   99   • Multiple Vitamins-Minerals (MULTIVITAMIN ADULT PO) Take  by mouth.     • anastrozole (ARIMIDEX) 1 MG Tab Take 1 Tab by mouth every day. 30 Tab 3   • vitamin E (VITAMIN E) 1000 UNIT Cap TAKE ONE CAPSULE BY MOUTH EVERY DAY 60 Cap 3   • ferrous sulfate 325 (65 Fe) MG EC tablet Take 1 Tab by mouth 3 times a day, with meals. 90 Tab 1   • folic acid (FOLVITE) 1 MG Tab Take 1 Tab by mouth every day. 90 Tab 1   • meloxicam (MOBIC) 7.5 MG Tab TAKE ONE TABLET BY MOUTH TWO TIMES A  Tab 0   • Meloxicam 7.5 MG/5ML Suspension Take  by mouth 2 Times a Day.     • MethylPREDNISolone (MEDROL DOSEPAK) 4 MG Tablet Therapy Pack As dir 1 Kit 0   • clotrimazole-betamethasone (LOTRISONE) 1-0.05 % Cream Apply 5 oz to affected area(s) 2 times a day. Will apply to the back of the neck twice a day. 2 Tube 4   • Aspirin-Caffeine (ANACIN PO) Take  by mouth.       No current facility-administered medications for this visit.          Social History:     Social History     Social History   • Marital status: Single     Spouse name: N/A   • Number of children: 1   • Years of education: N/A     Occupational History   • Not on file.     Social History Main Topics   • Smoking status: Current Every Day Smoker     Packs/day: 0.25     Years: 10.00     Types: Cigars   • Smokeless tobacco: Never Used      Comment: 3 cigars a day   • Alcohol use Yes      Comment: half pint/week   • Drug use: No      Comment: Many years ago   • Sexual activity: Not  "Currently     Other Topics Concern   • Not on file     Social History Narrative   • No narrative on file       Family History:      Family History   Problem Relation Age of Onset   • Cancer Paternal Aunt         Colon cancer       Review of Systems:  All other review of systems are negative except what was mentioned above in the HPI.    Constitutional: Negative for fever, chills, weight loss and malaise/fatigue.    HEENT: No new auditory or visual complaints. No sore throat and neck pain.     Respiratory: Negative for cough, sputum production, shortness of breath and wheezing.    Cardiovascular: Negative for chest pain, palpitations, orthopnea and leg swelling.    Gastrointestinal: Negative for heartburn, nausea, vomiting and abdominal pain.    Genitourinary: Negative for dysuria, hematuria    Musculoskeletal: No new arthralgias or myalgias   Skin: Negative for rash and itching.    Neurological: Negative for focal weakness and headaches.    Endo/Heme/Allergies: No abnormal bleed/bruise.    Psychiatric/Behavioral: No new depression/anxiety.    Physical Exam:  Vitals: BP (!) 171/105 (BP Location: Right arm, Patient Position: Sitting, BP Cuff Size: Adult)   Pulse 94   Temp 37.4 °C (99.3 °F) (Temporal)   Resp 18   Ht 1.6 m (5' 3\")   Wt 51.2 kg (112 lb 15.8 oz)   SpO2 96%   BMI 20.01 kg/m²     General: Not in acute distress, alert and oriented x 3  HEENT: No pallor, icterus. Oropharynx clear.   Neck: Supple, no palpable masses.  Lymph nodes: No palpable cervical, supraclavicular, axillary or inguinal lymphadenopathy.    CVS: regular rate and rhythm, no rubs or gallops  RESP: Clear to auscultate bilaterally, no wheezing or crackles.   ABD: Soft, non tender, non distended, positive bowel sounds, no palpable organomegaly  EXT: No edema or cyanosis  CNS: Alert and oriented x3, No focal deficits.  Skin- No rash  -Significant improvement in the breast mass which has stopped draining. She does not have much of breast " tissue left over. He improved left axillary adenopathy    Labs:   No visits with results within 1 Week(s) from this visit.   Latest known visit with results is:   Hospital Outpatient Visit on 10/24/2017   Component Date Value Ref Range Status   • WBC 10/24/2017 10.1  4.8 - 10.8 K/uL Final   • RBC 10/24/2017 5.08  4.20 - 5.40 M/uL Final   • Hemoglobin 10/24/2017 16.6* 12.0 - 16.0 g/dL Final   • Hematocrit 10/24/2017 49.5* 37.0 - 47.0 % Final   • MCV 10/24/2017 97.4  81.4 - 97.8 fL Final   • MCH 10/24/2017 32.7  27.0 - 33.0 pg Final   • MCHC 10/24/2017 33.5* 33.6 - 35.0 g/dL Final   • RDW 10/24/2017 53.9* 35.9 - 50.0 fL Final   • Platelet Count 10/24/2017 391  164 - 446 K/uL Final   • MPV 10/24/2017 11.0  9.0 - 12.9 fL Final   • Iron 10/24/2017 119  40 - 170 ug/dL Final   • Total Iron Binding 10/24/2017 437  250 - 450 ug/dL Final   • % Saturation 10/24/2017 27  15 - 55 % Final   • Folate -Folic Acid 10/24/2017 >23.2  >4.0 ng/mL Final   • Vitamin B12 -True Cobalamin 10/24/2017 211  211 - 911 pg/mL Final       Assessment and Plan:    Neglected breast Ca- T4N2 Mx ER/KS positive, HER-2/lexis negative.-She had the mass for around 7-8 years now and has not sought any medical care. Biopsies consistent with the estrogen receptor positive phenotype as above.  Reviewed the PET scan images which show the large breast mass and axillary adenopathy. No convincing evidence of distant metastatic disease otherwise. She does have some uptake in the spine, however, degenerative changes are favored.    , She had significant improvement in size of the breast mass and axillary adenopathy with Arimidex. Since, she does not have documented distant metastatic disease. We will refer her for radiation oncology to see whether she will benefit from breast/axillary radiation for long-term control. Does not appear that she is a candidate for mastectomy given her presentation.  . She is tolerating Arimidex well and reports being compliant. Hopefully,  she will have  durable response as her tumor appears to be sensitive to Arimidex    #2. Right hip pain secondary to severe osteoarthritis. I will refer her to orthopedics    #3. Possible osteoporosis-I have ordered a DEXA scan. Not a great candidate for bisphosphonate due to dental issues      She agreed and verbalized  agreement and understanding with the current plan.  I answered all questions and concerns at this time         Please note that this dictation was created using voice recognition software. I have made every reasonable attempt to correct obvious errors, but I expect that there are errors of grammar and possibly content that I did not discover before finalizing the note.      SIGNATURES:  Bk Croft    CC:  Keith Gonzalez M.D.  No ref. provider found

## 2018-10-08 NOTE — PROGRESS NOTES
Date of visit: 10/8/2018  10:29 AM      Chief Complaint-       Identification/Prior relevant history: Dougie Baptiste  is a 63 y.o. year old female who is here for follow-up of    Interim history    Past Medical History:      Past Medical History:   Diagnosis Date   • ETOH abuse    • Teeth decayed        Past surgical history:       Past Surgical History:   Procedure Laterality Date   • LACERATION REPAIR Right 6/18/2017    Procedure: REPAIR OF MULTIPLE FLEXOR TENDONS LACERATIONS; REPAIR OF ULNAR NERVE LACERATION;  Surgeon: Mert Madsen M.D.;  Location: SURGERY [facility];  Service:    • IRRIGATION & DEBRIDEMENT GENERAL Right 6/18/2017    Procedure: IRRIGATION & DEBRIDEMENT GENERAL;  Surgeon: Mert Madsen M.D.;  Location: SURGERY [facility];  Service:    • WOUND CLOSURE GENERAL Right 6/18/2017    Procedure: WOUND CLOSURE GENERAL;  Surgeon: Mert Madsen M.D.;  Location: SURGERY [facility];  Service:        Allergies:       Versed    Medications:         Current Outpatient Prescriptions   Medication Sig Dispense Refill   • meloxicam (MOBIC) 7.5 MG Tab TAKE ONE TABLET BY MOUTH TWO TIMES A  Tab 0   • ascorbic acid (ASCORBIC ACID) 500 MG Tab TAKE TWO TABLETS BY MOUTH EVERY  Tab 0   • Cholecalciferol (VITAMIN D3 PO) Take  by mouth.     • Vitamins/Minerals Tab   99   • Multiple Vitamins-Minerals (MULTIVITAMIN ADULT PO) Take  by mouth.     • anastrozole (ARIMIDEX) 1 MG Tab Take 1 Tab by mouth every day. 30 Tab 3   • vitamin E (VITAMIN E) 1000 UNIT Cap TAKE ONE CAPSULE BY MOUTH EVERY DAY 60 Cap 3   • ferrous sulfate 325 (65 Fe) MG EC tablet Take 1 Tab by mouth 3 times a day, with meals. 90 Tab 1   • folic acid (FOLVITE) 1 MG Tab Take 1 Tab by mouth every day. 90 Tab 1   • meloxicam (MOBIC) 7.5 MG Tab TAKE ONE TABLET BY MOUTH TWO TIMES A  Tab 0   • Meloxicam 7.5 MG/5ML Suspension Take  by mouth 2 Times a Day.     • MethylPREDNISolone (MEDROL DOSEPAK) 4 MG Tablet Therapy Pack As dir 1 Kit  0   • clotrimazole-betamethasone (LOTRISONE) 1-0.05 % Cream Apply 5 oz to affected area(s) 2 times a day. Will apply to the back of the neck twice a day. 2 Tube 4   • Aspirin-Caffeine (ANACIN PO) Take  by mouth.       No current facility-administered medications for this visit.          Social History:     Social History     Social History   • Marital status: Single     Spouse name: N/A   • Number of children: 1   • Years of education: N/A     Occupational History   • Not on file.     Social History Main Topics   • Smoking status: Current Every Day Smoker     Packs/day: 0.25     Years: 10.00     Types: Cigars   • Smokeless tobacco: Never Used      Comment: 3 cigars a day   • Alcohol use Yes      Comment: half pint/week   • Drug use: No      Comment: Many years ago   • Sexual activity: Not Currently     Other Topics Concern   • Not on file     Social History Narrative   • No narrative on file       Family History:      Family History   Problem Relation Age of Onset   • Cancer Paternal Aunt         Colon cancer       Review of Systems:  All other review of systems are negative except what was mentioned above in the HPI.    Constitutional: Negative for fever, chills, weight loss and malaise/fatigue.    HEENT: No new auditory or visual complaints. No sore throat and neck pain.     Respiratory: Negative for cough, sputum production, shortness of breath and wheezing.    Cardiovascular: Negative for chest pain, palpitations, orthopnea and leg swelling.    Gastrointestinal: Negative for heartburn, nausea, vomiting and abdominal pain.    Genitourinary: Negative for dysuria, hematuria    Musculoskeletal: No new arthralgias or myalgias   Skin: Negative for rash and itching.    Neurological: Negative for focal weakness and headaches.    Endo/Heme/Allergies: No abnormal bleed/bruise.    Psychiatric/Behavioral: No new depression/anxiety.    Physical Exam:  Vitals: BP (!) 171/105 (BP Location: Right arm, Patient Position: Sitting,  "BP Cuff Size: Adult)   Pulse 94   Temp 37.4 °C (99.3 °F) (Temporal)   Resp 18   Ht 1.6 m (5' 3\")   Wt 51.2 kg (112 lb 15.8 oz)   SpO2 96%   BMI 20.01 kg/m²     General: Not in acute distress, alert and oriented x 3  HEENT: No pallor, icterus. Oropharynx clear.   Neck: Supple, no palpable masses.  Lymph nodes: No palpable cervical, supraclavicular, axillary or inguinal lymphadenopathy.    CVS: regular rate and rhythm, no rubs or gallops  RESP: Clear to auscultate bilaterally, no wheezing or crackles.   ABD: Soft, non tender, non distended, positive bowel sounds, no palpable organomegaly  EXT: No edema or cyanosis  CNS: Alert and oriented x3, No focal deficits.  Skin- No rash      Labs:   No visits with results within 1 Week(s) from this visit.   Latest known visit with results is:   Hospital Outpatient Visit on 10/24/2017   Component Date Value Ref Range Status   • WBC 10/24/2017 10.1  4.8 - 10.8 K/uL Final   • RBC 10/24/2017 5.08  4.20 - 5.40 M/uL Final   • Hemoglobin 10/24/2017 16.6* 12.0 - 16.0 g/dL Final   • Hematocrit 10/24/2017 49.5* 37.0 - 47.0 % Final   • MCV 10/24/2017 97.4  81.4 - 97.8 fL Final   • MCH 10/24/2017 32.7  27.0 - 33.0 pg Final   • MCHC 10/24/2017 33.5* 33.6 - 35.0 g/dL Final   • RDW 10/24/2017 53.9* 35.9 - 50.0 fL Final   • Platelet Count 10/24/2017 391  164 - 446 K/uL Final   • MPV 10/24/2017 11.0  9.0 - 12.9 fL Final   • Iron 10/24/2017 119  40 - 170 ug/dL Final   • Total Iron Binding 10/24/2017 437  250 - 450 ug/dL Final   • % Saturation 10/24/2017 27  15 - 55 % Final   • Folate -Folic Acid 10/24/2017 >23.2  >4.0 ng/mL Final   • Vitamin B12 -True Cobalamin 10/24/2017 211  211 - 911 pg/mL Final             Assessment and Plan:      She agreed and verbalized  agreement and understanding with the current plan.  I answered all questions and concerns at this time         Please note that this dictation was created using voice recognition software. I have made every reasonable attempt to " correct obvious errors, but I expect that there are errors of grammar and possibly content that I did not discover before finalizing the note.      SIGNATURES:  Bk Croft    CC:  Keith Gonzalez M.D.  No ref. provider found

## 2018-10-11 ENCOUNTER — TELEPHONE (OUTPATIENT)
Dept: HEMATOLOGY ONCOLOGY | Facility: MEDICAL CENTER | Age: 63
End: 2018-10-11

## 2018-10-11 NOTE — TELEPHONE ENCOUNTER
Telephone call from patient. Stated she has bone density scan on 10/19/18 at 820 am. [facility] 930 am. Informed patient I will make a note in her chart. Pt asked for CD of her bone density scan. I instructed her to ask the  when she goes to her appointment and they will give he a CD. She also requested a print out of her medical records. I instructed her she would need to make that request to Medical Records and offered to transfer her. She stated she will call Medical Records.

## 2018-10-12 ENCOUNTER — TELEPHONE (OUTPATIENT)
Dept: HEMATOLOGY ONCOLOGY | Facility: MEDICAL CENTER | Age: 63
End: 2018-10-12

## 2018-10-12 NOTE — TELEPHONE ENCOUNTER
Patient would like a call back regarding if she still needs to be referred over to radiation oncology? Please give her a call as soon as possible.

## 2018-10-19 ENCOUNTER — HOSPITAL ENCOUNTER (OUTPATIENT)
Dept: RADIOLOGY | Facility: MEDICAL CENTER | Age: 63
End: 2018-10-19
Attending: INTERNAL MEDICINE
Payer: MEDICAID

## 2018-10-19 DIAGNOSIS — M16.11 PRIMARY OSTEOARTHRITIS OF RIGHT HIP: ICD-10-CM

## 2018-10-19 DIAGNOSIS — Z17.0 MALIGNANT NEOPLASM OF NIPPLE OF LEFT BREAST IN FEMALE, ESTROGEN RECEPTOR POSITIVE (HCC): ICD-10-CM

## 2018-10-19 DIAGNOSIS — C50.012 MALIGNANT NEOPLASM OF NIPPLE OF LEFT BREAST IN FEMALE, ESTROGEN RECEPTOR POSITIVE (HCC): ICD-10-CM

## 2018-10-19 PROCEDURE — 77080 DXA BONE DENSITY AXIAL: CPT

## 2018-10-25 DIAGNOSIS — C50.012 MALIGNANT NEOPLASM OF NIPPLE OF LEFT BREAST IN FEMALE, ESTROGEN RECEPTOR POSITIVE (HCC): ICD-10-CM

## 2018-10-25 DIAGNOSIS — Z17.0 MALIGNANT NEOPLASM OF NIPPLE OF LEFT BREAST IN FEMALE, ESTROGEN RECEPTOR POSITIVE (HCC): ICD-10-CM

## 2018-10-31 ENCOUNTER — TELEPHONE (OUTPATIENT)
Dept: HEMATOLOGY ONCOLOGY | Facility: MEDICAL CENTER | Age: 63
End: 2018-10-31

## 2018-10-31 NOTE — TELEPHONE ENCOUNTER
Patient called just to let us know she has a consulation with Dr. Quarles at radiation oncology on 11/13/2018. Will give us a call if there are any further questions.

## 2018-11-05 ENCOUNTER — PATIENT OUTREACH (OUTPATIENT)
Dept: OTHER | Facility: MEDICAL CENTER | Age: 63
End: 2018-11-05

## 2018-11-05 NOTE — PROGRESS NOTES
Phoned pt for f/u.  Pt states she has confirmed xportation via MTM for her upcoming XRT consultation on 11/13/18.    On 11/7/18 pt states she got a notice that the home she is living in will be going up for auction and she will need to vacate.  Pt states she plans to talk to the bank and request an extension.    She recently applied for SSD.  She has submitted paperwork, medical records and completed a phone interview.  She states she mailed the paperwork to their office on 10/29/18 .    She anticipates 341.00/mo.    She states she does not have a friend or family member to stay with if she is asked to vacate.      She has been working with a woman named Baldo Koenig  ([phone] who has provided her with information on resources.     Referral for OSW placed.

## 2018-11-13 ENCOUNTER — PATIENT OUTREACH (OUTPATIENT)
Dept: OTHER | Facility: MEDICAL CENTER | Age: 63
End: 2018-11-13

## 2018-11-13 ENCOUNTER — HOSPITAL ENCOUNTER (OUTPATIENT)
Dept: RADIATION ONCOLOGY | Facility: MEDICAL CENTER | Age: 63
End: 2018-11-30
Attending: RADIOLOGY
Payer: MEDICAID

## 2018-11-13 VITALS
DIASTOLIC BLOOD PRESSURE: 77 MMHG | BODY MASS INDEX: 18.42 KG/M2 | TEMPERATURE: 98.8 F | HEIGHT: 66 IN | WEIGHT: 114.62 LBS | HEART RATE: 60 BPM | SYSTOLIC BLOOD PRESSURE: 175 MMHG | OXYGEN SATURATION: 99 %

## 2018-11-13 DIAGNOSIS — C50.012 MALIGNANT NEOPLASM OF NIPPLE OF LEFT BREAST IN FEMALE, UNSPECIFIED ESTROGEN RECEPTOR STATUS (HCC): ICD-10-CM

## 2018-11-13 PROCEDURE — 99214 OFFICE O/P EST MOD 30 MIN: CPT | Performed by: RADIOLOGY

## 2018-11-13 PROCEDURE — 99205 OFFICE O/P NEW HI 60 MIN: CPT | Performed by: RADIOLOGY

## 2018-11-13 ASSESSMENT — PAIN SCALES - GENERAL: PAINLEVEL: NO PAIN
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
## 2018-11-13 NOTE — NON-PROVIDER
"Patient was seen today in clinic with Dr. Quarles for breast cancer.  Vitals signs and weight were obtained and pain assessment was completed.  Allergies and medications were reviewed with the patient.  Review of systems completed.     Vitals/Pain:  Vitals:    11/13/18 0952   BP: (!) 175/77   BP Location: Right arm   Patient Position: Sitting   Pulse: 60   Temp: 37.1 °C (98.8 °F)   TempSrc: Temporal   SpO2: 99%   Weight: 52 kg (114 lb 9.9 oz)   Height: 1.676 m (5' 6\")   Pain Score: No pain        Allergies:   Versed    Current Medications:  Current Outpatient Prescriptions   Medication Sig Dispense Refill   • anastrozole (ARIMIDEX) 1 MG Tab Take 1 Tab by mouth every day. 90 Tab 3   • meloxicam (MOBIC) 7.5 MG Tab TAKE ONE TABLET BY MOUTH TWO TIMES A  Tab 0   • ascorbic acid (ASCORBIC ACID) 500 MG Tab TAKE TWO TABLETS BY MOUTH EVERY  Tab 0   • Cholecalciferol (VITAMIN D3 PO) Take  by mouth.     • Multiple Vitamins-Minerals (MULTIVITAMIN ADULT PO) Take  by mouth.     • vitamin E (VITAMIN E) 1000 UNIT Cap TAKE ONE CAPSULE BY MOUTH EVERY DAY 60 Cap 3   • folic acid (FOLVITE) 1 MG Tab Take 1 Tab by mouth every day. 90 Tab 1   • Aspirin-Caffeine (ANACIN PO) Take  by mouth.       No current facility-administered medications for this encounter.          PCP:  Carlos Eng R.N.  "

## 2018-11-13 NOTE — PROGRESS NOTES
On November 13, 2018, Oncology Social Worker Josselyn Oden attempted telephone call with pt. to schedule appointment to complete full assessment.  OSW Akshat voicemail message requesting for pt. to call OSW Akshat at earliest convenience.

## 2018-11-20 DIAGNOSIS — R21 RASH: ICD-10-CM

## 2018-11-20 NOTE — TELEPHONE ENCOUNTER
Was the patient seen in the last year in this department? Yes    Does patient have an active prescription for medications requested? Yes    Received Request Via: Pharmacy     Last Visit: 1/22/18  Last Labs:10/24/17

## 2018-11-21 RX ORDER — ASCORBIC ACID 500 MG
TABLET ORAL
Qty: 180 TAB | Refills: 1 | Status: SHIPPED | OUTPATIENT
Start: 2018-11-21 | End: 2019-05-24 | Stop reason: SDUPTHER

## 2018-11-26 DIAGNOSIS — M16.11 PRIMARY OSTEOARTHRITIS OF RIGHT HIP: ICD-10-CM

## 2018-11-26 RX ORDER — MELOXICAM 7.5 MG/1
7.5 TABLET ORAL 2 TIMES DAILY
Qty: 180 TAB | Refills: 0 | Status: SHIPPED | OUTPATIENT
Start: 2018-11-26 | End: 2018-12-05 | Stop reason: SDUPTHER

## 2018-11-26 NOTE — TELEPHONE ENCOUNTER
Was the patient seen in the last year in this department? Yes    Does patient have an active prescription for medications requested? Yes    Received Request Via: Patient    Last Visit: 1/22/18  Last Lab: 10/24/17

## 2018-11-29 ENCOUNTER — PATIENT OUTREACH (OUTPATIENT)
Dept: OTHER | Facility: MEDICAL CENTER | Age: 63
End: 2018-11-29

## 2018-11-29 DIAGNOSIS — M16.11 PRIMARY OSTEOARTHRITIS OF RIGHT HIP: ICD-10-CM

## 2018-11-29 NOTE — PROGRESS NOTES
"Pt called to report and \"close the loop.\"  Pt stated that she saw Dr. Quarles and will not be back until 19.  Pt also stated that she will be having hip replacement surgery at [place] on 19.  Pt will be taking extra calcium via pre-afia vitamins to help.  She also stated that she now has SSI and all her bills are paid.  She will contact her ONN prn.  dsw  "

## 2018-11-30 NOTE — TELEPHONE ENCOUNTER
Patient called stating her ortho doc, Dr. Calvillo, has recommended prescription grade margot calcium and prenatal vitamins. She is having a hip replacement in January and they want her stronger prior. Please advise
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
## 2018-12-06 RX ORDER — MELOXICAM 7.5 MG/1
7.5 TABLET ORAL 2 TIMES DAILY
Qty: 180 TAB | Refills: 0 | Status: SHIPPED | OUTPATIENT
Start: 2018-12-06 | End: 2019-02-19

## 2018-12-18 ENCOUNTER — TELEPHONE (OUTPATIENT)
Dept: HEMATOLOGY ONCOLOGY | Facility: MEDICAL CENTER | Age: 63
End: 2018-12-18

## 2018-12-18 ENCOUNTER — PATIENT OUTREACH (OUTPATIENT)
Dept: OTHER | Facility: MEDICAL CENTER | Age: 63
End: 2018-12-18

## 2018-12-19 NOTE — TELEPHONE ENCOUNTER
Patient called to let Dr. Croft know that she has stopped taking the medication meloxicam. She has began to take Soy Lecithin and if there is any further questions or concerns she will call our office. I let patient know I will relay this message to Dr. Croft and Josselin Cheng, Medical Assistant.

## 2018-12-19 NOTE — PROGRESS NOTES
Pt phones stating she needs a referral to a new orthopedic surgeon that can perform the procedure either at [facility] or at [facility].    In basket sent to Dr. Croft with request for new referral.

## 2019-01-02 ENCOUNTER — TELEPHONE (OUTPATIENT)
Dept: HEMATOLOGY ONCOLOGY | Facility: MEDICAL CENTER | Age: 64
End: 2019-01-02

## 2019-01-02 NOTE — TELEPHONE ENCOUNTER
Patient is requesting a prescription for Calcium. She states her primary care physician, Dr. Gonzalez, will not order it. Patient would also like to know if Soya lecithin medication conflicts with the Anastrozole medication. Please advise.

## 2019-01-02 NOTE — TELEPHONE ENCOUNTER
Patient called back and would like to also be prescribed Ventolin. She states it was prescribed to her 3 years ago when she had trouble breathing. It was prescribed at [place] urgent care.

## 2019-01-03 NOTE — TELEPHONE ENCOUNTER
Spoke with patient today regarding her requests for Ventolin prescription and calcium.  I did discuss with patient that she would need to contact her primary care provider for these medication prescriptions.  Discussed with patient that she can purchase over-the-counter calcium.  She stated that she was told by the orthopedic physician that she is to take calcium due to her osteoporosis.  I asked her to go with the recommendation dose of calcium per her orthopedic physician.    Patient also requesting to take coenzyme Q10 with Arimidex.  I did discuss with patient that that would be fine.    Patient did verbalize understanding will discuss further with her primary care provider.

## 2019-01-10 ENCOUNTER — TELEPHONE (OUTPATIENT)
Dept: HEMATOLOGY ONCOLOGY | Facility: MEDICAL CENTER | Age: 64
End: 2019-01-10

## 2019-01-10 NOTE — TELEPHONE ENCOUNTER
"Patient called and stated \"Hey I wanted to let  know something! I want to report that my left breast has cancer which he knows.\" \" I just want to report that my cancer from my armpit is spreading to my mid line, and I have no pain.\"  \"Thank you and happy new years.\"     "

## 2019-01-23 ENCOUNTER — TELEPHONE (OUTPATIENT)
Dept: HEMATOLOGY ONCOLOGY | Facility: MEDICAL CENTER | Age: 64
End: 2019-01-23

## 2019-01-23 NOTE — TELEPHONE ENCOUNTER
"I contacted the patient. She states her cancer is moving in her midline across her chest. She states the lump in her armpit is \"shrinking.\" She informs me it is the size of a pea. She states Dr. Ingram called her but she cannot recall what was said during the conversation. Patient denies pain. She informs me that \"every now and then\" when she works too hard in the yard it feels \"like a stretched muscle.\" She states the lump is moving but she is not concerned about it. Patient confirmed her appointment in April. She is aware she may contact our office with any questions or concerns.   "

## 2019-01-24 NOTE — TELEPHONE ENCOUNTER
"Patient called and stated \"I spoke with Dr. Ingram and she stated I needed to take calcium 800mg which needs to be prescribed by a provider. Can your office please send it over to [facility] pharmacy.\" I also asked patient regarding message from yesterday which was left with Chantel OHARA, Supervisor. Patient stated \"Yes, I still feel like a stretched muscle but no pain.\" I let patient know I will relay this message to Ann BERNARDO RN. Patient agreed. Patient last seen our office on 10/08/2018 and next upcoming appointment with our office on 04/08/2019. Please advise.  "

## 2019-01-25 NOTE — TELEPHONE ENCOUNTER
Please let the patient know if another provider is requesting her to be placed on a calcium supplement she will need to get this thru her PCP.

## 2019-01-29 NOTE — TELEPHONE ENCOUNTER
"Patient returned phone call and I relayed information above. Patient sated \"Okay thank you. I will call your office if I have any further questions or concerns.\" I agreed.  "

## 2019-01-29 NOTE — TELEPHONE ENCOUNTER
Called and left voicemail regarding message above, patient is to reach out to primary care provider regarding calcium supplements. When patient returns phone call please relay information.

## 2019-02-04 RX ORDER — MULTIVIT WITH MINERALS/LUTEIN
TABLET ORAL
Qty: 60 CAP | Refills: 3 | OUTPATIENT
Start: 2019-02-04

## 2019-02-04 NOTE — TELEPHONE ENCOUNTER
Was the patient seen in the last year in this department? No     Does patient have an active prescription for medications requested? Yes    Received Request Via: Pharmacy      Last visit: 1/22/18  Last labs:10/24/17

## 2019-02-05 NOTE — TELEPHONE ENCOUNTER
Called and LM informing the patient that she will need to be seen for an office visit before any further refills will be given.

## 2019-02-11 NOTE — TELEPHONE ENCOUNTER
"RN called pt to make sure she was taking her Vit C, and that she is feeling well.  She did state that she is in fact taking her Vit C and she \"feels just fine\".  I informed the pt to call us if she needs anything before her appt with Dr. Croft on 4/8/19.  She verbalized understanding.  "

## 2019-02-19 ENCOUNTER — TELEPHONE (OUTPATIENT)
Dept: HEMATOLOGY ONCOLOGY | Facility: MEDICAL CENTER | Age: 64
End: 2019-02-19

## 2019-02-19 ENCOUNTER — OFFICE VISIT (OUTPATIENT)
Dept: MEDICAL GROUP | Facility: CLINIC | Age: 64
End: 2019-02-19
Payer: MEDICAID

## 2019-02-19 VITALS
TEMPERATURE: 99.2 F | OXYGEN SATURATION: 98 % | RESPIRATION RATE: 16 BRPM | HEART RATE: 84 BPM | WEIGHT: 111 LBS | HEIGHT: 63 IN | DIASTOLIC BLOOD PRESSURE: 76 MMHG | BODY MASS INDEX: 19.67 KG/M2 | SYSTOLIC BLOOD PRESSURE: 118 MMHG

## 2019-02-19 DIAGNOSIS — Z20.828 EXPOSURE TO MEASLES VIRUS: ICD-10-CM

## 2019-02-19 DIAGNOSIS — R46.89 ABNORMAL BEHAVIOR: ICD-10-CM

## 2019-02-19 DIAGNOSIS — M16.11 PRIMARY OSTEOARTHRITIS OF RIGHT HIP: ICD-10-CM

## 2019-02-19 DIAGNOSIS — R48.8 ECHOLALIA: ICD-10-CM

## 2019-02-19 DIAGNOSIS — Z00.00 ENCOUNTER FOR MEDICAL EXAMINATION TO ESTABLISH CARE: ICD-10-CM

## 2019-02-19 DIAGNOSIS — J20.9 ACUTE BRONCHITIS, UNSPECIFIED ORGANISM: ICD-10-CM

## 2019-02-19 DIAGNOSIS — F17.200 CURRENT EVERY DAY SMOKER: ICD-10-CM

## 2019-02-19 DIAGNOSIS — Z59.9 FINANCIAL DIFFICULTIES: ICD-10-CM

## 2019-02-19 DIAGNOSIS — C50.012 MALIGNANT NEOPLASM OF NIPPLE OF LEFT BREAST IN FEMALE, UNSPECIFIED ESTROGEN RECEPTOR STATUS (HCC): ICD-10-CM

## 2019-02-19 DIAGNOSIS — M81.0 OSTEOPOROSIS, UNSPECIFIED OSTEOPOROSIS TYPE, UNSPECIFIED PATHOLOGICAL FRACTURE PRESENCE: ICD-10-CM

## 2019-02-19 DIAGNOSIS — E56.0 VITAMIN E DEFICIENCY: ICD-10-CM

## 2019-02-19 PROBLEM — R21 RASH: Status: RESOLVED | Noted: 2018-01-13 | Resolved: 2019-02-19

## 2019-02-19 PROCEDURE — 99214 OFFICE O/P EST MOD 30 MIN: CPT | Performed by: PHYSICIAN ASSISTANT

## 2019-02-19 RX ORDER — MULTIVITAMIN,THER AND MINERALS
TABLET ORAL
Refills: 99 | COMMUNITY
Start: 2019-01-11 | End: 2019-05-08 | Stop reason: SDUPTHER

## 2019-02-19 RX ORDER — ALBUTEROL SULFATE 90 UG/1
2 AEROSOL, METERED RESPIRATORY (INHALATION) EVERY 6 HOURS PRN
Qty: 8.5 G | Refills: 0 | Status: SHIPPED | OUTPATIENT
Start: 2019-02-19 | End: 2019-03-25 | Stop reason: SDUPTHER

## 2019-02-19 SDOH — ECONOMIC STABILITY - INCOME SECURITY: PROBLEM RELATED TO HOUSING AND ECONOMIC CIRCUMSTANCES, UNSPECIFIED: Z59.9

## 2019-02-19 NOTE — TELEPHONE ENCOUNTER
The patient called and stated that her swelling in her legs went down and same with the lump in her armpit.

## 2019-02-19 NOTE — TELEPHONE ENCOUNTER
Patient called to inform Dr. rCoft the lump in her armpit has gotten smaller. She also contacted our office to inform us she was not able to get her labs drawn at the lab the other day but she will make sure to have them drawn prior to her follow up scheduled in April 2019.

## 2019-02-20 DIAGNOSIS — R21 RASH: ICD-10-CM

## 2019-02-20 PROBLEM — F17.200 CURRENT EVERY DAY SMOKER: Status: ACTIVE | Noted: 2019-02-20

## 2019-02-20 PROBLEM — Z00.00 ENCOUNTER FOR MEDICAL EXAMINATION TO ESTABLISH CARE: Status: ACTIVE | Noted: 2019-02-20

## 2019-02-20 PROBLEM — M81.0 OSTEOPOROSIS: Status: ACTIVE | Noted: 2019-02-20

## 2019-02-20 PROBLEM — R46.89 ABNORMAL BEHAVIOR: Status: ACTIVE | Noted: 2019-02-20

## 2019-02-20 PROBLEM — Z20.828 EXPOSURE TO MEASLES VIRUS: Status: ACTIVE | Noted: 2019-02-20

## 2019-02-20 PROBLEM — J20.9 ACUTE BRONCHITIS: Status: ACTIVE | Noted: 2019-02-20

## 2019-02-20 PROBLEM — R48.8 ECHOLALIA: Status: ACTIVE | Noted: 2019-02-20

## 2019-02-20 PROBLEM — S61.419A HAND LACERATION: Status: RESOLVED | Noted: 2017-06-18 | Resolved: 2019-02-20

## 2019-02-20 PROBLEM — E56.0 VITAMIN E DEFICIENCY: Status: ACTIVE | Noted: 2019-02-20

## 2019-02-20 RX ORDER — CLOTRIMAZOLE AND BETAMETHASONE DIPROPIONATE 10; .64 MG/G; MG/G
CREAM TOPICAL
Qty: 90 G | Refills: 4 | Status: SHIPPED | OUTPATIENT
Start: 2019-02-20 | End: 2019-06-24

## 2019-02-20 NOTE — ASSESSMENT & PLAN NOTE
Near the end of every sentence spoken by this provider and the medical assistant that rooms her today, this patient repeats the last 4-5 words, sometimes rephrasing these words.

## 2019-02-20 NOTE — ASSESSMENT & PLAN NOTE
Followed by Dr. Croft, patient states the new lesion around her axillary region has been reported to Dr Croft and then is not clear regarding if this has been reported to him.

## 2019-02-20 NOTE — ASSESSMENT & PLAN NOTE
This patient's primary care provider recently stopped seeing patients at this location so the patient is seeking to establish care with a new provider today.

## 2019-02-20 NOTE — PROGRESS NOTES
Chief Complaint   Patient presents with   • Establish Care     Carlos pt    • Breast Cancer       HISTORY OF PRESENT ILLNESS: Patient is a 63 y.o. female established patient who presents today for evaluation and management of:    Abnormal behavior  Patient has very strange demeanor. See echolalia note. She seems somewhat paranoid, is unkempt, and makes frequent, inappropriate hand, body and head gestures. She exposes her breast abnormality without being asked or prompted. She initially states she believes that vitamins such as CoQ10, Vitamin E and other vitamins are healthier than any other medicines and when advised that OTC supplements aren't regulated by the FDA, she completely changes her stance on how and if these are to be used.     Echolalia  Near the end of every sentence spoken by this provider and the medical assistant that rooms her today, this patient repeats the last 4-5 words, sometimes rephrasing these words.     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 Croft, patient states the new lesion around her axillary region has been reported to Dr Croft and then is not clear regarding if this has been reported to him.     Encounter for medical examination to establish care  This patient's primary care provider recently stopped seeing patients at this location so the patient is seeking to establish care with a new provider today.        Patient Active Problem List    Diagnosis Date Noted   • Financial difficulties 06/22/2017     Priority: High   • Osteoarthritis of right hip 06/20/2017     Priority: Medium   • Malignant neoplasm of nipple of left breast in female (HCC) 06/18/2017     Priority: Low   • Exposure to measles virus 02/20/2019   • Current every day smoker 02/20/2019   • Acute bronchitis 02/20/2019   • Osteoporosis 02/20/2019   • Vitamin E deficiency 02/20/2019   • Abnormal behavior 02/20/2019   • Echolalia 02/20/2019   • Encounter for medical  examination to establish care 2019   • History of ETOH abuse 2017   • History of methamphetamine abuse 2017       Allergies:Versed    Current Outpatient Prescriptions   Medication Sig Dispense Refill   • albuterol 108 (90 Base) MCG/ACT Aero Soln inhalation aerosol Inhale 2 Puffs by mouth every 6 hours as needed for Shortness of Breath. 8.5 g 0   • Calcium Carbonate-Vitamin D (CALCIUM 600 + D) 600-400 MG-UNIT Tab Take 1 Tab by mouth every day. 90 Tab 3   • Vitamins/Minerals Tab   99   • ascorbic acid (ASCORBIC ACID) 500 MG Tab TAKE TWO TABLETS BY MOUTH EVERY  Tab 1   • anastrozole (ARIMIDEX) 1 MG Tab Take 1 Tab by mouth every day. 90 Tab 3   • ascorbic acid (ASCORBIC ACID) 500 MG Tab TAKE TWO TABLETS BY MOUTH EVERY  Tab 0   • Cholecalciferol (VITAMIN D3 PO) Take  by mouth.     • Multiple Vitamins-Minerals (MULTIVITAMIN ADULT PO) Take  by mouth.     • vitamin E (VITAMIN E) 1000 UNIT Cap TAKE ONE CAPSULE BY MOUTH EVERY DAY 60 Cap 3   • folic acid (FOLVITE) 1 MG Tab Take 1 Tab by mouth every day. 90 Tab 1   • Aspirin-Caffeine (ANACIN PO) Take  by mouth.       No current facility-administered medications for this visit.        Social History   Substance Use Topics   • Smoking status: Current Every Day Smoker     Packs/day: 0.25     Years: 10.00     Types: Cigars   • Smokeless tobacco: Never Used      Comment: 3 cigars a day   • Alcohol use Yes      Comment: half pint/week       Family Status   Relation Status   • Mo    • Fa    • PAunt Alive     Family History   Problem Relation Age of Onset   • Arthritis Mother    • Heart Failure Mother    • Osteoporosis Mother    • Arthritis Father    • Heart Failure Father    • Heart Attack Father    • Heart Disease Father    • Hyperlipidemia Father    • Hypertension Father    • Cancer Paternal Aunt         Colon cancer       Review of Systems: See HPI above.   Constitutional: Negative for fever, chills, weight loss and malaise.   HENT:  "Negative for ear pain, nosebleeds, congestion, sore throat and neck pain.    Eyes: Negative for blurred vision.   Respiratory: positive for shortness of breath, cough, sputum production and wheezing.    Cardiovascular: Negative for chest pain, palpitations, orthopnea and leg swelling.   Genitourinary: Negative for dysuria, urgency and frequency.   Musculoskeletal: Negative for myalgias, back pain and joint pain.   Skin: Negative for rash and itching. Positive for severe scabbing at left breast and firm, red skin in left axilla.   Neurological: Negative for dizziness, tingling, tremors, sensory change, focal weakness and headaches.   Endo/Heme/Allergies: Does not bruise/bleed easily.   Psychiatric/Behavioral: Negative for depression, suicidal ideas and memory loss.  The patient is nervous/anxious and does have insomnia, stating she takes her sleeping medicaiton at 1:30 pm daily, sleeping til 2am most days.     Exam:  Blood pressure 118/76, pulse 84, temperature 37.3 °C (99.2 °F), temperature source Temporal, resp. rate 16, height 1.6 m (5' 3\"), weight 50.3 kg (111 lb), SpO2 98 %, not currently breastfeeding.  Body mass index is 19.66 kg/m².  General:  Well Developed, Well Nourished female in NAD  Head: is grossly normal. Poor dentition.   Neck: Supple without masses. Thyroid is not visibly enlarged.  Pulmonary: Clear to ausculation. Normal effort. No rales, ronchi, or wheezing.  Cardiovascular: Regular rate and rhythm without murmur. Carotid pulses are intact and equal bilaterally.  Extremities: no clubbing, cyanosis, or edema.  Breast: left breast no longer present with an area about 10 cm x 8 cm of scabbing, and a second area about 1 cm x 1 cm of firm erythematous lesion at the left axilla with a seeming tract between the two and pulling of the skin    Medical decision-making and discussion:  1. Encounter for medical examination to establish care  I am happy to participate in the care of this 63 year old Woman. "       2. Malignant neoplasm of nipple of left breast in female, unspecified estrogen receptor status (HCC)  Advised to follow up closely with oncology as this does not look healthy at this time although I have no frame of reference.     3. Primary osteoarthritis of right hip  Advised to follow up with ortho pedics with this who has advised surgery which she has refused.   - Calcium Carbonate-Vitamin D (CALCIUM 600 + D) 600-400 MG-UNIT Tab; Take 1 Tab by mouth every day.  Dispense: 90 Tab; Refill: 3    4. Exposure to measles virus  This is suspected, not confirmed by the patient  - MEASLES IGG ANTIBODY; Future    5. Current every day smoker  Advised to quit smoking as soon as possible for improved healing and cancer prevention     6. Acute bronchitis, unspecified organism    - albuterol 108 (90 Base) MCG/ACT Aero Soln inhalation aerosol; Inhale 2 Puffs by mouth every 6 hours as needed for Shortness of Breath.  Dispense: 8.5 g; Refill: 0    7. Osteoporosis, unspecified osteoporosis type, unspecified pathological fracture presence    - Calcium Carbonate-Vitamin D (CALCIUM 600 + D) 600-400 MG-UNIT Tab; Take 1 Tab by mouth every day.  Dispense: 90 Tab; Refill: 3    8. Vitamin E deficiency    - VITAMIN E; Future    9. Financial difficulties      10. Abnormal behavior  Refuses psychiatry referral.     11. Echolalia        Please note that this dictation was created using voice recognition software. I have made every reasonable attempt to correct obvious errors, but I expect that there are errors of grammar and possibly content that I did not discover before finalizing the note.      Return in about 3 months (around 5/19/2019) for Chronic conditions.

## 2019-02-20 NOTE — ASSESSMENT & PLAN NOTE
Patient has very strange demeanor. See echolalia note. She seems somewhat paranoid, is unkempt, and makes frequent, inappropriate hand, body and head gestures. She exposes her breast abnormality without being asked or prompted. She initially states she believes that vitamins such as CoQ10, Vitamin E and other vitamins are healthier than any other medicines and when advised that OTC supplements aren't regulated by the FDA, she completely changes her stance on how and if these are to be used.

## 2019-02-20 NOTE — TELEPHONE ENCOUNTER
Was the patient seen in the last year in this department? Yes    Does patient have an active prescription for medications requested? Yes    Received Request Via: Pharmacy     Last Visit:   Last Labs:

## 2019-02-21 ENCOUNTER — TELEPHONE (OUTPATIENT)
Dept: HEMATOLOGY ONCOLOGY | Facility: MEDICAL CENTER | Age: 64
End: 2019-02-21

## 2019-02-21 NOTE — TELEPHONE ENCOUNTER
Patient requested to have lab orders mailed to her at [address] [place] [id number]. Printed orders for CMP, CBC with differential, CA 27.29, CA 15-3 and CEA. Sealed in an envelope and put into mail .

## 2019-03-08 ENCOUNTER — TELEPHONE (OUTPATIENT)
Dept: HEMATOLOGY ONCOLOGY | Facility: MEDICAL CENTER | Age: 64
End: 2019-03-08

## 2019-03-08 NOTE — TELEPHONE ENCOUNTER
"Patient called from [phone] and stated she was told to call us with an update. Stated that the calcium and Ventolin \"is working great\". She then stated that \"disability\" is making her \"glow\" from all of it. I asked for clarification and she stated \"you know the x-rays\".  Further stated that she \"got measles from a stupid woman down the road\". Also, stated that her other doctor told her that her lung is \"1/4 full of crud\" and so she has been \"blowing it out\". Patient also stated \"my boob has been bleeding alot\". Also, states she is having \"pain in her armpit\".   "

## 2019-03-11 ENCOUNTER — TELEPHONE (OUTPATIENT)
Dept: HEMATOLOGY ONCOLOGY | Facility: MEDICAL CENTER | Age: 64
End: 2019-03-11

## 2019-03-11 NOTE — TELEPHONE ENCOUNTER
Called pt back and left message that update was received, and to please call back if she has questions or concerns.

## 2019-03-11 NOTE — TELEPHONE ENCOUNTER
"Patient called from [phone] stating that her \"polyp\" in her left armpit has \"shrunk down to a dime, 3/4 of its size\". She stated that this is because of \"my diet\" and further stated she is not eating sugar. She also stated her \"thighs are no longer hurting\". She stated she was given \"orders\" to call and update us regularly and so she is doing so.   "

## 2019-03-25 ENCOUNTER — TELEPHONE (OUTPATIENT)
Dept: HEMATOLOGY ONCOLOGY | Facility: MEDICAL CENTER | Age: 64
End: 2019-03-25

## 2019-03-25 DIAGNOSIS — J20.9 ACUTE BRONCHITIS, UNSPECIFIED ORGANISM: ICD-10-CM

## 2019-03-25 RX ORDER — ALBUTEROL SULFATE 90 UG/1
2 AEROSOL, METERED RESPIRATORY (INHALATION) EVERY 6 HOURS PRN
Qty: 8.5 G | Refills: 0 | Status: SHIPPED | OUTPATIENT
Start: 2019-03-25 | End: 2019-03-27 | Stop reason: SDUPTHER

## 2019-03-25 NOTE — TELEPHONE ENCOUNTER
Patient called requesting a refill of her inhaler medication since she is not able to get a hold of Dr. Dugan's office. I informed her we cannot refill it. I sent a message to Dr. Dugan via in [employer].     Patient states that her left breast bleeds. She informs me it is most likely due to the work she is doing outside. She is scheduled to be seen on April 8th.   Patient denies fevers or chills. She states this has been going on for years. She states that when she is outside doing yard work and bumps her breast area it will begin to bleed. She states it is not a big deal and she will not discontinue doing her yard work. She does not think it's necessary for a return call. She will see us in April.

## 2019-03-27 DIAGNOSIS — J20.9 ACUTE BRONCHITIS, UNSPECIFIED ORGANISM: ICD-10-CM

## 2019-03-27 RX ORDER — ALBUTEROL SULFATE 90 UG/1
2 AEROSOL, METERED RESPIRATORY (INHALATION) EVERY 6 HOURS PRN
Qty: 8.5 G | Refills: 2 | Status: SHIPPED | OUTPATIENT
Start: 2019-03-27 | End: 2019-09-09 | Stop reason: SDUPTHER

## 2019-03-27 NOTE — TELEPHONE ENCOUNTER
Was the patient seen in the last year in this department? Yes    Does patient have an active prescription for medications requested? Yes    Received Request Via: Pharmacy    No visits with results within 1 Year(s) from this visit.   Latest known visit with results is:   Hospital Outpatient Visit on 10/24/2017   Component Date Value   • WBC 10/24/2017 10.1    • RBC 10/24/2017 5.08    • Hemoglobin 10/24/2017 16.6*   • Hematocrit 10/24/2017 49.5*   • MCV 10/24/2017 97.4    • MCH 10/24/2017 32.7    • MCHC 10/24/2017 33.5*   • RDW 10/24/2017 53.9*   • Platelet Count 10/24/2017 391    • MPV 10/24/2017 11.0    • Iron 10/24/2017 119    • Total Iron Binding 10/24/2017 437    • % Saturation 10/24/2017 27    • Folate -Folic Acid 10/24/2017 >23.2    • Vitamin B12 -True Cobala* 10/24/2017 211    ]

## 2019-04-15 ENCOUNTER — APPOINTMENT (OUTPATIENT)
Dept: HEMATOLOGY ONCOLOGY | Facility: MEDICAL CENTER | Age: 64
End: 2019-04-15
Payer: MEDICAID

## 2019-05-20 ENCOUNTER — OFFICE VISIT (OUTPATIENT)
Dept: HEMATOLOGY ONCOLOGY | Facility: MEDICAL CENTER | Age: 64
End: 2019-05-20
Payer: MEDICAID

## 2019-05-20 VITALS
HEART RATE: 76 BPM | WEIGHT: 96.56 LBS | DIASTOLIC BLOOD PRESSURE: 74 MMHG | RESPIRATION RATE: 16 BRPM | OXYGEN SATURATION: 97 % | TEMPERATURE: 98.4 F | SYSTOLIC BLOOD PRESSURE: 136 MMHG | BODY MASS INDEX: 17.11 KG/M2

## 2019-05-20 DIAGNOSIS — M16.11 PRIMARY OSTEOARTHRITIS OF RIGHT HIP: ICD-10-CM

## 2019-05-20 DIAGNOSIS — Z17.0 MALIGNANT NEOPLASM OF NIPPLE OF LEFT BREAST IN FEMALE, ESTROGEN RECEPTOR POSITIVE (HCC): ICD-10-CM

## 2019-05-20 DIAGNOSIS — C50.012 MALIGNANT NEOPLASM OF NIPPLE OF LEFT BREAST IN FEMALE, ESTROGEN RECEPTOR POSITIVE (HCC): ICD-10-CM

## 2019-05-20 DIAGNOSIS — M81.0 OSTEOPOROSIS, UNSPECIFIED OSTEOPOROSIS TYPE, UNSPECIFIED PATHOLOGICAL FRACTURE PRESENCE: ICD-10-CM

## 2019-05-20 DIAGNOSIS — F15.11 HISTORY OF METHAMPHETAMINE ABUSE (HCC): ICD-10-CM

## 2019-05-20 PROCEDURE — 99214 OFFICE O/P EST MOD 30 MIN: CPT | Performed by: INTERNAL MEDICINE

## 2019-05-20 RX ORDER — ALENDRONATE SODIUM 70 MG/1
70 TABLET ORAL
Qty: 4 TAB | Refills: 3 | Status: SHIPPED | OUTPATIENT
Start: 2019-05-20 | End: 2019-08-15 | Stop reason: SDUPTHER

## 2019-05-20 ASSESSMENT — PAIN SCALES - GENERAL: PAINLEVEL: 10=SEVERE PAIN

## 2019-05-20 NOTE — PROGRESS NOTES
Date of visit: 5/20/2019  11:07 AM      Chief Complaint-  Neglected breast cancer, ER/SD positive, HER-2/lexis negative      Identification/Prior relevant history: Presented with left breast mass in 2010.  -She never sought any medical care for this with the understanding that she may have cancer.  -Her left breast is pretty much destroyed by the breast cancer by now.  -She was a no-show for multiple prior appointments.  -Had a biopsy done at Dr. Ingram's office  -Grade 2 invasive ductal carcinoma, ER strongly positive, PR10-20% , Ki-67 95%, HER-2/lexis negative  -8/23/18-establish care with me. Started on anastrozole.    -She reports good improvement in her breast wound, which has stopped draining. Improvement in the axillary adenopathy.  9/20/18-PET scan  -Large FDG avid left breast masses consistent with known primary malignancy.  2.  Prominent left axillary lymph nodes are FDG avid and consistent with metastatic disease.  3.   Focal activity in the left maxilla is likely inflammatory and related to dental disease.  4.  Right maxillary sinus disease with increased metabolic activity, likely inflammatory.  5.  Foci of activity in the spine are likely related to degenerative disease.    Interim history  -She reports compliance with Arimidex.  Her breast wound continues to improve.  Left axillary mass has also improved.  -Unfortunately significant issues with follow-up visits.  Sustained a fall in the interim and developed sacral pain.  Managed by her PCP.  Prior DEXA scan shows osteopenia.  She is reluctant to get scans in general.  Continues to have right hip pain from chronic osteoarthritis but    Past Medical History:      Past Medical History:   Diagnosis Date   • Anxiety    • Arthritis    • Cancer (HCC)    • ETOH abuse    • Osteoporosis    • Teeth decayed        Past surgical history:       Past Surgical History:   Procedure Laterality Date   • LACERATION REPAIR Right 6/18/2017    Procedure: REPAIR OF MULTIPLE  FLEXOR TENDONS LACERATIONS; REPAIR OF ULNAR NERVE LACERATION;  Surgeon: Mert Madsen M.D.;  Location: SURGERY [facility];  Service:    • IRRIGATION & DEBRIDEMENT GENERAL Right 6/18/2017    Procedure: IRRIGATION & DEBRIDEMENT GENERAL;  Surgeon: Mert Madsen M.D.;  Location: SURGERY [facility];  Service:    • WOUND CLOSURE GENERAL Right 6/18/2017    Procedure: WOUND CLOSURE GENERAL;  Surgeon: Mert Madsen M.D.;  Location: SURGERY [facility];  Service:        Allergies:       Versed    Medications:         Current Outpatient Prescriptions   Medication Sig Dispense Refill   • alendronate (FOSAMAX) 70 MG Tab Take 1 Tab by mouth every 7 days. 4 Tab 3   • Vitamins/Minerals Tab TAKE ONE TABLET BY MOUTH EVERY DAY 30 Tab 11   • clotrimazole-betamethasone (LOTRISONE) 1-0.05 % Cream APPLY 5 OZ TO AFFECTED AREA(S) (BACK OF NECK) TWO TIMES A DAY 90 g 4   • Calcium Carbonate-Vitamin D (CALCIUM 600 + D) 600-400 MG-UNIT Tab Take 1 Tab by mouth every day. 90 Tab 3   • ascorbic acid (ASCORBIC ACID) 500 MG Tab TAKE TWO TABLETS BY MOUTH EVERY  Tab 1   • anastrozole (ARIMIDEX) 1 MG Tab Take 1 Tab by mouth every day. 90 Tab 3   • ascorbic acid (ASCORBIC ACID) 500 MG Tab TAKE TWO TABLETS BY MOUTH EVERY  Tab 0   • Cholecalciferol (VITAMIN D3 PO) Take  by mouth.     • Multiple Vitamins-Minerals (MULTIVITAMIN ADULT PO) Take  by mouth.     • folic acid (FOLVITE) 1 MG Tab Take 1 Tab by mouth every day. 90 Tab 1   • Aspirin-Caffeine (ANACIN PO) Take  by mouth.     • albuterol 108 (90 Base) MCG/ACT Aero Soln inhalation aerosol Inhale 2 Puffs by mouth every 6 hours as needed for Shortness of Breath. 8.5 g 2   • vitamin E (VITAMIN E) 1000 UNIT Cap TAKE ONE CAPSULE BY MOUTH EVERY DAY (Patient not taking: Reported on 5/20/2019) 60 Cap 3     No current facility-administered medications for this visit.          Social History:     Social History     Social History   • Marital status: Single     Spouse name: N/A   •  Number of children: 1   • Years of education: N/A     Occupational History   • Not on file.     Social History Main Topics   • Smoking status: Current Every Day Smoker     Packs/day: 0.25     Years: 10.00     Types: Cigars   • Smokeless tobacco: Never Used      Comment: 3 cigars a day   • Alcohol use Yes      Comment: half pint/week   • Drug use: No      Comment: Many years ago   • Sexual activity: Not Currently     Other Topics Concern   • Not on file     Social History Narrative   • No narrative on file       Family History:      Family History   Problem Relation Age of Onset   • Arthritis Mother    • Heart Failure Mother    • Osteoporosis Mother    • Arthritis Father    • Heart Failure Father    • Heart Attack Father    • Heart Disease Father    • Hyperlipidemia Father    • Hypertension Father    • Cancer Paternal Aunt         Colon cancer       Review of Systems:  All other review of systems are negative except what was mentioned above in the HPI.    Constitutional: Negative for fever, chills, weight loss and malaise/fatigue.    HEENT: No new auditory or visual complaints. No sore throat and neck pain.     Respiratory: Negative for cough, sputum production, shortness of breath and wheezing.    Cardiovascular: Negative for chest pain, palpitations, orthopnea and leg swelling.    Gastrointestinal: Negative for heartburn, nausea, vomiting and abdominal pain.    Genitourinary: Negative for dysuria, hematuria    Musculoskeletal: As above  Skin: Negative for rash and itching.    Neurological: Negative for focal weakness and headaches.    Endo/Heme/Allergies: No abnormal bleed/bruise.    Psychiatric/Behavioral: No new depression/anxiety.    Physical Exam:  Vitals: /74 (BP Location: Right arm, Patient Position: Sitting, BP Cuff Size: Adult)   Pulse 76   Temp 36.9 °C (98.4 °F) (Temporal)   Resp 16   Wt 43.8 kg (96 lb 9 oz)   SpO2 97%   BMI 17.11 kg/m²     General: Not in acute distress, alert and oriented x  3  HEENT: No pallor, icterus. Oropharynx clear.   Neck: Supple, no palpable masses.  Lymph nodes: No palpable cervical, supraclavicular, axillary or inguinal lymphadenopathy.    CVS: regular rate and rhythm, no rubs or gallops  RESP: Clear to auscultate bilaterally, no wheezing or crackles.   ABD: Soft, non tender, non distended, positive bowel sounds, no palpable organomegaly.  She has some nonspecific sacral tenderness with prominent sacrum  EXT: No edema or cyanosis  CNS: Alert and oriented x3, No focal deficits.  Skin- No rash  -Significant improvement in the breast mass which has stopped draining and started developing eschar . She does not have much of breast tissue left over.  Significant improvement in left axillary adenopathy    Labs:   No visits with results within 1 Week(s) from this visit.   Latest known visit with results is:   Hospital Outpatient Visit on 10/24/2017   Component Date Value Ref Range Status   • WBC 10/24/2017 10.1  4.8 - 10.8 K/uL Final   • RBC 10/24/2017 5.08  4.20 - 5.40 M/uL Final   • Hemoglobin 10/24/2017 16.6* 12.0 - 16.0 g/dL Final   • Hematocrit 10/24/2017 49.5* 37.0 - 47.0 % Final   • MCV 10/24/2017 97.4  81.4 - 97.8 fL Final   • MCH 10/24/2017 32.7  27.0 - 33.0 pg Final   • MCHC 10/24/2017 33.5* 33.6 - 35.0 g/dL Final   • RDW 10/24/2017 53.9* 35.9 - 50.0 fL Final   • Platelet Count 10/24/2017 391  164 - 446 K/uL Final   • MPV 10/24/2017 11.0  9.0 - 12.9 fL Final   • Iron 10/24/2017 119  40 - 170 ug/dL Final   • Total Iron Binding 10/24/2017 437  250 - 450 ug/dL Final   • % Saturation 10/24/2017 27  15 - 55 % Final   • Folate -Folic Acid 10/24/2017 >23.2  >4.0 ng/mL Final   • Vitamin B12 -True Cobalamin 10/24/2017 211  211 - 911 pg/mL Final             Assessment and Plan:    Neglected breast Ca- T4N2 Mx ER/CA positive, HER-2/lexis negative.-She had the mass for around 7-8 years now and has not sought any medical care. Biopsies consistent with the estrogen receptor positive  phenotype as above.  Reviewed the PET scan images which show the large breast mass and axillary adenopathy. No convincing evidence of distant metastatic disease otherwise. She does have some uptake in the spine, however, degenerative changes are favored.    She continues to have significant improvement in the primary breast mass and the left axillary lymph node with more than 8 months of Arimidex.  I referred her to radiation oncology before however she is unable to keep appointments anywhere.    Would recommend a restaging PET scan after 3 months or so and if there is no other metastatic disease consider definitive therapy.Toilet mastectomy with axillary dissection can be considered down the nisa depending on her treatment response.  Instructed her to be compliant.    Osteopenia we will start her on Fosamax.  Recent fall with sacral pain-managed by PCP.  She has chronic severe osteoarthritis.  Discussed the option of getting a bone scan however she is reluctant to get scans at this point.    Return to clinic in 2 months        She agreed and verbalized  agreement and understanding with the current plan.  I answered all questions and concerns at this time         Please note that this dictation was created using voice recognition software. I have made every reasonable attempt to correct obvious errors, but I expect that there are errors of grammar and possibly content that I did not discover before finalizing the note.      SIGNATURES:  Bk Croft    CC:  Neelam Jalloh, P.A.-C.  No ref. provider found

## 2019-05-22 ENCOUNTER — TELEPHONE (OUTPATIENT)
Dept: HEMATOLOGY ONCOLOGY | Facility: MEDICAL CENTER | Age: 64
End: 2019-05-22

## 2019-05-22 NOTE — TELEPHONE ENCOUNTER
Patient called to let us know that she was able to get her medication Alendronate 70mg yesterday. She will call us if she has any further questions.

## 2019-05-23 ENCOUNTER — TELEPHONE (OUTPATIENT)
Dept: HEMATOLOGY ONCOLOGY | Facility: MEDICAL CENTER | Age: 64
End: 2019-05-23

## 2019-05-23 NOTE — TELEPHONE ENCOUNTER
Patient called stated that on her left breast she had a scab and now it is off and it did not bleed this is an update to give us because usually I would bleed.     For further questions we may contact Dougie at [phone].

## 2019-05-24 DIAGNOSIS — R21 RASH: ICD-10-CM

## 2019-05-25 RX ORDER — ASCORBIC ACID 500 MG
1000 TABLET ORAL
Qty: 60 TAB | Refills: 2 | Status: SHIPPED | OUTPATIENT
Start: 2019-05-25 | End: 2019-06-24

## 2019-05-25 NOTE — TELEPHONE ENCOUNTER
Was the patient seen in the last year in this department? Yes    Does patient have an active prescription for medications requested? Yes    Received Request Via: Pharmacy     Last Visit:05/20/2019  Last Lab:10/24/2017

## 2019-06-17 ENCOUNTER — TELEPHONE (OUTPATIENT)
Dept: HEMATOLOGY ONCOLOGY | Facility: MEDICAL CENTER | Age: 64
End: 2019-06-17

## 2019-06-17 NOTE — TELEPHONE ENCOUNTER
"Patient contacted our office to inform Dr. de la torre her right leg sometimes \"give out\" on her left leg. She also wants to confirm her next appointment. I informed her Dr. De La Torre has gone back to [facility] and is no longer seeing patients out of this office. Patient states she does not have time for this and disconnected the call.   "

## 2019-06-18 ENCOUNTER — TELEPHONE (OUTPATIENT)
Dept: HEMATOLOGY ONCOLOGY | Facility: MEDICAL CENTER | Age: 64
End: 2019-06-18

## 2019-06-18 NOTE — TELEPHONE ENCOUNTER
Patient called in asking who her new Oncologist is. I informed her that we are still in transition to find a permanent Oncologist. Patient stated that she will be transferring care to [facility], I gave her their contact information and address.   Patient will be calling them to set up and appointment.

## 2019-06-24 ENCOUNTER — OFFICE VISIT (OUTPATIENT)
Dept: MEDICAL GROUP | Facility: CLINIC | Age: 64
End: 2019-06-24
Payer: MEDICAID

## 2019-06-24 VITALS
SYSTOLIC BLOOD PRESSURE: 120 MMHG | DIASTOLIC BLOOD PRESSURE: 80 MMHG | WEIGHT: 97 LBS | TEMPERATURE: 99.2 F | HEIGHT: 63 IN | HEART RATE: 120 BPM | RESPIRATION RATE: 12 BRPM | BODY MASS INDEX: 17.19 KG/M2 | OXYGEN SATURATION: 97 %

## 2019-06-24 DIAGNOSIS — F15.11 HISTORY OF METHAMPHETAMINE ABUSE (HCC): ICD-10-CM

## 2019-06-24 DIAGNOSIS — F17.200 CURRENT EVERY DAY SMOKER: ICD-10-CM

## 2019-06-24 DIAGNOSIS — Z17.0 MALIGNANT NEOPLASM OF NIPPLE OF LEFT BREAST IN FEMALE, ESTROGEN RECEPTOR POSITIVE (HCC): ICD-10-CM

## 2019-06-24 DIAGNOSIS — R48.8 ECHOLALIA: ICD-10-CM

## 2019-06-24 DIAGNOSIS — M16.11 PRIMARY OSTEOARTHRITIS OF RIGHT HIP: ICD-10-CM

## 2019-06-24 DIAGNOSIS — R46.89 ABNORMAL BEHAVIOR: ICD-10-CM

## 2019-06-24 DIAGNOSIS — C50.012 MALIGNANT NEOPLASM OF NIPPLE OF LEFT BREAST IN FEMALE, ESTROGEN RECEPTOR POSITIVE (HCC): ICD-10-CM

## 2019-06-24 PROBLEM — Z20.828 EXPOSURE TO MEASLES VIRUS: Status: RESOLVED | Noted: 2019-02-20 | Resolved: 2019-06-24

## 2019-06-24 PROBLEM — Z00.00 ENCOUNTER FOR MEDICAL EXAMINATION TO ESTABLISH CARE: Status: RESOLVED | Noted: 2019-02-20 | Resolved: 2019-06-24

## 2019-06-24 PROCEDURE — 99213 OFFICE O/P EST LOW 20 MIN: CPT | Performed by: PHYSICIAN ASSISTANT

## 2019-06-24 ASSESSMENT — PATIENT HEALTH QUESTIONNAIRE - PHQ9: CLINICAL INTERPRETATION OF PHQ2 SCORE: 0

## 2019-06-24 NOTE — ASSESSMENT & PLAN NOTE
This is a chronic issue which has been addressed multiple times in the past.  Patient was scheduled for surgery but was unable to get transportation to the surgery so is requesting new orthopedic referral at this time.

## 2019-06-24 NOTE — ASSESSMENT & PLAN NOTE
Near the end of every sentence spoken by this provider this patient repeats the last 4-5 words, sometimes rephrasing these words.

## 2019-06-24 NOTE — PROGRESS NOTES
Chief Complaint   Patient presents with   • Hip Pain     [id number] hip pain       HISTORY OF PRESENT ILLNESS: Patient is a 64 y.o. female established patient who presents today for evaluation and management of:    Abnormal behavior  Patient has very strange demeanor. See echolalia note. She seems somewhat paranoid, is unkempt, and makes frequent, inappropriate hand, body and head gestures.     Current every day smoker  Patient continues to smoke despite cancer diagnoses.     Echolalia  Near the end of every sentence spoken by this provider this patient repeats the last 4-5 words, sometimes rephrasing these words.     History of methamphetamine abuse  Patient continues to deny recent methamphetamine use today. See abnormal behavior note.      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 Croft, due for recheck in about amonth, per patient. She does have ow grade fever, high pulse and recent weight loss today.    Osteoarthritis of right hip  This is a chronic issue which has been addressed multiple times in the past.  Patient was scheduled for surgery but was unable to get transportation to the surgery so is requesting new orthopedic referral at this time.       Patient Active Problem List    Diagnosis Date Noted   • Financial difficulties 06/22/2017     Priority: High   • Osteoarthritis of right hip 06/20/2017     Priority: Medium   • Malignant neoplasm of nipple of left breast in female (HCC) 06/18/2017     Priority: Low   • Current every day smoker 02/20/2019   • Acute bronchitis 02/20/2019   • Osteoporosis 02/20/2019   • Vitamin E deficiency 02/20/2019   • Abnormal behavior 02/20/2019   • Echolalia 02/20/2019   • History of ETOH abuse 08/21/2017   • History of methamphetamine abuse 08/21/2017       Allergies:Versed    Current Outpatient Prescriptions   Medication Sig Dispense Refill   • alendronate (FOSAMAX) 70 MG Tab Take 1 Tab by mouth every 7 days. 4 Tab 3   •  Vitamins/Minerals Tab TAKE ONE TABLET BY MOUTH EVERY DAY 30 Tab 11   • Calcium Carbonate-Vitamin D (CALCIUM 600 + D) 600-400 MG-UNIT Tab Take 1 Tab by mouth every day. 90 Tab 3   • anastrozole (ARIMIDEX) 1 MG Tab Take 1 Tab by mouth every day. 90 Tab 3   • ascorbic acid (ASCORBIC ACID) 500 MG Tab TAKE TWO TABLETS BY MOUTH EVERY  Tab 0   • Cholecalciferol (VITAMIN D3 PO) Take  by mouth.     • Multiple Vitamins-Minerals (MULTIVITAMIN ADULT PO) Take  by mouth.     • vitamin E (VITAMIN E) 1000 UNIT Cap TAKE ONE CAPSULE BY MOUTH EVERY DAY 60 Cap 3   • folic acid (FOLVITE) 1 MG Tab Take 1 Tab by mouth every day. 90 Tab 1   • Aspirin-Caffeine (ANACIN PO) Take  by mouth.     • albuterol 108 (90 Base) MCG/ACT Aero Soln inhalation aerosol Inhale 2 Puffs by mouth every 6 hours as needed for Shortness of Breath. 8.5 g 2     No current facility-administered medications for this visit.        Social History   Substance Use Topics   • Smoking status: Current Every Day Smoker     Packs/day: 0.25     Years: 10.00     Types: Cigars   • Smokeless tobacco: Never Used      Comment: 3 cigars a day   • Alcohol use 14.4 oz/week     24 Shots of liquor per week       Family Status   Relation Status   • Mo    • Fa    • PAunt Alive     Family History   Problem Relation Age of Onset   • Arthritis Mother    • Heart Failure Mother    • Osteoporosis Mother    • Arthritis Father    • Heart Failure Father    • Heart Attack Father    • Heart Disease Father    • Hyperlipidemia Father    • Hypertension Father    • Cancer Paternal Aunt         Colon cancer       Review of Systems: See HPI above.   Constitutional: Negative for fever, chills, weight loss and malaise.   HENT: Negative for ear pain, nosebleeds, congestion, sore throat and neck pain.    Eyes: Negative for blurred vision.   Respiratory: Negative for shortness of breath, cough, sputum production and wheezing.    Cardiovascular: Negative for chest pain, palpitations,  "orthopnea and leg swelling.   Gastrointestinal: Negative for heartburn, nausea, vomiting and abdominal pain.   Genitourinary: Negative for dysuria, urgency and frequency.   Musculoskeletal: Negative for myalgias, back pain and positive for hip pain.       Exam:  /80 (BP Location: Right arm, Patient Position: Sitting, BP Cuff Size: Adult)   Pulse (!) 120   Temp 37.3 °C (99.2 °F)   Resp 12   Ht 1.6 m (5' 3\")   Wt 44 kg (97 lb)   SpO2 97%   Body mass index is 17.18 kg/m².  General:  Thin female in NAD  Head: is grossly normal.  Neck: Supple without masses. Thyroid is not visibly enlarged.  Pulmonary:  Normal effort.  Cardiovascular:  Carotid pulses are intact and equal bilaterally.  Extremities: no clubbing, cyanosis, or edema.  Musculoskeletal: Patient has a very difficult time walking as she is unable to rest any weight on her right hip.  This is much worse since her last visit.    Medical decision-making and discussion:  1. Primary osteoarthritis of right hip  Strongly advised to have orthopedic follow-up at this time.  - REFERRAL TO ORTHOPEDICS    2. Echolalia      3. History of methamphetamine abuse  Encouraged to remain sober.    4. Malignant neoplasm of nipple of left breast in female, estrogen receptor positive (HCC)  Encouraged to follow-up with oncology, especially in light of current abnormal vital signs.    5. Abnormal behavior      6. Current every day smoker  Strongly encouraged to quit smoking due to cancer diagnosis among other comorbidities.      Please note that this dictation was created using voice recognition software. I have made every reasonable attempt to correct obvious errors, but I expect that there are errors of grammar and possibly content that I did not discover before finalizing the note.      No Follow-up on file.  "

## 2019-06-24 NOTE — ASSESSMENT & PLAN NOTE
Followed by Dr. Croft, due for recheck in about amonth, per patient. She does have ow grade fever, high pulse and recent weight loss today.

## 2019-06-24 NOTE — ASSESSMENT & PLAN NOTE
Patient has very strange demeanor. See echolalia note. She seems somewhat paranoid, is unkempt, and makes frequent, inappropriate hand, body and head gestures.

## 2019-07-22 PROBLEM — C50.912 BREAST CANCER, STAGE 3, LEFT (HCC): Status: ACTIVE | Noted: 2017-06-18

## 2019-07-22 NOTE — PROGRESS NOTES
"07/22/19    Subjective    Chief Complaint:  64 female f/u left ER(+) HER-2(-) T4N2MX neglected breast cancer on Arimidex    HPI:  BX by Dr. Ingram 7/12/2018 grade 2 ER/MS(+)HER-2(-) left breast mass that had been present since 2010. Ki67 95%. Saw Guerda 8/23/2018 and started on Arimidex. PET was positive in axillary nodes but no distant mets. Has never seen XRT. Lives in [place].     ROS:Nose runs. Eyes blurry. Smoker's cough. No heart trouble. No GI or  issues except calcium pills constipate. Right hip DJD. Right knee hurts as well.     PMH:    Allergies   Allergen Reactions   • Versed Nausea       Medications:    Current Outpatient Prescriptions on File Prior to Visit   Medication Sig Dispense Refill   • alendronate (FOSAMAX) 70 MG Tab Take 1 Tab by mouth every 7 days. 4 Tab 3   • Vitamins/Minerals Tab TAKE ONE TABLET BY MOUTH EVERY DAY 30 Tab 11   • albuterol 108 (90 Base) MCG/ACT Aero Soln inhalation aerosol Inhale 2 Puffs by mouth every 6 hours as needed for Shortness of Breath. 8.5 g 2   • Multiple Vitamins-Minerals (MULTIVITAMIN ADULT PO) Take  by mouth.     • vitamin E (VITAMIN E) 1000 UNIT Cap TAKE ONE CAPSULE BY MOUTH EVERY DAY 60 Cap 3   • Calcium Carbonate-Vitamin D (CALCIUM 600 + D) 600-400 MG-UNIT Tab Take 1 Tab by mouth every day. (Patient not taking: Reported on 7/23/2019) 90 Tab 3   • ascorbic acid (ASCORBIC ACID) 500 MG Tab TAKE TWO TABLETS BY MOUTH EVERY  Tab 0   • Cholecalciferol (VITAMIN D3 PO) Take  by mouth.     • folic acid (FOLVITE) 1 MG Tab Take 1 Tab by mouth every day. 90 Tab 1   • Aspirin-Caffeine (ANACIN PO) Take  by mouth.       No current facility-administered medications on file prior to visit.      SH  Smokes - cigars  - 1 pack a week  ETOH - 2 oz vodka a day  Meth in past    Objective    Vitals:    /76 (BP Location: Right arm, Patient Position: Sitting, BP Cuff Size: Small adult)   Pulse 80   Temp 36.9 °C (98.5 °F) (Temporal)   Resp 16   Ht 1.6 m (5' 3\")   Wt " 42.5 kg (93 lb 12.9 oz)   SpO2 97%   BMI 16.62 kg/m²     Physical Exam: Thin, chronically ill appearing    HEENT - PERRL. Poor dentition  Neck - supple  Node - left axillary fixed node. Patient says smaller than before  Chest - clear  Breasts - right normal. Left auto-digested  COR - RSR  Abd - Large liver down 6 - 8 cm and across midline  Ext - No edema. Decrease range motion right hip. Right knee slightly swollen.   Skin - no rash    Labs:  None recent    Assessment    Imp:    Visit Diagnosis:    1. Breast cancer, stage 3, left (HCC)           Plan:    Really needs XRT consult  Needs CBC, CMP  PET prior to next visit    Mert Whiting M.D.

## 2019-07-23 ENCOUNTER — OFFICE VISIT (OUTPATIENT)
Dept: HEMATOLOGY ONCOLOGY | Facility: MEDICAL CENTER | Age: 64
End: 2019-07-23
Payer: MEDICAID

## 2019-07-23 VITALS
OXYGEN SATURATION: 97 % | RESPIRATION RATE: 16 BRPM | SYSTOLIC BLOOD PRESSURE: 120 MMHG | DIASTOLIC BLOOD PRESSURE: 76 MMHG | HEART RATE: 80 BPM | HEIGHT: 63 IN | BODY MASS INDEX: 16.62 KG/M2 | TEMPERATURE: 98.5 F | WEIGHT: 93.81 LBS

## 2019-07-23 DIAGNOSIS — C50.912 BREAST CANCER, STAGE 3, LEFT (HCC): ICD-10-CM

## 2019-07-23 PROCEDURE — 99213 OFFICE O/P EST LOW 20 MIN: CPT | Performed by: INTERNAL MEDICINE

## 2019-07-23 RX ORDER — ANASTROZOLE 1 MG/1
1 TABLET ORAL DAILY
Qty: 90 TAB | Refills: 3 | Status: SHIPPED | OUTPATIENT
Start: 2019-07-23 | End: 2020-01-06 | Stop reason: SDUPTHER

## 2019-07-23 ASSESSMENT — PAIN SCALES - GENERAL: PAINLEVEL: 10=SEVERE PAIN

## 2019-07-24 ENCOUNTER — PATIENT OUTREACH (OUTPATIENT)
Dept: OTHER | Facility: MEDICAL CENTER | Age: 64
End: 2019-07-24

## 2019-08-15 ENCOUNTER — TELEPHONE (OUTPATIENT)
Dept: HEMATOLOGY ONCOLOGY | Facility: MEDICAL CENTER | Age: 64
End: 2019-08-15

## 2019-08-15 DIAGNOSIS — M16.11 PRIMARY OSTEOARTHRITIS OF RIGHT HIP: ICD-10-CM

## 2019-08-15 RX ORDER — ALENDRONATE SODIUM 70 MG/1
70 TABLET ORAL
Qty: 4 TAB | Refills: 3 | Status: ON HOLD
Start: 2019-08-15 | End: 2019-12-23

## 2019-08-15 NOTE — TELEPHONE ENCOUNTER
Dougie called she stated that she would like to know if she should continue taking Folic Acid?  Dougie may be contacted at [phone].     17-Oct-2018

## 2019-08-15 NOTE — TELEPHONE ENCOUNTER
Returned pt's call and enc pt to contact PCP who prescribed the folic acid.  Pt then asked if she should continue the iron and calcium.  She then stated that she is no longer taking the iron.  She went on to ask where she could buy a wheel chair.  Enc pt to make appt with PCP to ask all of the above questions as a PCP could help with all of these.  Pt also stated that the two lumps in the armpit were getting smaller and specifically requested that this be noted in the chart.  Pt spoke a little more about her home and community.  RN again enc pt to set up appointment with PCP.  Pt indicated no additional questions or concerns and thanked RN for returning call.

## 2019-08-15 NOTE — TELEPHONE ENCOUNTER
Was the patient seen in the last year in this department? Yes    Does patient have an active prescription for medications requested? No     Received Request Via: Pharmacy     Received fax from [facility] requesting authorization refill for Alendronate 70 Mg Tab. Order is in patient .

## 2019-08-19 ENCOUNTER — TELEPHONE (OUTPATIENT)
Dept: MEDICAL GROUP | Facility: PHYSICIAN GROUP | Age: 64
End: 2019-08-19

## 2019-08-19 NOTE — TELEPHONE ENCOUNTER
I don't see a reason why this patient shouldn't take folic acid in small doses over the counter but she should ask her oncologist before starting this supplement.

## 2019-08-21 NOTE — TELEPHONE ENCOUNTER
Phone Number Called: [phone]    Call outcome: left message for patient to call back regarding message below    Message: Message left for pt per provider instructions below.

## 2019-08-29 ENCOUNTER — TELEPHONE (OUTPATIENT)
Dept: HEMATOLOGY ONCOLOGY | Facility: MEDICAL CENTER | Age: 64
End: 2019-08-29

## 2019-08-29 NOTE — TELEPHONE ENCOUNTER
Patient called to say that left breast is bleeding when the scab comes off. Lost contact with patient when she was put on hold. Attempted to call her back to get more info but was sent to voicemail.

## 2019-08-29 NOTE — TELEPHONE ENCOUNTER
Left message for patient to return call in regards to her phone call this morning to get more information.

## 2019-09-03 NOTE — TELEPHONE ENCOUNTER
2nd attempt    Left message for patient to return call in regards to her phone call on Thursday 8/29/19 to get more information.

## 2019-09-04 ENCOUNTER — TELEPHONE (OUTPATIENT)
Dept: HEMATOLOGY ONCOLOGY | Facility: MEDICAL CENTER | Age: 64
End: 2019-09-04

## 2019-09-04 NOTE — TELEPHONE ENCOUNTER
Patient called she stated that in regards to her breast she is fine now she stated that for the reason why she was bleeding was because she was told to put ointment on the area that is itching and she did but the scab came off and that is why she was bleeding. What she did to take care of it was that she used 4x4 gauze and tape that was what help the bleeding to stop. Call was then transferred to Philomena.

## 2019-09-04 NOTE — TELEPHONE ENCOUNTER
Patient called to talk about blood work that needs to be done before the next appointment. She wants to get it done at [facility]. Patient also stated that she's waiting to hear about another appointment with Dr. Whiting. Patient thinks that she needs a PET scan before appointment. Someone called her but she no longer has the information. Advised the patient to return calls regarding medical appointments.

## 2019-09-04 NOTE — TELEPHONE ENCOUNTER
Pt stated that bleeding has stopped.  Pt was again referred to PCP re folic acid.  Pt confirmed that scab has healed and there is no further bleeding.  Pt indicated no other questions or concerns.

## 2019-09-05 ENCOUNTER — TELEPHONE (OUTPATIENT)
Dept: HEMATOLOGY ONCOLOGY | Facility: MEDICAL CENTER | Age: 64
End: 2019-09-05

## 2019-09-05 NOTE — TELEPHONE ENCOUNTER
Patient called she stated that she would like to know when her appointment is scheduled for? I asked what appointment and she said she thought she was suppose get a call in regards to her appointment that we scheduled for her. I told her I do not see an appointment but

## 2019-09-09 DIAGNOSIS — J20.9 ACUTE BRONCHITIS, UNSPECIFIED ORGANISM: ICD-10-CM

## 2019-09-09 RX ORDER — ALBUTEROL SULFATE 90 MCG
HFA AEROSOL WITH ADAPTER (GRAM) INHALATION
Qty: 8.5 G | Refills: 1 | Status: ON HOLD
Start: 2019-09-09 | End: 2019-12-23

## 2019-09-09 NOTE — TELEPHONE ENCOUNTER
Was the patient seen in the last year in this department? Yes    Does patient have an active prescription for medications requested? Yes    Received Request Via: Pharmacy     Last visit  6-24-19    Last lab   10-24-17

## 2019-09-11 ENCOUNTER — TELEPHONE (OUTPATIENT)
Dept: HEMATOLOGY ONCOLOGY | Facility: MEDICAL CENTER | Age: 64
End: 2019-09-11

## 2019-09-11 NOTE — TELEPHONE ENCOUNTER
Patient called and stated that her left breast won't stop bleeding when the scab comes off. Patient states that there is no pain. Patient needs to know a week prior if she needs to come in so that she can arrange medical transport.

## 2019-09-11 NOTE — TELEPHONE ENCOUNTER
Pt called and stated that her scab continues to occasionally bleed and she would like a surgeon to remove her breast.  Pt stated that she placed ointment on the scab per Dr. Whiting.  Pt stated that it continues to oooze, and it is oozing enough that she cannot leave it open to air.  Pt remembered that Dr. Ingram was her surgeon and stated that she will plan to contact her office to follow up with her.

## 2019-09-18 ENCOUNTER — TELEPHONE (OUTPATIENT)
Dept: HEMATOLOGY ONCOLOGY | Facility: MEDICAL CENTER | Age: 64
End: 2019-09-18

## 2019-09-18 NOTE — TELEPHONE ENCOUNTER
I called left message for the patient to return my call regarding her CT scan. When she returns my call please schedule her a follow up with Dr Whiting post her CT scan.Thank you

## 2019-10-11 ENCOUNTER — HOSPITAL ENCOUNTER (OUTPATIENT)
Dept: RADIOLOGY | Facility: MEDICAL CENTER | Age: 64
End: 2019-10-11
Attending: INTERNAL MEDICINE
Payer: MEDICAID

## 2019-10-18 NOTE — PROGRESS NOTES
10/23/19    Subjective    Chief Complaint:  64 female f/u left ER(+)HER-2(-) T4N2 neglected breast cancer on Arimidex    HPI:  7/12/18 bx by Dr. Ingram left breast mass that had been present since 2010. Saw Dr. Croft 8/23/18 and started on Arimidex. PET was positive only in axilla. Was referred to XRT but never went. Lives in [place], [place].     ROS:    Constitutional:  Skin:  HENT:  Cardiovascular:  Respiratory:  GI:  :  Musculoskeletal:  Neuro:  Psych:    PMH:    Allergies   Allergen Reactions   • Versed Nausea       Medications:    Current Outpatient Medications on File Prior to Visit   Medication Sig Dispense Refill   • PROVENTIL  (90 Base) MCG/ACT Aero Soln inhalation aerosol INHALE 2 PUFFS BY MOUTH EVERY 6 HOURS AS NEEDED FOR SHORTNESS OF BREATH. 8.5 g 1   • alendronate (FOSAMAX) 70 MG Tab Take 1 Tab by mouth every 7 days. 4 Tab 3   • anastrozole (ARIMIDEX) 1 MG Tab Take 1 Tab by mouth every day. 90 Tab 3   • Vitamins/Minerals Tab TAKE ONE TABLET BY MOUTH EVERY DAY 30 Tab 11   • Calcium Carbonate-Vitamin D (CALCIUM 600 + D) 600-400 MG-UNIT Tab Take 1 Tab by mouth every day. (Patient not taking: Reported on 7/23/2019) 90 Tab 3   • ascorbic acid (ASCORBIC ACID) 500 MG Tab TAKE TWO TABLETS BY MOUTH EVERY  Tab 0   • Cholecalciferol (VITAMIN D3 PO) Take  by mouth.     • Multiple Vitamins-Minerals (MULTIVITAMIN ADULT PO) Take  by mouth.     • vitamin E (VITAMIN E) 1000 UNIT Cap TAKE ONE CAPSULE BY MOUTH EVERY DAY 60 Cap 3   • folic acid (FOLVITE) 1 MG Tab Take 1 Tab by mouth every day. 90 Tab 1   • Aspirin-Caffeine (ANACIN PO) Take  by mouth.       No current facility-administered medications on file prior to visit.          Objective    Vitals:    There were no vitals taken for this visit.    Physical Exam:    Appears well-developed and well-nourished. No distress.    Head -  Normocephalic .   Eyes - Pupils are equal, round, and reactive to light. Conjunctivae and EOM are normal. No scleral icterus.    Ears - normal hearing  Mouth - Throat: Oropharynx is clear and moist. No oropharyngeal exudate  Neck - Normal range of motion. Neck supple. No thyromegaly  Cardiovascular - Normal rate, regular rhythm, normal heart sounds and intact distal pulses. No  gallop, murmur or rub  Pulmonary - Normal breath sounds.  No wheeze, rales or rhonci  Breast - symmetrical. No mass on indentation.  Abdominal -Soft. No distension, tenderness, organomegaly or mass  Extremities-  No edema or tenderness.    Nodes - No submental, submandibular, preauricular, cervical, axillary or inguinal adenopathy.    Neurological -   Alert and oriented to person, place, and time. No focal findings  Skin - Skin is warm and dry. No rash noted. Not diaphoretic. No erythema. No pallor. No jaundice   Psychiatric -  Normal mood and affect.    Labs:  ***    Assessment    Imp:    Visit Diagnosis:  No diagnosis found.      Plan:      Mert Whiting M.D.

## 2019-10-22 ENCOUNTER — TELEPHONE (OUTPATIENT)
Dept: HEMATOLOGY ONCOLOGY | Facility: MEDICAL CENTER | Age: 64
End: 2019-10-22

## 2019-10-22 NOTE — TELEPHONE ENCOUNTER
Able to get patient's PET CT rescheduled to 11/5/19 check in at 12:00 pm at [address].   Pt is scheduled to see Henok on 11/7/19 at 10:30.

## 2019-10-22 NOTE — TELEPHONE ENCOUNTER
Called patient to reschedule her office visit with Dr. Whiting because it was scheduled after her CT.   Patient stated that she was kicked out of her house and that she is currently trying to find another place to live.   Informed patient that I am going to cancel her CT and her appointment with Dr. Whiting.   Patient agreed and stated that she will call us back to schedule her CT and appt in our office.     When pt calls back, please schedule her CT and office visit the same day 3 hours later with a STAT read on the CT results.

## 2019-10-23 ENCOUNTER — APPOINTMENT (OUTPATIENT)
Dept: RADIOLOGY | Facility: MEDICAL CENTER | Age: 64
End: 2019-10-23
Attending: INTERNAL MEDICINE
Payer: MEDICAID

## 2019-10-23 ENCOUNTER — APPOINTMENT (OUTPATIENT)
Dept: HEMATOLOGY ONCOLOGY | Facility: MEDICAL CENTER | Age: 64
End: 2019-10-23
Payer: MEDICAID

## 2019-11-01 ENCOUNTER — TELEPHONE (OUTPATIENT)
Dept: HEMATOLOGY ONCOLOGY | Facility: MEDICAL CENTER | Age: 64
End: 2019-11-01

## 2019-11-01 NOTE — TELEPHONE ENCOUNTER
"Returned pt's call.  [place] Pt stated that her R hip, hand, foot and head hurts.  Pt became tearful when talking about her pain.  Pt stated that she is taking her Arimidex and \"margot calcium/ alendronate\" medication. Pt then went on to stated that she is living with a friend.  RN suggested shelter in [place], but she stated that she will not leave her dog.  Pt then stated that Yuniel, her , has been called but was not available.  Pt continued to repeat herself that she has been locked out of her house, but then also repeated twice that she was being given $1000 to get out of the house by this weekend.  She stated that she was concerned that she is suicidal, but could not verbalize a plan.  She then stated, \"I'm not going to kill myself\" several time during the remainder of the the conversation. Pt stated that when she has a forwarding address, she will call us.  She asked if RN could call her if other resources become available.   "

## 2019-11-01 NOTE — TELEPHONE ENCOUNTER
"Patient called in to say that she has been kicked out of her house and that what we have on file is no longer current. Dougie stated that she is doing well but she also mentioned having \"brain bleeds and bloody noses.\"  Dougie said that she is still on anastrozole & \"alendrolate.\"   [place] and was also kicked out of a motel.   Patient said that she has been struggling mentally. Dougie said that she had spoke with her  recently. Right now she is at [place] staying with a friend.   After attempting to inform the patient about her upcoming appts, she said she was going to have to \"blow them all off.\" After placing Dougie on hold and attempting to transfer her to a RN, she hung up.     "

## 2019-11-05 ENCOUNTER — APPOINTMENT (OUTPATIENT)
Dept: RADIOLOGY | Facility: MEDICAL CENTER | Age: 64
End: 2019-11-05
Attending: INTERNAL MEDICINE
Payer: MEDICAID

## 2019-11-05 ENCOUNTER — PATIENT OUTREACH (OUTPATIENT)
Dept: OTHER | Facility: MEDICAL CENTER | Age: 64
End: 2019-11-05

## 2019-11-06 ENCOUNTER — TELEPHONE (OUTPATIENT)
Dept: HEMATOLOGY ONCOLOGY | Facility: MEDICAL CENTER | Age: 64
End: 2019-11-06

## 2019-11-08 ENCOUNTER — PATIENT OUTREACH (OUTPATIENT)
Dept: OTHER | Facility: MEDICAL CENTER | Age: 64
End: 2019-11-08

## 2019-11-08 NOTE — PROGRESS NOTES
Oncology Nurse Navigation  Spoke with Dr. Whiting who states pt was a no show for her appt yesterday.  Phoned for follow up.  No answer, left message. XUAN made Oncology Social Worker RAMIN Oden aware.

## 2019-11-12 ENCOUNTER — TELEPHONE (OUTPATIENT)
Dept: HEMATOLOGY ONCOLOGY | Facility: MEDICAL CENTER | Age: 64
End: 2019-11-12

## 2019-11-12 NOTE — TELEPHONE ENCOUNTER
Call placed to Rc from [employer] to clarify that patient is a type II diabetic that is insulin dependent; detailed message left; advised to call with any further questions/concerns.   Patient called our office today to let us know that she is still alive and she will be kicked out of her place at 5:00 pm. I placed patient on hold to get her nurse navigator on the phone for her.    I let patient know that her nurse navigator was coming to my station to speak to her on the phone. Patient continued to ramble and repeat that she was going to kill herself, but she was not actually going to do it.     Per patients nurse navigator I asked the  to come speak to the patient on the phone as well.

## 2019-11-14 ENCOUNTER — PATIENT OUTREACH (OUTPATIENT)
Dept: OTHER | Facility: MEDICAL CENTER | Age: 64
End: 2019-11-14

## 2019-11-14 ENCOUNTER — TELEPHONE (OUTPATIENT)
Dept: HEMATOLOGY ONCOLOGY | Facility: MEDICAL CENTER | Age: 64
End: 2019-11-14

## 2019-11-14 NOTE — TELEPHONE ENCOUNTER
Patient called to check in and state she was doing okay. 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 She stated her left breast was bleeding and oozing and I transferred her to the RN for further assessment.

## 2019-11-14 NOTE — PROGRESS NOTES
"On November 12, 2019, Oncology Social Worker Josselyn Oden received notification from [facility] Medical Assistant Baldo Pool of pt. call. Pt. had called OM and request was for LYNN Oden to respond.  LYNN Oden spoke with pt. who stated the  were coming back at 5 pm to \"throw me out.\"  It was very difficult to follow the conversation as pt. was tangential and speech was slurred and/or accelerated at times throughout encounter.  Pt. reports there are \"missing black pearls and that's why I am getting kicked out.\"  Pt. proceeded to share she had been living with Mert Schrader but her  [place] had made a visit and found the place to be \"disgusting so she got me a place to stay.\"  Pt. gave phone to Mert Schrader during conversation with LYNN Oden with permission.  Mert Schrader reported he would be able to \"put her up for a couple of nights because she has no other place to go.\"  LYNN Oden asked for phone to be given to pt.  LYNN Oden informed pt. she needed to contact Adult  [place] once she got off the phone as DENA East is awaiting her call to figure out a plan.  Pt. agreed to do so.  Pt. agreed she would contact LYNN Oden after speaking with DENA East.        "

## 2019-11-14 NOTE — PROGRESS NOTES
On November 12, 2019, Oncology Social Worker Josselyn Oden and Oncology Nurse Navigator Bren Mccollum spoke with APS  [place].  DENA East reported she has been working with pt.  Pt. was provided emergency housing on October 8th due to the condition of the home where she was living.  [facility] pt[facility] at [facility] but states pt. is adamant about wanting to live in [place].  DENA East informed LYNN Oden and XUAN Mccollum pt. has an eviction notice and also was locked out of [facility] due to non-payment.  DENA East shared the place she visited in [place] had vodka bottles throughout and there was suspicion of alcohol and drug abuse.  DENA East will be reaching out to the MOST team to assist as pt. had made some statements to her regarding possible suicide ideation.

## 2019-11-15 NOTE — TELEPHONE ENCOUNTER
"Call was transferred to LETICIA Abraham, who placed pt on speaker.  Leigh asked pt questions re bleeding. Pt stated, \"I'm fine.\"  Leigh asked pt why she called, and pt then would digress to the current living situation with mice and the smell of pot. Leigh again asked pt about bleeding, but pt would continue to digress. Pt stated that the smell of pot makes her vomit, and then talked about how [place] is [place] without a blanket.  When pt was asked if she wanted to come in and see a provider, pt stated that she will make an appointment when she has all of her other stuff taken care of.  She also stated to tell Dr. Ingram that she will come in when she is ready.  Placed pt on hold to attempt to contact Oncology Social Worker and/or Nurse Navigator, and was able to speak with Nurse Navigator who confirmed that pt's current best resource is her state , [place].  Explained this to pt and encouraged her to contact [place] for resources.    "

## 2019-11-19 NOTE — PROGRESS NOTES
Oncology Nurse Navigation  Received call from RUDI Bland with [facility] stating pt was tearful on the phone and requested ONN speak with the pt.  Pt was very tangetial during the encounter.  She stated she was to be evicted at 5pm.  She described unsanitary living conditions though it remained unclear as to where the pt was residing.  She stated she had been working with Sylvie East at the [facility].  Transferred call to Oncology Social Worker RAMIN Oden to assist in gleaning more information from the pt as she was unable to provide ONN with more details on her circumstances.

## 2019-12-09 ENCOUNTER — TELEPHONE (OUTPATIENT)
Dept: HEMATOLOGY ONCOLOGY | Facility: MEDICAL CENTER | Age: 64
End: 2019-12-09

## 2019-12-16 ENCOUNTER — TELEPHONE (OUTPATIENT)
Dept: HEMATOLOGY ONCOLOGY | Facility: MEDICAL CENTER | Age: 64
End: 2019-12-16

## 2019-12-16 NOTE — TELEPHONE ENCOUNTER
Spoke with pt who restated that she will be having surgery with Dr. Ingram on 12/23/19.  Pt stated that the pill helped shrink her tumor because it in not longer sticking to the ribs, heart, and lungs.  Pt also stated that she will be calling pre-admit this afternoon to set up that appointment and will call back to set up appointment with [facility] also.  Pt was a little less tangential than usual, but still discussed her current residence and [place].

## 2019-12-16 NOTE — TELEPHONE ENCOUNTER
"Patient called to give an update.  Patient noted that Dr. Ingram said after examining her left breast, \"it's released.\"  Dougie has outpatient surgery on December 23 with Dr. Ingram. Patient needs a refill of her anastrozole by 12/23. Transferred to RN.     "

## 2019-12-17 ENCOUNTER — TELEPHONE (OUTPATIENT)
Dept: MEDICAL GROUP | Facility: PHYSICIAN GROUP | Age: 64
End: 2019-12-17

## 2019-12-19 RX ORDER — IBUPROFEN 200 MG
200 TABLET ORAL PRN
Status: ON HOLD | COMMUNITY
End: 2019-12-23

## 2019-12-23 ENCOUNTER — HOSPITAL ENCOUNTER (OUTPATIENT)
Facility: MEDICAL CENTER | Age: 64
End: 2019-12-23
Attending: SURGERY | Admitting: SURGERY
Payer: MEDICAID

## 2019-12-23 ENCOUNTER — ANESTHESIA EVENT (OUTPATIENT)
Dept: SURGERY | Facility: MEDICAL CENTER | Age: 64
End: 2019-12-23
Payer: MEDICAID

## 2019-12-23 ENCOUNTER — ANESTHESIA (OUTPATIENT)
Dept: SURGERY | Facility: MEDICAL CENTER | Age: 64
End: 2019-12-23
Payer: MEDICAID

## 2019-12-23 VITALS
RESPIRATION RATE: 20 BRPM | DIASTOLIC BLOOD PRESSURE: 88 MMHG | WEIGHT: 102.73 LBS | HEART RATE: 90 BPM | BODY MASS INDEX: 17.12 KG/M2 | TEMPERATURE: 98 F | OXYGEN SATURATION: 95 % | HEIGHT: 65 IN | SYSTOLIC BLOOD PRESSURE: 169 MMHG

## 2019-12-23 LAB
ALBUMIN SERPL BCP-MCNC: 4.5 G/DL (ref 3.2–4.9)
ALBUMIN/GLOB SERPL: 1.5 G/DL
ALP SERPL-CCNC: 109 U/L (ref 30–99)
ALT SERPL-CCNC: 22 U/L (ref 2–50)
ANION GAP SERPL CALC-SCNC: 19 MMOL/L (ref 0–11.9)
AST SERPL-CCNC: 53 U/L (ref 12–45)
BILIRUB SERPL-MCNC: 0.8 MG/DL (ref 0.1–1.5)
BUN SERPL-MCNC: 15 MG/DL (ref 8–22)
CALCIUM SERPL-MCNC: 9.7 MG/DL (ref 8.5–10.5)
CHLORIDE SERPL-SCNC: 103 MMOL/L (ref 96–112)
CO2 SERPL-SCNC: 20 MMOL/L (ref 20–33)
CREAT SERPL-MCNC: 0.53 MG/DL (ref 0.5–1.4)
ERYTHROCYTE [DISTWIDTH] IN BLOOD BY AUTOMATED COUNT: 61.3 FL (ref 35.9–50)
GLOBULIN SER CALC-MCNC: 3.1 G/DL (ref 1.9–3.5)
GLUCOSE SERPL-MCNC: 124 MG/DL (ref 65–99)
HCT VFR BLD AUTO: 42.9 % (ref 37–47)
HGB BLD-MCNC: 15 G/DL (ref 12–16)
MCH RBC QN AUTO: 36.1 PG (ref 27–33)
MCHC RBC AUTO-ENTMCNC: 35 G/DL (ref 33.6–35)
MCV RBC AUTO: 103.4 FL (ref 81.4–97.8)
PATHOLOGY CONSULT NOTE: NORMAL
PLATELET # BLD AUTO: 227 K/UL (ref 164–446)
PMV BLD AUTO: 9.6 FL (ref 9–12.9)
POTASSIUM SERPL-SCNC: 3.8 MMOL/L (ref 3.6–5.5)
PROT SERPL-MCNC: 7.6 G/DL (ref 6–8.2)
RBC # BLD AUTO: 4.15 M/UL (ref 4.2–5.4)
SODIUM SERPL-SCNC: 142 MMOL/L (ref 135–145)
WBC # BLD AUTO: 8.8 K/UL (ref 4.8–10.8)

## 2019-12-23 PROCEDURE — 160009 HCHG ANES TIME/MIN: Performed by: SURGERY

## 2019-12-23 PROCEDURE — 85027 COMPLETE CBC AUTOMATED: CPT

## 2019-12-23 PROCEDURE — 160035 HCHG PACU - 1ST 60 MINS PHASE I: Performed by: SURGERY

## 2019-12-23 PROCEDURE — 160046 HCHG PACU - 1ST 60 MINS PHASE II: Performed by: SURGERY

## 2019-12-23 PROCEDURE — 160029 HCHG SURGERY MINUTES - 1ST 30 MINS LEVEL 4: Performed by: SURGERY

## 2019-12-23 PROCEDURE — 700111 HCHG RX REV CODE 636 W/ 250 OVERRIDE (IP): Performed by: SURGERY

## 2019-12-23 PROCEDURE — 500371 HCHG DRAIN, BLAKE 10MM: Performed by: SURGERY

## 2019-12-23 PROCEDURE — 160002 HCHG RECOVERY MINUTES (STAT): Performed by: SURGERY

## 2019-12-23 PROCEDURE — 500054 HCHG BANDAGE, ELASTIC 6: Performed by: SURGERY

## 2019-12-23 PROCEDURE — 700111 HCHG RX REV CODE 636 W/ 250 OVERRIDE (IP): Performed by: ANESTHESIOLOGY

## 2019-12-23 PROCEDURE — 500389 HCHG DRAIN, RESERVOIR SUCT JP 100CC: Performed by: SURGERY

## 2019-12-23 PROCEDURE — 700102 HCHG RX REV CODE 250 W/ 637 OVERRIDE(OP): Performed by: ANESTHESIOLOGY

## 2019-12-23 PROCEDURE — 700105 HCHG RX REV CODE 258: Performed by: SURGERY

## 2019-12-23 PROCEDURE — 160025 RECOVERY II MINUTES (STATS): Performed by: SURGERY

## 2019-12-23 PROCEDURE — A9270 NON-COVERED ITEM OR SERVICE: HCPCS | Performed by: ANESTHESIOLOGY

## 2019-12-23 PROCEDURE — 80053 COMPREHEN METABOLIC PANEL: CPT

## 2019-12-23 PROCEDURE — 501838 HCHG SUTURE GENERAL: Performed by: SURGERY

## 2019-12-23 PROCEDURE — 88309 TISSUE EXAM BY PATHOLOGIST: CPT

## 2019-12-23 PROCEDURE — 700101 HCHG RX REV CODE 250: Performed by: ANESTHESIOLOGY

## 2019-12-23 PROCEDURE — 700111 HCHG RX REV CODE 636 W/ 250 OVERRIDE (IP)

## 2019-12-23 PROCEDURE — A6402 STERILE GAUZE <= 16 SQ IN: HCPCS | Performed by: SURGERY

## 2019-12-23 PROCEDURE — 160048 HCHG OR STATISTICAL LEVEL 1-5: Performed by: SURGERY

## 2019-12-23 PROCEDURE — 160041 HCHG SURGERY MINUTES - EA ADDL 1 MIN LEVEL 4: Performed by: SURGERY

## 2019-12-23 PROCEDURE — 160036 HCHG PACU - EA ADDL 30 MINS PHASE I: Performed by: SURGERY

## 2019-12-23 RX ORDER — LORAZEPAM 2 MG/ML
0.5 INJECTION INTRAMUSCULAR
Status: DISCONTINUED | OUTPATIENT
Start: 2019-12-23 | End: 2019-12-23 | Stop reason: HOSPADM

## 2019-12-23 RX ORDER — SODIUM CHLORIDE, SODIUM LACTATE, POTASSIUM CHLORIDE, CALCIUM CHLORIDE 600; 310; 30; 20 MG/100ML; MG/100ML; MG/100ML; MG/100ML
INJECTION, SOLUTION INTRAVENOUS CONTINUOUS
Status: DISCONTINUED | OUTPATIENT
Start: 2019-12-23 | End: 2019-12-23 | Stop reason: HOSPADM

## 2019-12-23 RX ORDER — ONDANSETRON 2 MG/ML
INJECTION INTRAMUSCULAR; INTRAVENOUS PRN
Status: DISCONTINUED | OUTPATIENT
Start: 2019-12-23 | End: 2019-12-23 | Stop reason: SURG

## 2019-12-23 RX ORDER — LIDOCAINE HYDROCHLORIDE 10 MG/ML
INJECTION, SOLUTION EPIDURAL; INFILTRATION; INTRACAUDAL; PERINEURAL
Status: COMPLETED
Start: 2019-12-23 | End: 2019-12-23

## 2019-12-23 RX ORDER — OXYCODONE HCL 10 MG/1
10 TABLET, FILM COATED, EXTENDED RELEASE ORAL ONCE
Status: COMPLETED | OUTPATIENT
Start: 2019-12-23 | End: 2019-12-23

## 2019-12-23 RX ORDER — GABAPENTIN 300 MG/1
300 CAPSULE ORAL ONCE
Status: COMPLETED | OUTPATIENT
Start: 2019-12-23 | End: 2019-12-23

## 2019-12-23 RX ORDER — CEFAZOLIN SODIUM 1 G/3ML
INJECTION, POWDER, FOR SOLUTION INTRAMUSCULAR; INTRAVENOUS PRN
Status: DISCONTINUED | OUTPATIENT
Start: 2019-12-23 | End: 2019-12-23 | Stop reason: SURG

## 2019-12-23 RX ORDER — CELECOXIB 200 MG/1
200 CAPSULE ORAL ONCE
Status: COMPLETED | OUTPATIENT
Start: 2019-12-23 | End: 2019-12-23

## 2019-12-23 RX ORDER — OXYCODONE HCL 5 MG/5 ML
5 SOLUTION, ORAL ORAL
Status: COMPLETED | OUTPATIENT
Start: 2019-12-23 | End: 2019-12-23

## 2019-12-23 RX ORDER — LIDOCAINE HYDROCHLORIDE 20 MG/ML
INJECTION, SOLUTION EPIDURAL; INFILTRATION; INTRACAUDAL; PERINEURAL PRN
Status: DISCONTINUED | OUTPATIENT
Start: 2019-12-23 | End: 2019-12-23 | Stop reason: SURG

## 2019-12-23 RX ORDER — ALBUTEROL SULFATE 90 UG/1
2 AEROSOL, METERED RESPIRATORY (INHALATION) EVERY 6 HOURS PRN
COMMUNITY
End: 2020-01-23 | Stop reason: SDUPTHER

## 2019-12-23 RX ORDER — OXYCODONE HCL 5 MG/5 ML
10 SOLUTION, ORAL ORAL
Status: COMPLETED | OUTPATIENT
Start: 2019-12-23 | End: 2019-12-23

## 2019-12-23 RX ORDER — MAGNESIUM SULFATE HEPTAHYDRATE 40 MG/ML
INJECTION, SOLUTION INTRAVENOUS PRN
Status: DISCONTINUED | OUTPATIENT
Start: 2019-12-23 | End: 2019-12-23 | Stop reason: SURG

## 2019-12-23 RX ORDER — ASCORBIC ACID 500 MG
1000 TABLET ORAL DAILY
COMMUNITY
End: 2020-02-11 | Stop reason: SDUPTHER

## 2019-12-23 RX ORDER — BUPIVACAINE HYDROCHLORIDE 2.5 MG/ML
INJECTION, SOLUTION EPIDURAL; INFILTRATION; INTRACAUDAL
Status: DISCONTINUED | OUTPATIENT
Start: 2019-12-23 | End: 2019-12-23 | Stop reason: HOSPADM

## 2019-12-23 RX ORDER — ALENDRONATE SODIUM 70 MG/1
70 TABLET ORAL
COMMUNITY
End: 2020-01-06 | Stop reason: SDUPTHER

## 2019-12-23 RX ORDER — SODIUM CHLORIDE 9 MG/ML
INJECTION, SOLUTION INTRAVENOUS CONTINUOUS
Status: DISCONTINUED | OUTPATIENT
Start: 2019-12-23 | End: 2019-12-23 | Stop reason: HOSPADM

## 2019-12-23 RX ORDER — DEXAMETHASONE SODIUM PHOSPHATE 4 MG/ML
INJECTION, SOLUTION INTRA-ARTICULAR; INTRALESIONAL; INTRAMUSCULAR; INTRAVENOUS; SOFT TISSUE PRN
Status: DISCONTINUED | OUTPATIENT
Start: 2019-12-23 | End: 2019-12-23 | Stop reason: SURG

## 2019-12-23 RX ORDER — IBUPROFEN 200 MG
200 TABLET ORAL EVERY 6 HOURS PRN
COMMUNITY
End: 2020-05-21

## 2019-12-23 RX ORDER — MEPERIDINE HYDROCHLORIDE 25 MG/ML
INJECTION INTRAMUSCULAR; INTRAVENOUS; SUBCUTANEOUS PRN
Status: DISCONTINUED | OUTPATIENT
Start: 2019-12-23 | End: 2019-12-23 | Stop reason: SURG

## 2019-12-23 RX ORDER — HALOPERIDOL 5 MG/ML
1 INJECTION INTRAMUSCULAR
Status: DISCONTINUED | OUTPATIENT
Start: 2019-12-23 | End: 2019-12-23 | Stop reason: HOSPADM

## 2019-12-23 RX ORDER — ACETAMINOPHEN 500 MG
1000 TABLET ORAL ONCE
Status: COMPLETED | OUTPATIENT
Start: 2019-12-23 | End: 2019-12-23

## 2019-12-23 RX ADMIN — FENTANYL CITRATE 25 MCG: 0.05 INJECTION, SOLUTION INTRAMUSCULAR; INTRAVENOUS at 10:40

## 2019-12-23 RX ADMIN — LIDOCAINE HYDROCHLORIDE 5 ML: 20 INJECTION, SOLUTION EPIDURAL; INFILTRATION; INTRACAUDAL at 09:19

## 2019-12-23 RX ADMIN — FENTANYL CITRATE 100 MCG: 50 INJECTION, SOLUTION INTRAMUSCULAR; INTRAVENOUS at 09:36

## 2019-12-23 RX ADMIN — DEXAMETHASONE SODIUM PHOSPHATE 8 MG: 4 INJECTION, SOLUTION INTRA-ARTICULAR; INTRALESIONAL; INTRAMUSCULAR; INTRAVENOUS; SOFT TISSUE at 09:19

## 2019-12-23 RX ADMIN — MEPERIDINE HYDROCHLORIDE 25 MG: 25 INJECTION INTRAMUSCULAR; INTRAVENOUS; SUBCUTANEOUS at 09:33

## 2019-12-23 RX ADMIN — MAGNESIUM SULFATE IN WATER 4 G: 40 INJECTION, SOLUTION INTRAVENOUS at 09:19

## 2019-12-23 RX ADMIN — CEFAZOLIN 2 G: 330 INJECTION, POWDER, FOR SOLUTION INTRAMUSCULAR; INTRAVENOUS at 09:19

## 2019-12-23 RX ADMIN — ONDANSETRON 4 MG: 2 INJECTION INTRAMUSCULAR; INTRAVENOUS at 09:19

## 2019-12-23 RX ADMIN — LIDOCAINE HYDROCHLORIDE 0.5 ML: 10 INJECTION, SOLUTION EPIDURAL; INFILTRATION; INTRACAUDAL at 08:10

## 2019-12-23 RX ADMIN — PROPOFOL 200 MG: 10 INJECTION, EMULSION INTRAVENOUS at 09:19

## 2019-12-23 RX ADMIN — OXYCODONE HYDROCHLORIDE 10 MG: 10 TABLET, FILM COATED, EXTENDED RELEASE ORAL at 08:15

## 2019-12-23 RX ADMIN — SODIUM CHLORIDE, POTASSIUM CHLORIDE, SODIUM LACTATE AND CALCIUM CHLORIDE: 600; 310; 30; 20 INJECTION, SOLUTION INTRAVENOUS at 08:15

## 2019-12-23 RX ADMIN — GABAPENTIN 300 MG: 300 CAPSULE ORAL at 08:15

## 2019-12-23 RX ADMIN — OXYCODONE HYDROCHLORIDE 5 MG: 5 SOLUTION ORAL at 10:40

## 2019-12-23 RX ADMIN — CELECOXIB 200 MG: 200 CAPSULE ORAL at 08:15

## 2019-12-23 RX ADMIN — ACETAMINOPHEN 1000 MG: 500 TABLET ORAL at 08:15

## 2019-12-23 RX ADMIN — Medication 0.5 ML: at 08:10

## 2019-12-23 RX ADMIN — FENTANYL CITRATE 25 MCG: 0.05 INJECTION, SOLUTION INTRAMUSCULAR; INTRAVENOUS at 10:46

## 2019-12-23 RX ADMIN — FENTANYL CITRATE 100 MCG: 50 INJECTION, SOLUTION INTRAMUSCULAR; INTRAVENOUS at 09:29

## 2019-12-23 ASSESSMENT — PAIN SCALES - GENERAL: PAIN_LEVEL: 2

## 2019-12-23 NOTE — PROGRESS NOTES
Assumed patient care at 1342. Patient alert and oriented. Patient's  at bedside.  See flow sheet for VS. Pain is rated 1/10. Dressings are clean dry, and intact. Call light and personal belongings within reach.  Gurney in lowest position. Monitor alarms set appropriately.

## 2019-12-23 NOTE — ANESTHESIA PREPROCEDURE EVALUATION
Last drink 2 days ago    Relevant Problems   NEURO   (+) History of ETOH abuse   (+) History of methamphetamine abuse (HCC)      Other   (+) Breast cancer, stage 3, left (HCC)   (+) Current every day smoker       Physical Exam    Airway   Mallampati: II  TM distance: >3 FB  Neck ROM: full       Cardiovascular - normal exam  Rhythm: regular  Rate: normal  (-) murmur     Dental - normal exam      Very poor dentition   Pulmonary - normal exam  Breath sounds clear to auscultation     Abdominal    Neurological - normal exam               Anesthesia Plan    ASA 3   ASA physical status 3 criteria: alcohol and/or substance dependence or abuse    Plan - general       Airway plan will be LMA        Induction: intravenous    Postoperative Plan: Postoperative administration of opioids is intended.        Informed Consent:    Anesthetic plan and risks discussed with patient.

## 2019-12-23 NOTE — OR NURSING
Notified , Mandi, of pt's living situation.  Mandi stated she will see pt when she arrives into PACU 2.  Call SW at ext [phone] upon arrival into Stage 2.

## 2019-12-23 NOTE — ANESTHESIA TIME REPORT
Anesthesia Start and Stop Event Times     Date Time Event    12/23/2019 0907 Ready for Procedure     0915 Anesthesia Start     1023 Anesthesia Stop        Responsible Staff  12/23/19    Name Role Begin End    Galindo Schwartz M.D. Anesth 0915 1023        Preop Diagnosis (Free Text):  Pre-op Diagnosis     BREAST CANCER FEMALE        Preop Diagnosis (Codes):    Post op Diagnosis  Breast cancer (HCC)      Premium Reason  Non-Premium    Comments:

## 2019-12-23 NOTE — PROGRESS NOTES
"Patient in pre-op, assessment completed, patient appears unkept, fingers and feet covered in dirt. Pt states she, \" lives with a horder with no running water\" , \"there is bottled water\" , \" we removed 81 mice from one room\" pt states the mice are still there. Pt states  came to the house and stated, \" they wanted to get me out of there, we are working on it.\"   Pt also states she drinks one pint of vodka daily.   Dr. Ingram updated and aware of pt's situation.   [facility]  called, message left.     Pt updated on plan of care, all questions answered, no further needs at this time, call light within reach.  "

## 2019-12-23 NOTE — DISCHARGE PLANNING
Medical Social Work    MSW received report from ANAY Raymond. Bedside RN in the OR has concerns about pt. Pt present disheveled, dirt under nails, no running water and living with a hoarder. Pt also drinks a pint of alcohol a day. MSW did chart review. Pt was being assisted by ANAY Arcos in OP oncology. Pt has an APS./EPS worker Sylvie East. MSW tried to call Sylvie. She is out until of the office till January. MSW called and left VM for EPS SW of the day.     MSW met with pt. Pt is alert and oriented. Pt confirmed she has an APS worker Sylvie East. Pt stated she visited last week. Pt lives with a roommate that is a hoarder and has mice everywhere. Pt is trying to find there housing but her income is limited and she cannot work right now. MSW offered pt resources for shelters and senior centers in the [place] areas [facility] [place]. Pt aware of limited resources out in those area. Pt declined wanting to stay in [place] for assistance. Pt is ok going back to her current situation for now. MSW also provided pt with local substance abuse resources. Pt grateful for information. Pt declined any other needs.     MSW updated bedside RN. Pt can d/c home.     MSW received call back from EPS worker Tena Lares. MSW made new report with Tena regarding concerns. Tena stated pt still has an open case and is still being followed by an assigned . No other information could be given at this time.

## 2019-12-23 NOTE — OR NURSING
Patient awake, smiling, states pain is at low level and no nausea.  Feels ready to go home. Report to PACU 2.  No change in assessment.

## 2019-12-23 NOTE — OR NURSING
Discharge instructions, follow up, AMY care, and prescriptions reviewed with patient, patient verbalized and demonstrates understanding.

## 2019-12-23 NOTE — DISCHARGE INSTRUCTIONS
ACTIVITY: Rest and take it easy for the first 24 hours.  A responsible adult is recommended to remain with you during that time.  It is normal to feel sleepy.  We encourage you to not do anything that requires balance, judgment or coordination.    MILD FLU-LIKE SYMPTOMS ARE NORMAL. YOU MAY EXPERIENCE GENERALIZED MUSCLE ACHES, THROAT IRRITATION, HEADACHE AND/OR SOME NAUSEA.    FOR 24 HOURS DO NOT:  Drive, operate machinery or run household appliances.  Drink beer or alcoholic beverages.   Make important decisions or sign legal documents.    SPECIAL INSTRUCTIONS:  Mastectomy: Instructions After Surgery     Pain Management   People experience different types and amount of pain or discomfort after surgery. The goal of pain management is to assess your own level of discomfort and to take medication as needed. You will have better results controlling your pain if you take pain medication before your pain is severe.   You will be given a prescription for a narcotic for the management of moderate pain. It is recommended to take medication for pain when pain is experienced on a regular schedule. Ibuprofen (Advil or Motrin) or Tylenol can be added to or replace the narcotic medication.     Everyone is different and if one plan to decrease your pain is not working, it will be changed. Healing and recovery improve with good pain control.   Please notify us of any drug allergies, reactions or medical problems that would prevent you from taking these drugs. Narcotics should not be taken with alcoholic drinks. Do not use narcotics while driving.   Narcotics also can cause or worsen constipation, so increase your fluid intake, eat high fiber foods - such as prunes and bran - and make sure that you get up and out of bed to take small walks.   An icepack may be helpful to decrease discomfort and swelling, particularly to the armpit after a lymph node dissection. A small pillow positioned in the armpit also may decrease discomfort.  "  Although you will not have felt it at the time, nor remember it afterwards, you will have had a tube down your throat during the surgery. This can often cause a sore throat for a few days following your surgery.     Incision and Dressing Care   Your incision, or scar, has both stitches and steri-strips, which are small white strips of tape, and is covered by a gauze dressing and tape or a plastic dressing.   Do not remove the dressing, steri-strips or stitches. We will remove the dressing in seven to 10 days. We also will remove the sutures in one to two weeks unless they absorb on their own. If the dressing or steri-strips fall off, do not attempt to replace them.   You may shower one day after the drain(s) is out and if you have a plastic dressing.   If you have gauze and paper tape, you may remove it two days after surgery and shower after that. Use a towel to dry your incision thoroughly after showering. Be careful not to touch or remove the steri-strips or sutures.   Bruising and some swelling are common in women after surgery.   A low-grade fever that is under 100 degrees Fahrenheit is normal the day after surgery.   You will have a Aime-Prince (AMY) drain after your surgery. This drain is a plastic tube from under the skin to outside your body with a bulb attached to it. Empty the drain two to three times per day or when the bulb is full. Write down the amount drained on a sheet of paper. Your nurse will teach you how to empty your drain. An information sheet on AMY drains is included in your binder.   A home care nurse may be assigned to check your progress at home.     Activity   Avoid strenuous activity, heavy lifting and vigorous exercise until the stitches are removed. Tell your caregiver what you do and he or she will help you make a personal plan for \"what you can do when\" after surgery.   Walking is a normal activity that can be restarted right away.   You cannot do housework or driving until the " drain is out. You may restart driving when you are no longer on narcotics and you feel safe turning the wheel and stopping quickly.   Following a lymph node dissection, don't avoid using your arm, but don't exercise it until your first post-operative visit.   You will be given exercises to regain movement and flexibility. You may be referred to physical therapy for additional rehabilitation if it is needed.   Most people return to work within three to six weeks. Return to work varies with your type of work, your overall health and personal preferences. Discuss returning to work with us.     Diet   You may resume your regular diet as soon as you can take fluids after recovering from anesthesia.   We encourage eight to 10 glasses of water and non-caffeinated beverages per day, plenty of fruits and vegetables as well as lower fat foods. Talk with us about recommendations for healthy eating.     Follow-Up Care   The pathology results from your surgery should be available within one week after your surgery.   We will contact you by telephone with the results or will inform you at your post-operative visit. Please let us know the telephone number where you may be reached with the results.   Your dressing will be changed or removed at your post-operative visit.       When to Contact Us   Contact us with any questions. Call [phone] and ask to speak with a nurse during the day, or the answering service in the evening to reach your doctor or the doctor on call.   Pain that is not relieved by medication   Fever more than 102 degrees Fahrenheit or chills   Excessive bleeding, such as a bloody dressing   Excessive swelling   Redness outside the dressing   Discharge or bad odor from the wound   Allergic or other reactions to medication(s)   Constipation   Anxiety, depression, trouble sleeping, need more support    SURGICAL DRESSING/BATHING: *remove dressing on Friday, 12/2719*    FOLLOW-UP APPOINTMENT:  A follow-up  appointment should be arranged with your doctor in *12/27/19 (Friday)*; call to schedule.    You should CALL YOUR PHYSICIAN if you develop:  Fever greater than 101 degrees F.  Pain not relieved by medication, or persistent nausea or vomiting.  Excessive bleeding (blood soaking through dressing) or unexpected drainage from the wound.  Extreme redness or swelling around the incision site, drainage of pus or foul smelling drainage.  Inability to urinate or empty your bladder within 8 hours.  Problems with breathing or chest pain.    You should call 911 if you develop problems with breathing or chest pain.  If you are unable to contact your doctor or surgical center, you should go to the nearest emergency room or urgent care center.  Physician's telephone #: Dr. Ingram [phone]    If any questions arise, call your doctor.  If your doctor is not available, please feel free to call the Surgical Center at [phone].  The Center is open Monday through Friday from 7AM to 7PM.  You can also call the [employer] HOTLINE open 24 hours/day, 7 days/week and speak to a nurse at [phone], or toll free at [phone].    A registered nurse may call you a few days after your surgery to see how you are doing after your procedure.    MEDICATIONS: Resume taking daily medication.  Take prescribed pain medication with food.  If no medication is prescribed, you may take non-aspirin pain medication if needed.  PAIN MEDICATION CAN BE VERY CONSTIPATING.  Take a stool softener or laxative such as senokot, pericolace, or milk of magnesia if needed.    Prescription given for oxicodone.  Last pain medication (5 mg oxycodone) given at 10:40 am.    If your physician has prescribed pain medication that includes Acetaminophen (Tylenol), do not take additional Acetaminophen (Tylenol) while taking the prescribed medication.    Depression / Suicide Risk    As you are discharged from this [facility] facility, it is important to learn how to  keep safe from harming yourself.    Recognize the warning signs:  · Abrupt changes in personality, positive or negative- including increase in energy   · Giving away possessions  · Change in eating patterns- significant weight changes-  positive or negative  · Change in sleeping patterns- unable to sleep or sleeping all the time   · Unwillingness or inability to communicate  · Depression  · Unusual sadness, discouragement and loneliness  · Talk of wanting to die  · Neglect of personal appearance   · Rebelliousness- reckless behavior  · Withdrawal from people/activities they love  · Confusion- inability to concentrate     If you or a loved one observes any of these behaviors or has concerns about self-harm, here's what you can do:  · Talk about it- your feelings and reasons for harming yourself  · Remove any means that you might use to hurt yourself (examples: pills, rope, extension cords, firearm)  · Get professional help from the community (Mental Health, Substance Abuse, psychological counseling)  · Do not be alone:Call your Safe Contact- someone whom you trust who will be there for you.  · Call your local CRISIS HOTLINE 913-4881 or 030-273-0543  · Call your local Children's Mobile Crisis Response Team [place] (784) 979-8598 or www.H[employer]AHelper  · Call the toll free National Suicide Prevention Hotlines   · National Suicide Prevention Lifeline 053-821-MAHO (9864)  · National Hope Line Network 800-SUICIDE (301-4291)

## 2019-12-23 NOTE — OR NURSING
Notified Dr. Ingram of pt's living condition - no running water and lives with a horder.  Dr. Ingram is aware.  Call placed to .  Waiting on return call.

## 2019-12-23 NOTE — ANESTHESIA PROCEDURE NOTES
Airway  Date/Time: 12/23/2019 9:20 AM  Performed by: Galindo Schwartz M.D.  Authorized by: Galindo Schwartz M.D.     Location:  OR  Urgency:  Elective  Indications for Airway Management:  Anesthesia  Spontaneous Ventilation: absent    Sedation Level:  Deep  Preoxygenated: Yes    Mask Difficulty Assessment:  0 - not attempted  Final Airway Type:  Supraglottic airway  Final Supraglottic Airway:  Standard LMA  SGA Size:  3  Number of Attempts at Approach:  1

## 2019-12-23 NOTE — OR NURSING
Respirations easy. HOB elevated.  Prevena dressing clean and dry to left chest. AMY to compressed bulb suction, draining blood. Pain meds with good effect.  Denies nausea.

## 2019-12-23 NOTE — ANESTHESIA POSTPROCEDURE EVALUATION
Patient: Dougie Baptiste    Procedure Summary     Date:  12/23/19 Room / Location:  [facility] 09 / SURGERY [place] [facility]    Anesthesia Start:  0915 Anesthesia Stop:  1023    Procedures:       MASTECTOMY - SIMPLE (Left )      LYMPHADENECTOMY, AXILLARY (Left Axilla) Diagnosis:  (left breast cancer)    Surgeon:  Gracia Ingram M.D. Responsible Provider:  Galindo Schwartz M.D.    Anesthesia Type:  general ASA Status:  3          Final Anesthesia Type: general  Last vitals  BP   Blood Pressure: 153/93    Temp   36.7 °C (98.1 °F)    Pulse   Pulse: (!) 107   Resp   18    SpO2   94 %      Anesthesia Post Evaluation    Patient location during evaluation: PACU  Patient participation: complete - patient participated  Level of consciousness: awake and alert  Pain score: 2    Airway patency: patent  Anesthetic complications: no  Cardiovascular status: hemodynamically stable  Respiratory status: acceptable  Hydration status: euvolemic    PONV: none           Nurse Pain Score: 2 (NPRS)

## 2019-12-23 NOTE — OP REPORT
DATE OF SERVICE:  12/23/2019    PREOPERATIVE DIAGNOSIS:  Left-sided breast cancer with ulceration and obvious   lymph node involvement.    POSTOPERATIVE DIAGNOSIS:  Left-sided breast cancer with ulceration and obvious   lymph node involvement.    PROCEDURE:  Left modified radical mastectomy.    SURGEON:  Gracia Ingram MD    ASSISTANT:  MICHELLE Treviño    ANESTHESIA:  Laryngeal mask.    ANESTHESIOLOGIST:  Galindo Schwartz MD    INDICATIONS:  The patient is a 64-year-old female who has had an ulcerative   mass on her left breast for several months.  She was actually diagnosed with   breast cancer several months ago and was placed on Arimidex in a hope to   shrink the mass and allow it to be more mobile to allow for excision.  This   has been achieved and she is being brought to the operating room at this time   now for a simple mastectomy.  In addition, she has obvious lymph node   involvement and will have a lymph node dissection at the same time.      FINDINGS:  Approximately 6 cm ulcerative mass involving the periareolar   tissues.  A simple mastectomy was performed requiring extensive mobilization   of the skin superiorly and inferiorly to close the defect.  There were   multiple positive lymph nodes that were removed using LigaSure device.      DESCRIPTION OF PROCEDURE:  After the patient was identified and consented, she   was brought to the operating room and placed in supine position.  Patient   underwent laryngeal mask anesthetic clearance.  Patient's right chest and   axilla were prepped and draped in usual sterile fashion.  Elliptical incision   was made around the ulcerative mass in the periareolar area.  Superior and   inferior skin flaps were then developed and the breast was amputated off the   pectoralis major muscle using electrocautery.  Hemostasis obtained with   electrocautery.  The axillary fascia was opened and the lymph nodes were   removed.  There was one that was stuck to the  skin that was  from the   skin and the skin was preserved in order to allow for extra skin to close the   mastectomy defect.  Once the lymph nodes were  from the skin, they   were removed using LigaSure device.  Once that was completed, the wound was   irrigated and hemostasis secured.  A 10 mm drain was placed through a separate   stab incision.  ____ was closed with 3-0 subcutaneous layer and skin was   closed with staples.  A Prevena dressing was placed over the wound.  Patient   was extubated and taken to recovery in stable condition.  All sponge and   needle counts were correct.       ____________________________________     TOMMIE REDDING MD    [facility] / [facility]    DD:  12/23/2019 10:13:36  DT:  12/23/2019 10:51:48    D#:  [id number]  Job#:  [id number]    cc: Neelam Jalloh PA-C, ONELIA HERNANDEZ MD, Bk Croft MD

## 2019-12-23 NOTE — OR NURSING
SW has left patient bedside. Per SW, patient has declined hospital stay. Patient provided with wheelchair to  area with CNA at side.

## 2019-12-31 NOTE — DISCHARGE PLANNING
Dmitriy Wills from [facility] called to follow up on report made by ALISON Ornelas. Dmitriy states that Pt case has been reassigned to [facility] [phone].     APS  is Sylvie [facility] [phone]

## 2020-01-03 ENCOUNTER — TELEPHONE (OUTPATIENT)
Dept: HEMATOLOGY ONCOLOGY | Facility: MEDICAL CENTER | Age: 65
End: 2020-01-03

## 2020-01-03 NOTE — TELEPHONE ENCOUNTER
"Dougie called to tell us that she had her mastectomy 3 weeks ago and that she is doing well. She has an appt on January 8th to see Dr. Ingram to have her esha removed. Dougie said that she is now staying at [facility]. Patient noted that she was prescribed oxycodone after surgery and was \"loaded\" having not woken up for three days post meds, so she stopped taking them. Dougie stated that she has no interest in taking those again and is content to be on ibuprofen for pain.    Patient mentioned having trouble refilling her anastrozole after trying to transfer her prescription from [facility] in [place] to the [facility] in [facility] ([phone]). Dougie said that she hasn't taken this med in over a week and needs help getting that refilled. She mentioned possibly needing an appt in our office and if so, that next Wednesday would be a great time to have it, likely because that is the day she comes in to see Dr. Ingram to have her staples removed. Patient would appreciate a call back.  "

## 2020-01-06 DIAGNOSIS — Z17.0 MALIGNANT NEOPLASM OF NIPPLE OF LEFT BREAST IN FEMALE, ESTROGEN RECEPTOR POSITIVE (HCC): ICD-10-CM

## 2020-01-06 DIAGNOSIS — C50.012 MALIGNANT NEOPLASM OF NIPPLE OF LEFT BREAST IN FEMALE, ESTROGEN RECEPTOR POSITIVE (HCC): ICD-10-CM

## 2020-01-06 NOTE — TELEPHONE ENCOUNTER
Was the patient seen in the last year in this department? Yes    Does patient have an active prescription for medications requested? No     Received Request Via: Patient     Patient no showed appointment in November of 2019.     Scheduled patient on 01/14/2020 at 12:00 pm to see Dr. Mg.     Patient also stated she had her mastectomy with Dr. Ingram as well.

## 2020-01-07 RX ORDER — ALENDRONATE SODIUM 70 MG/1
70 TABLET ORAL
Qty: 2 TAB | Refills: 0 | Status: SHIPPED | OUTPATIENT
Start: 2020-01-07 | End: 2020-01-14 | Stop reason: SDUPTHER

## 2020-01-07 RX ORDER — ANASTROZOLE 1 MG/1
1 TABLET ORAL DAILY
Qty: 8 TAB | Refills: 0 | Status: SHIPPED | OUTPATIENT
Start: 2020-01-07 | End: 2020-01-14 | Stop reason: SDUPTHER

## 2020-01-07 NOTE — TELEPHONE ENCOUNTER
Phone Number Called: [phone]    Call outcome: spoke to patient regarding message below    Message: we only gave her a 2 week supply of these medications and we will refill them once she has followed up with Dr. Mg on 01/14/2020 at 12:00 pm. I reminded patient of her appointment with Dr. Mg multiple times while on the phone with her.

## 2020-01-07 NOTE — TELEPHONE ENCOUNTER
Patient with history of no-show appointments as well as unable to get in contact with patient multiple times.  She is due for a follow-up visit on January 14, 2020.  She will be given a supply of medication refill at this time to get her through to that appointment and further refills will be given to her when she is seen in the clinic at that time.

## 2020-01-14 ENCOUNTER — OFFICE VISIT (OUTPATIENT)
Dept: HEMATOLOGY ONCOLOGY | Facility: MEDICAL CENTER | Age: 65
End: 2020-01-14
Payer: MEDICAID

## 2020-01-14 VITALS
SYSTOLIC BLOOD PRESSURE: 124 MMHG | DIASTOLIC BLOOD PRESSURE: 68 MMHG | OXYGEN SATURATION: 95 % | TEMPERATURE: 98 F | HEIGHT: 63 IN | RESPIRATION RATE: 16 BRPM | BODY MASS INDEX: 18.61 KG/M2 | HEART RATE: 92 BPM | WEIGHT: 105.05 LBS

## 2020-01-14 DIAGNOSIS — Z17.0 MALIGNANT NEOPLASM OF NIPPLE OF LEFT BREAST IN FEMALE, ESTROGEN RECEPTOR POSITIVE (HCC): ICD-10-CM

## 2020-01-14 DIAGNOSIS — C50.012 MALIGNANT NEOPLASM OF NIPPLE OF LEFT BREAST IN FEMALE, ESTROGEN RECEPTOR POSITIVE (HCC): ICD-10-CM

## 2020-01-14 PROCEDURE — 99214 OFFICE O/P EST MOD 30 MIN: CPT | Performed by: INTERNAL MEDICINE

## 2020-01-14 RX ORDER — IBUPROFEN 600 MG/1
TABLET ORAL
COMMUNITY
Start: 2020-01-02 | End: 2020-05-21

## 2020-01-14 RX ORDER — MEPERIDINE HYDROCHLORIDE 25 MG/ML
INJECTION INTRAMUSCULAR; INTRAVENOUS; SUBCUTANEOUS
COMMUNITY
Start: 2019-12-23 | End: 2020-05-21

## 2020-01-14 RX ORDER — ONDANSETRON 2 MG/ML
INJECTION INTRAMUSCULAR; INTRAVENOUS
COMMUNITY
Start: 2019-12-23 | End: 2020-05-21

## 2020-01-14 RX ORDER — OXYCODONE HYDROCHLORIDE 5 MG/1
TABLET ORAL
COMMUNITY
Start: 2019-12-23 | End: 2020-05-21

## 2020-01-14 RX ORDER — ANASTROZOLE 1 MG/1
1 TABLET ORAL DAILY
Qty: 90 TAB | Refills: 1 | Status: SHIPPED | OUTPATIENT
Start: 2020-01-14 | End: 2020-04-09 | Stop reason: SDUPTHER

## 2020-01-14 RX ORDER — ALENDRONATE SODIUM 70 MG/1
70 TABLET ORAL
Qty: 2 TAB | Refills: 0 | Status: SHIPPED | OUTPATIENT
Start: 2020-01-14 | End: 2020-01-14 | Stop reason: SDUPTHER

## 2020-01-14 RX ORDER — DEXAMETHASONE SODIUM PHOSPHATE 4 MG/ML
INJECTION, SOLUTION INTRA-ARTICULAR; INTRALESIONAL; INTRAMUSCULAR; INTRAVENOUS; SOFT TISSUE
COMMUNITY
Start: 2019-12-23 | End: 2020-05-21

## 2020-01-14 RX ORDER — SODIUM CHLORIDE, SODIUM LACTATE, POTASSIUM CHLORIDE, CALCIUM CHLORIDE 600; 310; 30; 20 MG/100ML; MG/100ML; MG/100ML; MG/100ML
INJECTION, SOLUTION INTRAVENOUS
COMMUNITY
Start: 2019-12-23 | End: 2020-05-21

## 2020-01-14 RX ORDER — MAGNESIUM SULFATE HEPTAHYDRATE 40 MG/ML
INJECTION, SOLUTION INTRAVENOUS
COMMUNITY
Start: 2019-12-23 | End: 2020-05-21

## 2020-01-14 RX ORDER — BUPIVACAINE HYDROCHLORIDE 2.5 MG/ML
INJECTION, SOLUTION EPIDURAL; INFILTRATION; INTRACAUDAL
COMMUNITY
Start: 2019-12-23 | End: 2020-05-21

## 2020-01-14 RX ORDER — ALENDRONATE SODIUM 70 MG/1
70 TABLET ORAL
Qty: 4 TAB | Refills: 5 | Status: SHIPPED | OUTPATIENT
Start: 2020-01-14 | End: 2020-03-18 | Stop reason: SDUPTHER

## 2020-01-14 RX ORDER — CEFAZOLIN SODIUM 1 G/3ML
INJECTION, POWDER, FOR SOLUTION INTRAMUSCULAR; INTRAVENOUS
COMMUNITY
Start: 2019-12-23 | End: 2020-02-11

## 2020-01-14 ASSESSMENT — PAIN SCALES - GENERAL: PAINLEVEL: 9=SEVERE PAIN
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
## 2020-01-23 RX ORDER — ALBUTEROL SULFATE 90 UG/1
2 AEROSOL, METERED RESPIRATORY (INHALATION) EVERY 6 HOURS PRN
Qty: 8.5 G | Refills: 1 | Status: SHIPPED | OUTPATIENT
Start: 2020-01-23 | End: 2020-02-11 | Stop reason: SDUPTHER

## 2020-02-11 ENCOUNTER — OFFICE VISIT (OUTPATIENT)
Dept: MEDICAL GROUP | Facility: MEDICAL CENTER | Age: 65
End: 2020-02-11
Attending: FAMILY MEDICINE
Payer: MEDICAID

## 2020-02-11 VITALS
HEART RATE: 88 BPM | WEIGHT: 103 LBS | RESPIRATION RATE: 16 BRPM | OXYGEN SATURATION: 98 % | TEMPERATURE: 99.2 F | HEIGHT: 63 IN | SYSTOLIC BLOOD PRESSURE: 150 MMHG | BODY MASS INDEX: 18.25 KG/M2 | DIASTOLIC BLOOD PRESSURE: 80 MMHG

## 2020-02-11 DIAGNOSIS — R06.2 WHEEZING: ICD-10-CM

## 2020-02-11 DIAGNOSIS — C50.912 BREAST CANCER, STAGE 3, LEFT (HCC): ICD-10-CM

## 2020-02-11 DIAGNOSIS — M16.11 PRIMARY OSTEOARTHRITIS OF RIGHT HIP: ICD-10-CM

## 2020-02-11 DIAGNOSIS — F10.11 ALCOHOL ABUSE, IN REMISSION: ICD-10-CM

## 2020-02-11 PROCEDURE — 99213 OFFICE O/P EST LOW 20 MIN: CPT | Performed by: FAMILY MEDICINE

## 2020-02-11 PROCEDURE — 99204 OFFICE O/P NEW MOD 45 MIN: CPT | Performed by: FAMILY MEDICINE

## 2020-02-11 RX ORDER — ASCORBIC ACID 500 MG
1000 TABLET ORAL DAILY
Qty: 180 TAB | Refills: 3 | Status: SHIPPED | OUTPATIENT
Start: 2020-02-11 | End: 2020-06-08 | Stop reason: SDUPTHER

## 2020-02-11 RX ORDER — MELOXICAM 7.5 MG/1
7.5 TABLET ORAL DAILY
Qty: 30 TAB | Refills: 2 | Status: SHIPPED | OUTPATIENT
Start: 2020-02-11 | End: 2020-03-10 | Stop reason: SDUPTHER

## 2020-02-11 RX ORDER — ALBUTEROL SULFATE 90 UG/1
2 AEROSOL, METERED RESPIRATORY (INHALATION) EVERY 6 HOURS PRN
Qty: 8.5 G | Refills: 11 | Status: SHIPPED | OUTPATIENT
Start: 2020-02-11 | End: 2020-05-11 | Stop reason: SDUPTHER

## 2020-02-11 ASSESSMENT — PATIENT HEALTH QUESTIONNAIRE - PHQ9: CLINICAL INTERPRETATION OF PHQ2 SCORE: 0

## 2020-02-11 NOTE — ASSESSMENT & PLAN NOTE
Patient used to drink 1/2 to 1 pint of vodka.  Last dose of alcohol was on 12/23/2019.  Patient denies any history of alcohol-related jaundice or alcohol withdrawal seizures.
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
## 2020-02-11 NOTE — ASSESSMENT & PLAN NOTE
Patient presents to establish primary care.  She was noted to have physical findings of possible breast cancer back in 2010.  She did not immediately pursue evaluation.  She did have a positive left breast biopsy in 2018 with Dr. Gamble, and then had a left mastectomy with 8 out of 8 positive lymph nodes on 12/23/2019.  She continues to be followed by Dr. Ingram and by Dr. Mg, oncology At [facility].  She is on anastrozole for multiple months.  At this time she has declined chemotherapy and radiation therapy.  She is currently living with an acquaintance in [place] and is hoping to move into her own apartment in [place] within the next month.

## 2020-02-11 NOTE — ASSESSMENT & PLAN NOTE
Patient reports history of occasional wheezing.  She reports that responds well to inhaled albuterol.  Patient reports that on and off she smokes about 2 small cigars per day.  She denies daily cough or sputum production
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
## 2020-03-10 RX ORDER — MELOXICAM 7.5 MG/1
7.5 TABLET ORAL DAILY
Qty: 30 TAB | Refills: 2 | Status: SHIPPED | OUTPATIENT
Start: 2020-03-10 | End: 2020-04-09 | Stop reason: SDUPTHER

## 2020-03-18 DIAGNOSIS — Z17.0 MALIGNANT NEOPLASM OF NIPPLE OF LEFT BREAST IN FEMALE, ESTROGEN RECEPTOR POSITIVE (HCC): ICD-10-CM

## 2020-03-18 DIAGNOSIS — C50.012 MALIGNANT NEOPLASM OF NIPPLE OF LEFT BREAST IN FEMALE, ESTROGEN RECEPTOR POSITIVE (HCC): ICD-10-CM

## 2020-03-18 RX ORDER — ALENDRONATE SODIUM 70 MG/1
70 TABLET ORAL
Qty: 12 TAB | Refills: 0 | Status: SHIPPED | OUTPATIENT
Start: 2020-03-18 | End: 2020-06-08 | Stop reason: SDUPTHER

## 2020-03-24 ENCOUNTER — TELEPHONE (OUTPATIENT)
Dept: HEMATOLOGY ONCOLOGY | Facility: MEDICAL CENTER | Age: 65
End: 2020-03-24

## 2020-03-24 NOTE — TELEPHONE ENCOUNTER
1. Caller Name: Dougie Baptiste                          Call Back Number: [phone]    2.  Does patient have any active symptoms of respiratory illness (fever OR cough OR shortness of breath)? No.     Patient called back in regards of COVID-19 prescreen.  Pt states they do not live in a house with someone who has COVID-19 nor has been around any other people for groups or activities.     Patient is aware of no visitors.

## 2020-03-25 ENCOUNTER — OFFICE VISIT (OUTPATIENT)
Dept: HEMATOLOGY ONCOLOGY | Facility: MEDICAL CENTER | Age: 65
End: 2020-03-25
Payer: MEDICAID

## 2020-03-25 VITALS
BODY MASS INDEX: 19.7 KG/M2 | DIASTOLIC BLOOD PRESSURE: 84 MMHG | TEMPERATURE: 97.8 F | HEART RATE: 100 BPM | WEIGHT: 107.03 LBS | HEIGHT: 62 IN | SYSTOLIC BLOOD PRESSURE: 148 MMHG | OXYGEN SATURATION: 96 % | RESPIRATION RATE: 16 BRPM

## 2020-03-25 DIAGNOSIS — C50.912 BREAST CANCER, STAGE 3, LEFT (HCC): ICD-10-CM

## 2020-03-25 PROCEDURE — 99213 OFFICE O/P EST LOW 20 MIN: CPT | Performed by: INTERNAL MEDICINE

## 2020-03-25 ASSESSMENT — FIBROSIS 4 INDEX: FIB4 SCORE: 3.14

## 2020-03-25 NOTE — PROGRESS NOTES
"Date of visit: 3/25/2020  10:53 AM    Chief Complaint:   Breast cancer    Identification/Prior relevant history:   - left breast lesion biopsy 7/13/18: ER/KS positive, HER2 negative invasive ductal carcinoma.  - breast mass had been present since 2010 with no work up or treatment  - started anastrozole in August of 2018, baseline PET was positive in axillary lymph nodes and breast mass.   - smastectomy and ALNDx 12/13/19 : residual 6.4 cm high grade IDC with positive deep margin and invasion into skeletal muscle, 8/8 positive left axillary lymph nodes w/ carcinoma noted in surrounding tissue.   - declined radiation therapy    Interim history  Patient reports feeling well. She denies new pain. Old stable right hip pain is still present. She is possibly undergoing a hip replacement. She is taking NSADs for pain.  She is currently continuing anastrozole. Denies hot flashes or musculoskeletal pain. She denies lumps, skin change or apin around mastectomy scar.     Past Medical History:      Past Medical History:   Diagnosis Date   • Anxiety    • Arthritis    • Asthma     inhaler use daily,    • Bowel habit changes 12/19/2019    Constipation   • Breast cancer (HCC) 12/19/2019    Left Breast   • Cancer (HCC)     Left Breast   • Cirrhosis (HCC) 12/19/2019   • Dental disorder 12/19/2019    \"Missing Teeth.\"   • Deviated septum    • ETOH abuse    • Hemorrhagic disorder (HCC)     H/O Nosebleeds.   • Osteoporosis    • Teeth decayed        Allergies:         Other misc and Versed    Medications:         Current Outpatient Medications   Medication Sig Dispense Refill   • alendronate (FOSAMAX) 70 MG Tab Take 1 Tab by mouth every 7 days. On Monday 12 Tab 0   • meloxicam (MOBIC) 7.5 MG Tab Take 1 Tab by mouth every day. 30 Tab 2   • albuterol (PROVENTIL HFA) 108 (90 Base) MCG/ACT Aero Soln inhalation aerosol Inhale 2 Puffs by mouth every 6 hours as needed for Shortness of Breath. 8.5 g 11   • ascorbic acid (ASCORBIC ACID) 500 MG Tab " Take 2 Tabs by mouth every day. 180 Tab 3   • Calcium 600-400 MG-UNIT Chew Tab Take 1 tablet by mouth every day. 90 Tab 3   • oxyCODONE immediate-release (ROXICODONE) 5 MG Tab TAKE 1 TABLET PO Q 4 HOURS PRN FOR PAIN FOR 5 DAYS     • ondansetron (ZOFRAN) 4 MG/2ML Solution injection      • meperidine (DEMEROL) 25 MG/ML Solution      • magnesium sulfate 4 GM/100ML Solution IVPB premix      • Lactated Ringers (LR) Solution      • ibuprofen (MOTRIN) 600 MG Tab      • dexamethasone (DECADRON) 4 MG/ML Solution      • bupivacaine 0.25% (SENSORCAINE-MARCAINE) 0.25 % Solution      • anastrozole (ARIMIDEX) 1 MG Tab Take 1 Tab by mouth every day. 90 Tab 1   • CALCIUM PO Take 1 Tab by mouth every day.     • ibuprofen (MOTRIN) 200 MG Tab Take 200 mg by mouth every 6 hours as needed for Mild Pain.       No current facility-administered medications for this visit.        Social History:     Social History     Socioeconomic History   • Marital status: Single     Spouse name: Not on file   • Number of children: 1   • Years of education: Not on file   • Highest education level: Not on file   Occupational History   • Not on file   Social Needs   • Financial resource strain: Not on file   • Food insecurity     Worry: Not on file     Inability: Not on file   • Transportation needs     Medical: Not on file     Non-medical: Not on file   Tobacco Use   • Smoking status: Current Every Day Smoker     Packs/day: 0.00     Years: 10.00     Pack years: 0.00     Types: Cigars   • Smokeless tobacco: Never Used   • Tobacco comment: 3 cigars a day cigarillos   Substance and Sexual Activity   • Alcohol use: Yes     Alcohol/week: 14.4 oz     Types: 24 Shots of liquor per week     Comment: 1 pint of vodka, last drink saturday   • Drug use: No     Types: Methamphetamines     Comment: Many years ago   • Sexual activity: Not Currently   Lifestyle   • Physical activity     Days per week: Not on file     Minutes per session: Not on file   • Stress: Not on  "file   Relationships   • Social connections     Talks on phone: Not on file     Gets together: Not on file     Attends Advent service: Not on file     Active member of club or organization: Not on file     Attends meetings of clubs or organizations: Not on file     Relationship status: Not on file   • Intimate partner violence     Fear of current or ex partner: Not on file     Emotionally abused: Not on file     Physically abused: Not on file     Forced sexual activity: Not on file   Other Topics Concern   • Not on file   Social History Narrative   • Not on file       Family History:      Family History   Problem Relation Age of Onset   • Arthritis Mother    • Heart Failure Mother    • Osteoporosis Mother    • Arthritis Father    • Heart Failure Father    • Heart Attack Father    • Heart Disease Father    • Hyperlipidemia Father    • Hypertension Father    • Cancer Paternal Aunt         Colon cancer       Review of Systems:  Constitutional: Negative for fever, chills, weight loss and malaise/fatigue.    HEENT: No new auditory or visual complaints. No sore throat and neck pain.     Respiratory: Negative for cough, sputum production, shortness of breath and wheezing.    Cardiovascular: Negative for chest pain, palpitations, orthopnea and leg swelling.    Gastrointestinal: Negative for heartburn, nausea, vomiting and abdominal pain.    Genitourinary: Negative for dysuria, hematuria    Musculoskeletal: right hip pain, chronic, unchanged   Skin: Negative for rash and itching.    Neurological: Negative for focal weakness and headaches.    Endo/Heme/Allergies: No abnormal bleed/bruise.     Physical Exam:  Vitals: /84 (BP Location: Right arm, Patient Position: Sitting, BP Cuff Size: Adult)   Pulse 100   Temp 36.6 °C (97.8 °F) (Temporal)   Resp 16   Ht 1.575 m (5' 2\")   Wt 48.5 kg (107 lb 0.5 oz)   SpO2 96%   BMI 19.58 kg/m²   General: No acute distress.  Eyes: Normal conjuctiva and lids. No icterus.  HEENT: " Oropharynx clear.   Neck: Supple with no palpable masses.  Lymph nodes: No palpable cervical, supraclavicular or axillary lymphadenopathy.    CVS: regular rate and rhythm, no rubs or gallops.   RESP: Clear to auscultation bilaterally with normal respiratory effort.   ABD: Soft, non tender, non distended, normal bowel sounds, no palpable organomegaly  EXT: No edema or cyanosis.  CNS: Alert and oriented x3, No focal deficits.  Skin: No rash on visible skin.  Beast exam: healed left mastectomy and ALNDx scar with no lumps or nodules.       Labs:   No visits with results within 1 Week(s) from this visit.   Latest known visit with results is:   Admission on 12/23/2019, Discharged on 12/23/2019   Component Date Value Ref Range Status   • WBC 12/23/2019 8.8  4.8 - 10.8 K/uL Final   • RBC 12/23/2019 4.15* 4.20 - 5.40 M/uL Final   • Hemoglobin 12/23/2019 15.0  12.0 - 16.0 g/dL Final   • Hematocrit 12/23/2019 42.9  37.0 - 47.0 % Final   • MCV 12/23/2019 103.4* 81.4 - 97.8 fL Final   • MCH 12/23/2019 36.1* 27.0 - 33.0 pg Final   • MCHC 12/23/2019 35.0  33.6 - 35.0 g/dL Final   • RDW 12/23/2019 61.3* 35.9 - 50.0 fL Final   • Platelet Count 12/23/2019 227  164 - 446 K/uL Final   • MPV 12/23/2019 9.6  9.0 - 12.9 fL Final   • Sodium 12/23/2019 142  135 - 145 mmol/L Final   • Potassium 12/23/2019 3.8  3.6 - 5.5 mmol/L Final   • Chloride 12/23/2019 103  96 - 112 mmol/L Final   • Co2 12/23/2019 20  20 - 33 mmol/L Final   • Anion Gap 12/23/2019 19.0* 0.0 - 11.9 Final    Results confirmed by repeat testing.   • Glucose 12/23/2019 124* 65 - 99 mg/dL Final   • Bun 12/23/2019 15  8 - 22 mg/dL Final   • Creatinine 12/23/2019 0.53  0.50 - 1.40 mg/dL Final   • Calcium 12/23/2019 9.7  8.5 - 10.5 mg/dL Final   • AST(SGOT) 12/23/2019 53* 12 - 45 U/L Final   • ALT(SGPT) 12/23/2019 22  2 - 50 U/L Final   • Alkaline Phosphatase 12/23/2019 109* 30 - 99 U/L Final   • Total Bilirubin 12/23/2019 0.8  0.1 - 1.5 mg/dL Final   • Albumin 12/23/2019 4.5   3.2 - 4.9 g/dL Final   • Total Protein 12/23/2019 7.6  6.0 - 8.2 g/dL Final   • Globulin 12/23/2019 3.1  1.9 - 3.5 g/dL Final   • A-G Ratio 12/23/2019 1.5  g/dL Final   • GFR If  12/23/2019 >60  >60 mL/min/1.73 m 2 Final   • GFR If Non  12/23/2019 >60  >60 mL/min/1.73 m 2 Final   • Pathology Request 12/23/2019 Sent to [facility]   Final           Assessment and Plan:  Dougie Baptiste  is a 64 year old woman with neglected breast carcinoma, ER/UT positive, HER2 negative, noted close to a decade ago, and placed on anastrozole resulting in a shrinkage of the left breast mass. She eventually underwent a mastectomy and axillary dissection in December of 2019 with pathology showing 8/8 positive lymph nodes, and a 6.4 cm primary breast carcinoma with positive deep margin.  She declined radiation therapy. She would like to stay on anastrozole at current time and not consider further therapy. She is tolerating anastrozole well with no sign or symptom of cancer recurrence. In terms of her right hip pain she is anticipating hip replacement in several months. She will return to clinic in 3 months or sooner if needed.     She agreed and verbalized understanding of the current plan.      SIGNATURES:  Ezequiel Mg M.D.    CC:  Ernesto Burnette M.D.  No ref. provider found

## 2020-03-27 ENCOUNTER — TELEPHONE (OUTPATIENT)
Dept: HEMATOLOGY ONCOLOGY | Facility: MEDICAL CENTER | Age: 65
End: 2020-03-27

## 2020-03-27 NOTE — TELEPHONE ENCOUNTER
Patient called the office this morning to tell us thank you for everything. Patient also stated they wanted to inform us that she fell down some stairs but is okay. Patient says they feel fine and that they are okay. Patient stated have a blessed day and then hung up.

## 2020-04-08 DIAGNOSIS — Z17.0 MALIGNANT NEOPLASM OF NIPPLE OF LEFT BREAST IN FEMALE, ESTROGEN RECEPTOR POSITIVE (HCC): ICD-10-CM

## 2020-04-08 DIAGNOSIS — C50.012 MALIGNANT NEOPLASM OF NIPPLE OF LEFT BREAST IN FEMALE, ESTROGEN RECEPTOR POSITIVE (HCC): ICD-10-CM

## 2020-04-08 DIAGNOSIS — M16.11 PRIMARY OSTEOARTHRITIS OF RIGHT HIP: ICD-10-CM

## 2020-04-09 RX ORDER — ANASTROZOLE 1 MG/1
1 TABLET ORAL DAILY
Qty: 90 TAB | Refills: 1 | Status: SHIPPED | OUTPATIENT
Start: 2020-04-09 | End: 2020-04-10 | Stop reason: SDUPTHER

## 2020-04-09 RX ORDER — MELOXICAM 7.5 MG/1
7.5 TABLET ORAL DAILY
Qty: 30 TAB | Refills: 2 | Status: SHIPPED | OUTPATIENT
Start: 2020-04-09

## 2020-04-09 NOTE — TELEPHONE ENCOUNTER
Received request via: Patient    Was the patient seen in the last year in this department? Yes  Future Appointments       Provider Department [place]    6/25/2020 10:40 AM Ezequiel Mg M.D. [facility]           Does the patient have an active prescription (recently filled or refills available) for medication(s) requested? No

## 2020-04-10 DIAGNOSIS — C50.012 MALIGNANT NEOPLASM OF NIPPLE OF LEFT BREAST IN FEMALE, ESTROGEN RECEPTOR POSITIVE (HCC): ICD-10-CM

## 2020-04-10 DIAGNOSIS — Z17.0 MALIGNANT NEOPLASM OF NIPPLE OF LEFT BREAST IN FEMALE, ESTROGEN RECEPTOR POSITIVE (HCC): ICD-10-CM

## 2020-04-10 RX ORDER — ANASTROZOLE 1 MG/1
1 TABLET ORAL DAILY
Qty: 90 TAB | Refills: 0 | Status: SHIPPED | OUTPATIENT
Start: 2020-04-10 | End: 2020-07-17 | Stop reason: SDUPTHER

## 2020-04-10 RX ORDER — ANASTROZOLE 1 MG/1
1 TABLET ORAL DAILY
Qty: 90 TAB | Refills: 0 | Status: SHIPPED | OUTPATIENT
Start: 2020-04-10 | End: 2020-04-10 | Stop reason: SDUPTHER

## 2020-04-10 NOTE — TELEPHONE ENCOUNTER
Received request via: Patient    Was the patient seen in the last year in this department? Yes    Does the patient have an active prescription (recently filled or refills available) for medication(s) requested? No     Patient has recently moved and does not use [facility] in [place] anymore.

## 2020-04-13 ENCOUNTER — TELEPHONE (OUTPATIENT)
Dept: HEMATOLOGY ONCOLOGY | Facility: MEDICAL CENTER | Age: 65
End: 2020-04-13

## 2020-04-13 NOTE — TELEPHONE ENCOUNTER
Patient called in to let us know that she might not be able to come into her upcoming appt. Does not currently have a ride to get here for her appt. Also patient states they feel great with her medication at the moment. Patient states they will keep us updated if they are not able to make her appt.

## 2020-04-24 ENCOUNTER — TELEPHONE (OUTPATIENT)
Dept: HEALTH INFORMATION MANAGEMENT | Facility: OTHER | Age: 65
End: 2020-04-24

## 2020-04-24 NOTE — TELEPHONE ENCOUNTER
1. Caller Name: Dougie                        Call Back Number: mobile  [facility] PCP or Specialty Provider: Yes Yomaira        2.  Does patient have any active symptoms of respiratory illness (fever OR cough OR shortness of breath OR sore throat)? No. Nasal congestion. Going to buy Claritin. Some wheezing, not new, smoker.      3.  Does patient have any comoribidities? None     4.  Has the patient traveled in the last 14 days OR had any known contact with someone who is suspected or confirmed to have COVID-19?  No.    5. Disposition: Advised to perform self care, monitor for worsening symptoms and to call back in 3 days if no improvement    Note routed to [facility] Provider: DONNA only.

## 2020-05-11 DIAGNOSIS — R06.2 WHEEZING: ICD-10-CM

## 2020-05-11 RX ORDER — ALBUTEROL SULFATE 90 UG/1
2 AEROSOL, METERED RESPIRATORY (INHALATION) EVERY 6 HOURS PRN
Qty: 8.5 G | Refills: 11 | Status: SHIPPED | OUTPATIENT
Start: 2020-05-11 | End: 2021-01-01

## 2020-05-13 ENCOUNTER — TELEPHONE (OUTPATIENT)
Dept: MEDICAL GROUP | Facility: MEDICAL CENTER | Age: 65
End: 2020-05-13

## 2020-05-13 NOTE — TELEPHONE ENCOUNTER
1. Name: Dougie      Call Back Number: [phone]  How would the patient prefer to be contacted with a response: Phone call OK to leave a detailed message    Dougie called and just wanted Dr Burnette to know that she stopped taking Sudafed per advice of Pharmacist due to other medications she is taking and advised her to take Claritin for her allergies instead.

## 2020-05-28 ENCOUNTER — TELEPHONE (OUTPATIENT)
Dept: MEDICAL GROUP | Facility: MEDICAL CENTER | Age: 65
End: 2020-05-28

## 2020-05-28 NOTE — LETTER
May 29, 2020    Re:    Dougie Baptiste  [address]  [facility] [address]        Dougie Baptiste is cleared for dental surgical procedure at this time.                Sincerely,        Ernesto Burnette MD    Electronically Signed

## 2020-05-28 NOTE — TELEPHONE ENCOUNTER
Patient calling in to request letter to see the dentist. Patient states she has an appointment with [facility] dental on 06/22/20. She states they would like a letter that states it is okay to work on her before her hip surgery due to her current medication she is on.   Patient states no rush, call her if we have any further questions.

## 2020-05-29 NOTE — TELEPHONE ENCOUNTER
What hip surgery is she anticipating?  Not sure where the concern for caution is coming from on part of the dentist?

## 2020-05-29 NOTE — TELEPHONE ENCOUNTER
Pt is having a total hip replacement at [facility] after her a few months after her dental surgery.  They are asking if it is okay to do oral surgery with her being on her cancer medication.  [facility]

## 2020-06-08 DIAGNOSIS — Z17.0 MALIGNANT NEOPLASM OF NIPPLE OF LEFT BREAST IN FEMALE, ESTROGEN RECEPTOR POSITIVE (HCC): ICD-10-CM

## 2020-06-08 DIAGNOSIS — C50.012 MALIGNANT NEOPLASM OF NIPPLE OF LEFT BREAST IN FEMALE, ESTROGEN RECEPTOR POSITIVE (HCC): ICD-10-CM

## 2020-06-08 RX ORDER — ALENDRONATE SODIUM 70 MG/1
70 TABLET ORAL
Qty: 12 TAB | Refills: 0 | Status: SHIPPED | OUTPATIENT
Start: 2020-06-08 | End: 2020-07-16

## 2020-06-08 RX ORDER — ASCORBIC ACID 500 MG
1000 TABLET ORAL DAILY
Qty: 180 TAB | Refills: 3 | Status: SHIPPED | OUTPATIENT
Start: 2020-06-08 | End: 2020-09-10 | Stop reason: SDUPTHER

## 2020-06-24 ENCOUNTER — TELEPHONE (OUTPATIENT)
Dept: HEALTH INFORMATION MANAGEMENT | Facility: OTHER | Age: 65
End: 2020-06-24

## 2020-06-24 NOTE — TELEPHONE ENCOUNTER
1. Caller Name: self                Call Back Number: home  [facility] PCP or Specialty Provider: Yes Yomaira /  Juan Jose / Hematologist    Has appts on 7/1         2.  In the last two weeks, has the patient had any new or worsening symptoms (not explained by alternative diagnosis)? Yes, the patient reports the following COVID-19 consistent symptoms: chills, muscle pain or body aches, headache, nausea, vomiting and diarrhea. Started on 6/20. Was ill for 3 days. S/S have resolved. Just feels a bit tired still. Denies any current s/s.     3.  Does patient have any comoribidities? Immunosuppressed Active tx for cancer    4.  Has the patient traveled in the last 14 days OR had any known contact with someone who is suspected or confirmed to have COVID-19?  Delivered food to elderly shut-in 6/19.  Became ill on 6/20.  No other exposure she is aware of    5. POTENTIAL PUI (LOW): Advised to perform self care, monitor for worsening symptoms and to call back in 3 days if no improvement. Instructed to self isolate and contact [facility] at [phone]     No further s/s. Advised rest, fluids, quarantine until 7/7.     Tx call to scheduling to change appts to after 7/6.    Note routed to [facility] Provider: DONNA only.

## 2020-06-25 ENCOUNTER — APPOINTMENT (OUTPATIENT)
Dept: HEMATOLOGY ONCOLOGY | Facility: MEDICAL CENTER | Age: 65
End: 2020-06-25
Payer: MEDICAID

## 2020-07-17 ENCOUNTER — OFFICE VISIT (OUTPATIENT)
Dept: HEMATOLOGY ONCOLOGY | Facility: MEDICAL CENTER | Age: 65
End: 2020-07-17
Payer: MEDICAID

## 2020-07-17 VITALS
TEMPERATURE: 98.9 F | DIASTOLIC BLOOD PRESSURE: 62 MMHG | HEART RATE: 82 BPM | RESPIRATION RATE: 16 BRPM | HEIGHT: 62 IN | SYSTOLIC BLOOD PRESSURE: 132 MMHG | WEIGHT: 113.76 LBS | BODY MASS INDEX: 20.93 KG/M2 | OXYGEN SATURATION: 97 %

## 2020-07-17 DIAGNOSIS — Z17.0 MALIGNANT NEOPLASM OF NIPPLE OF LEFT BREAST IN FEMALE, ESTROGEN RECEPTOR POSITIVE (HCC): ICD-10-CM

## 2020-07-17 DIAGNOSIS — Z51.81 ENCOUNTER FOR MONITORING ANASTROZOLE THERAPY: ICD-10-CM

## 2020-07-17 DIAGNOSIS — Z79.811 ENCOUNTER FOR MONITORING ANASTROZOLE THERAPY: ICD-10-CM

## 2020-07-17 DIAGNOSIS — C50.012 MALIGNANT NEOPLASM OF NIPPLE OF LEFT BREAST IN FEMALE, ESTROGEN RECEPTOR POSITIVE (HCC): ICD-10-CM

## 2020-07-17 DIAGNOSIS — M85.852 OSTEOPENIA OF NECK OF LEFT FEMUR: ICD-10-CM

## 2020-07-17 PROCEDURE — 99214 OFFICE O/P EST MOD 30 MIN: CPT | Performed by: NURSE PRACTITIONER

## 2020-07-17 RX ORDER — ANASTROZOLE 1 MG/1
1 TABLET ORAL DAILY
Qty: 90 TAB | Refills: 1 | Status: SHIPPED | OUTPATIENT
Start: 2020-07-17 | End: 2021-04-30 | Stop reason: SDUPTHER

## 2020-07-17 ASSESSMENT — ENCOUNTER SYMPTOMS
WHEEZING: 0
PALPITATIONS: 0
NAUSEA: 0
COUGH: 0
CONSTIPATION: 0
VOMITING: 0
HEADACHES: 0
SHORTNESS OF BREATH: 0
WEIGHT LOSS: 0
DIZZINESS: 1
MYALGIAS: 0
FALLS: 1
DIARRHEA: 0
FEVER: 0
CHILLS: 0
INSOMNIA: 0

## 2020-07-17 ASSESSMENT — FIBROSIS 4 INDEX: FIB4 SCORE: 3.19

## 2020-07-17 ASSESSMENT — PAIN SCALES - GENERAL: PAINLEVEL: NO PAIN

## 2020-07-17 NOTE — PROGRESS NOTES
Subjective:      Dougie Baptiste is a 65 y.o. female who presents with Breast Cancer (3 Month Fv)      HPI   Ms. Baptiste presents for evaluation of anastrozole for continued treatment of neglected stage IIIb (yT4, N1, M0), grade 2, invasive ductal carcinoma of the left breast: ER strongly positive, KY 10-20%, Ki-67 95%, HER-2 negative. Patient is unaccompanied for today's visit.    Patient reportedly noted left breast mass in 2010 but delayed follow-up and missed multiple appointments. She ultimately underwent breast biopsy and established care in our office in 8/2018 and initiated anastrozole. She was referred to radiation oncology but did not follow-up. Lesion slowly improved with continued anastrozole and she ultimately underwent left modified radical mastectomy with axillary dissection on 12/27/2019 per Dr. Ingram.    Patient reports she is healing well from surgery and states associated nodularity at scarring is slowly improving. She experienced intermittent dizziness and swelling associated with meloxicam which has been discontinued. She continues with right hip pain and is being evaluated for right hip replacement. She is due for dental follow-up which needs to be completed prior to hip replacement. She continues tolerating anastrozole without issue and is otherwise asymptomatic.          Allergies   Allergen Reactions   • Meloxicam Hives     Everything ichty bumps everywhere per pt    • Other Misc Runny Nose and Itching     Cats, dust, and pollen    • Versed Vomiting and Nausea           Current Outpatient Medications on File Prior to Visit   Medication Sig Dispense Refill   • Prenatal Vit-Fe Fumarate-FA (PRENATAL 1+1 PO) Take  by mouth.     • Ibuprofen (ADVIL PO) Take 500 mg by mouth as needed.     • alendronate (FOSAMAX) 70 MG Tab TAKE ONE TABLET BY MOUTH EVERY 7 DAYS ON MONDAY 12 Tab 3   • Calcium 600-400 MG-UNIT Chew Tab Take 1 tablet by mouth every day. 90 Tab 3   • ascorbic acid (ASCORBIC ACID) 500 MG Tab  "Take 2 Tabs by mouth every day. 180 Tab 3   • albuterol (PROVENTIL HFA) 108 (90 Base) MCG/ACT Aero Soln inhalation aerosol Inhale 2 Puffs by mouth every 6 hours as needed for Shortness of Breath. 8.5 g 11   • meloxicam (MOBIC) 7.5 MG Tab Take 1 Tab by mouth every day. (Patient not taking: Reported on 7/17/2020) 30 Tab 2     No current facility-administered medications on file prior to visit.            Review of Systems   Constitutional: Negative for chills, fever, malaise/fatigue and weight loss (up 6 lbs).   HENT:        Dental appt on Monday   Respiratory: Negative for cough, shortness of breath and wheezing.    Cardiovascular: Positive for leg swelling (with meloxicam - better now). Negative for chest pain and palpitations.   Gastrointestinal: Negative for constipation (Last BM this am), diarrhea, nausea and vomiting.        'stomach flu' late June   Genitourinary: Negative for dysuria.   Musculoskeletal: Positive for falls (in early june - dizziness due to meloxicam) and joint pain (due for R hip replacement - after teeth repaired). Negative for myalgias.   Neurological: Positive for dizziness (better since d/c on meloxicam) and tingling (RUE due to h/o injury in 6/2017, stable). Negative for headaches.   Endo/Heme/Allergies: Positive for environmental allergies.   Psychiatric/Behavioral: The patient does not have insomnia.           Objective:     /62 (BP Location: Right arm, Patient Position: Sitting, BP Cuff Size: Adult)   Pulse 82   Temp 37.2 °C (98.9 °F) (Temporal)   Resp 16   Ht 1.575 m (5' 2\")   Wt 51.6 kg (113 lb 12.1 oz) Comment: with shoes  SpO2 97%   BMI 20.81 kg/m²        Physical Exam  Vitals signs reviewed.   Constitutional:       General: She is not in acute distress.     Appearance: She is well-developed. She is not diaphoretic.   HENT:      Head: Normocephalic and atraumatic.      Mouth/Throat:      Pharynx: No oropharyngeal exudate.   Eyes:      General: No scleral icterus.        " Right eye: No discharge.         Left eye: No discharge.      Conjunctiva/sclera: Conjunctivae normal.      Pupils: Pupils are equal, round, and reactive to light.   Neck:      Musculoskeletal: Normal range of motion and neck supple.      Thyroid: No thyromegaly.   Cardiovascular:      Rate and Rhythm: Normal rate and regular rhythm.      Heart sounds: Normal heart sounds. No murmur. No friction rub. No gallop.    Pulmonary:      Effort: Pulmonary effort is normal. No respiratory distress.      Breath sounds: Normal breath sounds. No wheezing.   Chest:      Breasts:         Right: Normal. No swelling, bleeding, inverted nipple, mass, nipple discharge, skin change or tenderness.         Left: Absent.          Comments: R axilla nodule since childhood; pea sized scarring vs. Nodularity at left lateral aspect of surgical scarring and center chest - patient states both are decreasing in size each visit with Dr. Ingram  Abdominal:      General: Bowel sounds are normal. There is no distension.      Palpations: Abdomen is soft.      Tenderness: There is no abdominal tenderness.   Musculoskeletal: Normal range of motion.         General: Deformity (R hand contracture, h/o injury 6/2017) present. No tenderness.   Lymphadenopathy:      Head:      Right side of head: No submental, submandibular, tonsillar, preauricular, posterior auricular or occipital adenopathy.      Left side of head: No submental, submandibular, tonsillar, preauricular, posterior auricular or occipital adenopathy.      Cervical: No cervical adenopathy.      Right cervical: No superficial or deep cervical adenopathy.     Left cervical: No superficial or deep cervical adenopathy.      Upper Body:      Right upper body: Axillary adenopathy (scar/chronic nodule - since childhood) present. No supraclavicular, pectoral or epitrochlear adenopathy.      Left upper body: No supraclavicular, axillary, pectoral or epitrochlear adenopathy.   Skin:     General: Skin is  warm and dry.      Coloration: Skin is not pale.      Findings: No erythema or rash.   Neurological:      Mental Status: She is alert and oriented to person, place, and time.   Psychiatric:         Behavior: Behavior normal.         Labs not completed prior to visit       DEXA  10/19/2018 8:45 AM     HISTORY/REASON FOR EXAM:  Postmenopausal, tobacco and alcohol use, osteoarthritis of right hip, breast cancer     TECHNIQUE/EXAM DESCRIPTION AND NUMBER OF VIEWS:   Dual x-ray bone densitometry was performed from the L1 and L2 levels and from the proximal left femur utilizing the GE Prodigy unit.     COMPARISON:   None     FINDINGS:   The mean bone mineral density for the lumbar spine is 1.119 g/cm2, which corresponds to a T score of -0.4 and a Z score of 1.0.     The proximal left femur has a mean bone mineral density of 0.801 g/cm2, with a T score of -1.6 and a Z score of -0.6.       Impression        According to the World Health Organization classification, bone mineral density of this patient is osteopenia with increased risk of fracture .         10-year Probability of Fracture:   Major Osteoporotic     19.5%   Hip     3.2%   Population      [place] ()     Based on left femur neck BMD                 Assessment/Plan:     1. Malignant neoplasm of nipple of left breast in female, estrogen receptor positive (HCC)  anastrozole (ARIMIDEX) 1 MG Tab   2. Encounter for monitoring anastrozole therapy     3. Osteopenia of neck of left femur         1.  Osteopenia: Patient continues on weekly alendronate per PCP.  She declines reevaluation with DEXA scan when due in October, given ongoing work-up for right hip replacement and she anticipates that being completed prior to surgery.  She will continue with weekly alendronate as ordered.    2.  Breast cancer: Patient with noted lesion in 2010 and establish care in 8/2018.  She was initiated on anastrozole, did not received XRT, and was ultimately able to undergo left  mastectomy in 12/2019.  She is recovering from surgery very well and continues with daily anastrozole, without issue.  She declines mammography of her right breast and will return for reevaluation in 6 months, sooner as needed.            The patient verbalized agreement and understanding of current plan. All questions and concerns were addressed at time of visit.    Please note that this dictation was created using voice recognition software. I have made every reasonable attempt to correct obvious errors, but I expect that there are errors of grammar and possibly content that I did not discover before finalizing the note.

## 2020-07-18 ASSESSMENT — ENCOUNTER SYMPTOMS: TINGLING: 1

## 2020-08-20 ENCOUNTER — TELEPHONE (OUTPATIENT)
Dept: MEDICAL GROUP | Facility: MEDICAL CENTER | Age: 65
End: 2020-08-20

## 2020-08-21 DIAGNOSIS — M16.11 PRIMARY OSTEOARTHRITIS OF RIGHT HIP: ICD-10-CM

## 2020-08-21 NOTE — TELEPHONE ENCOUNTER
Pt lvm stating she needs a letter stating it is ok to have her teeth cleaned, please advise, thank you

## 2020-08-27 ENCOUNTER — TELEPHONE (OUTPATIENT)
Dept: MEDICAL GROUP | Facility: MEDICAL CENTER | Age: 65
End: 2020-08-27

## 2020-08-27 DIAGNOSIS — M16.11 PRIMARY OSTEOARTHRITIS OF RIGHT HIP: ICD-10-CM

## 2020-09-10 DIAGNOSIS — Z17.0 MALIGNANT NEOPLASM OF NIPPLE OF LEFT BREAST IN FEMALE, ESTROGEN RECEPTOR POSITIVE (HCC): ICD-10-CM

## 2020-09-10 DIAGNOSIS — C50.012 MALIGNANT NEOPLASM OF NIPPLE OF LEFT BREAST IN FEMALE, ESTROGEN RECEPTOR POSITIVE (HCC): ICD-10-CM

## 2020-09-10 RX ORDER — ASCORBIC ACID 500 MG
1000 TABLET ORAL DAILY
Qty: 180 TAB | Refills: 3 | Status: SHIPPED | OUTPATIENT
Start: 2020-09-10

## 2020-11-30 DIAGNOSIS — M16.11 PRIMARY OSTEOARTHRITIS OF RIGHT HIP: ICD-10-CM

## 2021-01-01 ENCOUNTER — TELEPHONE (OUTPATIENT)
Dept: HEMATOLOGY ONCOLOGY | Facility: MEDICAL CENTER | Age: 66
End: 2021-01-01

## 2021-01-01 ENCOUNTER — OFFICE VISIT (OUTPATIENT)
Dept: HEMATOLOGY ONCOLOGY | Facility: MEDICAL CENTER | Age: 66
End: 2021-01-01
Payer: MEDICARE

## 2021-01-01 ENCOUNTER — PRE-ADMISSION TESTING (OUTPATIENT)
Dept: ADMISSIONS | Facility: MEDICAL CENTER | Age: 66
End: 2021-01-01
Attending: ORTHOPAEDIC SURGERY
Payer: MEDICARE

## 2021-01-01 ENCOUNTER — PATIENT OUTREACH (OUTPATIENT)
Dept: OTHER | Facility: MEDICAL CENTER | Age: 66
End: 2021-01-01

## 2021-01-01 ENCOUNTER — HOSPITAL ENCOUNTER (OUTPATIENT)
Dept: LAB | Facility: MEDICAL CENTER | Age: 66
End: 2021-10-19
Attending: NURSE PRACTITIONER
Payer: MEDICARE

## 2021-01-01 ENCOUNTER — APPOINTMENT (OUTPATIENT)
Dept: HEMATOLOGY ONCOLOGY | Facility: MEDICAL CENTER | Age: 66
End: 2021-01-01
Payer: MEDICARE

## 2021-01-01 ENCOUNTER — HOSPITAL ENCOUNTER (OUTPATIENT)
Dept: LAB | Facility: MEDICAL CENTER | Age: 66
End: 2021-10-19
Attending: ORTHOPAEDIC SURGERY
Payer: MEDICARE

## 2021-01-01 VITALS
DIASTOLIC BLOOD PRESSURE: 78 MMHG | SYSTOLIC BLOOD PRESSURE: 178 MMHG | WEIGHT: 98.77 LBS | BODY MASS INDEX: 16.46 KG/M2 | HEART RATE: 98 BPM | RESPIRATION RATE: 16 BRPM | TEMPERATURE: 98.6 F | OXYGEN SATURATION: 98 % | HEIGHT: 65 IN

## 2021-01-01 DIAGNOSIS — Z79.811 ENCOUNTER FOR MONITORING ANASTROZOLE THERAPY: ICD-10-CM

## 2021-01-01 DIAGNOSIS — C50.912 BREAST CANCER, STAGE 3, LEFT (HCC): ICD-10-CM

## 2021-01-01 DIAGNOSIS — Z71.89 ENCOUNTER FOR MEDICATION REVIEW AND COUNSELING: ICD-10-CM

## 2021-01-01 DIAGNOSIS — M16.11 PRIMARY OSTEOARTHRITIS OF RIGHT HIP: ICD-10-CM

## 2021-01-01 DIAGNOSIS — Z17.0 MALIGNANT NEOPLASM OF NIPPLE OF LEFT BREAST IN FEMALE, ESTROGEN RECEPTOR POSITIVE (HCC): ICD-10-CM

## 2021-01-01 DIAGNOSIS — C50.912 BREAST CANCER, STAGE 3, LEFT (HCC): Primary | ICD-10-CM

## 2021-01-01 DIAGNOSIS — M25.551 RIGHT HIP PAIN: ICD-10-CM

## 2021-01-01 DIAGNOSIS — M85.852 OSTEOPENIA OF NECK OF LEFT FEMUR: ICD-10-CM

## 2021-01-01 DIAGNOSIS — T45.1X5A CHEMOTHERAPY-INDUCED NAUSEA: ICD-10-CM

## 2021-01-01 DIAGNOSIS — R11.0 CHEMOTHERAPY-INDUCED NAUSEA: ICD-10-CM

## 2021-01-01 DIAGNOSIS — C79.51 BONY METASTASIS: ICD-10-CM

## 2021-01-01 DIAGNOSIS — C50.012 MALIGNANT NEOPLASM OF NIPPLE OF LEFT BREAST IN FEMALE, ESTROGEN RECEPTOR POSITIVE (HCC): ICD-10-CM

## 2021-01-01 DIAGNOSIS — R52 PAIN: ICD-10-CM

## 2021-01-01 DIAGNOSIS — Z01.818 PRE-OP EXAMINATION: ICD-10-CM

## 2021-01-01 DIAGNOSIS — Z51.81 ENCOUNTER FOR MONITORING ANASTROZOLE THERAPY: ICD-10-CM

## 2021-01-01 LAB
ALBUMIN SERPL BCP-MCNC: 3.7 G/DL (ref 3.2–4.9)
ALBUMIN SERPL BCP-MCNC: 3.7 G/DL (ref 3.2–4.9)
ALBUMIN/GLOB SERPL: 1.2 G/DL
ALBUMIN/GLOB SERPL: 1.2 G/DL
ALP SERPL-CCNC: 123 U/L (ref 30–99)
ALP SERPL-CCNC: 127 U/L (ref 30–99)
ALT SERPL-CCNC: 9 U/L (ref 2–50)
ALT SERPL-CCNC: 9 U/L (ref 2–50)
ANION GAP SERPL CALC-SCNC: 16 MMOL/L (ref 7–16)
ANION GAP SERPL CALC-SCNC: 16 MMOL/L (ref 7–16)
APTT PPP: 30.8 SEC (ref 24.7–36)
AST SERPL-CCNC: 34 U/L (ref 12–45)
AST SERPL-CCNC: 34 U/L (ref 12–45)
BASOPHILS # BLD AUTO: 1.1 % (ref 0–1.8)
BASOPHILS # BLD AUTO: 1.5 % (ref 0–1.8)
BASOPHILS # BLD: 0.07 K/UL (ref 0–0.12)
BASOPHILS # BLD: 0.1 K/UL (ref 0–0.12)
BILIRUB SERPL-MCNC: 0.4 MG/DL (ref 0.1–1.5)
BILIRUB SERPL-MCNC: 0.5 MG/DL (ref 0.1–1.5)
BUN SERPL-MCNC: 5 MG/DL (ref 8–22)
BUN SERPL-MCNC: 5 MG/DL (ref 8–22)
CALCIUM SERPL-MCNC: 10 MG/DL (ref 8.4–10.2)
CALCIUM SERPL-MCNC: 10 MG/DL (ref 8.4–10.2)
CHLORIDE SERPL-SCNC: 104 MMOL/L (ref 96–112)
CHLORIDE SERPL-SCNC: 105 MMOL/L (ref 96–112)
CO2 SERPL-SCNC: 19 MMOL/L (ref 20–33)
CO2 SERPL-SCNC: 19 MMOL/L (ref 20–33)
CREAT SERPL-MCNC: 0.38 MG/DL (ref 0.5–1.4)
CREAT SERPL-MCNC: 0.39 MG/DL (ref 0.5–1.4)
EOSINOPHIL # BLD AUTO: 0.18 K/UL (ref 0–0.51)
EOSINOPHIL # BLD AUTO: 0.2 K/UL (ref 0–0.51)
EOSINOPHIL NFR BLD: 2.8 % (ref 0–6.9)
EOSINOPHIL NFR BLD: 3.2 % (ref 0–6.9)
ERYTHROCYTE [DISTWIDTH] IN BLOOD BY AUTOMATED COUNT: 64.5 FL (ref 35.9–50)
ERYTHROCYTE [DISTWIDTH] IN BLOOD BY AUTOMATED COUNT: 64.7 FL (ref 35.9–50)
GLOBULIN SER CALC-MCNC: 3.2 G/DL (ref 1.9–3.5)
GLOBULIN SER CALC-MCNC: 3.2 G/DL (ref 1.9–3.5)
GLUCOSE SERPL-MCNC: 73 MG/DL (ref 65–99)
GLUCOSE SERPL-MCNC: 73 MG/DL (ref 65–99)
HCT VFR BLD AUTO: 40.5 % (ref 37–47)
HCT VFR BLD AUTO: 40.7 % (ref 37–47)
HGB BLD-MCNC: 13.3 G/DL (ref 12–16)
HGB BLD-MCNC: 13.4 G/DL (ref 12–16)
HIV 1+2 AB+HIV1 P24 AG SERPL QL IA: NORMAL
IMM GRANULOCYTES # BLD AUTO: 0.01 K/UL (ref 0–0.11)
IMM GRANULOCYTES # BLD AUTO: 0.01 K/UL (ref 0–0.11)
IMM GRANULOCYTES NFR BLD AUTO: 0.2 % (ref 0–0.9)
IMM GRANULOCYTES NFR BLD AUTO: 0.2 % (ref 0–0.9)
INR PPP: 0.97 (ref 0.87–1.13)
LYMPHOCYTES # BLD AUTO: 2.83 K/UL (ref 1–4.8)
LYMPHOCYTES # BLD AUTO: 2.96 K/UL (ref 1–4.8)
LYMPHOCYTES NFR BLD: 44.7 % (ref 22–41)
LYMPHOCYTES NFR BLD: 45.5 % (ref 22–41)
MCH RBC QN AUTO: 29.3 PG (ref 27–33)
MCH RBC QN AUTO: 29.5 PG (ref 27–33)
MCHC RBC AUTO-ENTMCNC: 32.7 G/DL (ref 33.6–35)
MCHC RBC AUTO-ENTMCNC: 33.1 G/DL (ref 33.6–35)
MCV RBC AUTO: 89.2 FL (ref 81.4–97.8)
MCV RBC AUTO: 89.6 FL (ref 81.4–97.8)
MONOCYTES # BLD AUTO: 0.45 K/UL (ref 0–0.85)
MONOCYTES # BLD AUTO: 0.45 K/UL (ref 0–0.85)
MONOCYTES NFR BLD AUTO: 6.9 % (ref 0–13.4)
MONOCYTES NFR BLD AUTO: 7.1 % (ref 0–13.4)
NEUTROPHILS # BLD AUTO: 2.77 K/UL (ref 2–7.15)
NEUTROPHILS # BLD AUTO: 2.81 K/UL (ref 2–7.15)
NEUTROPHILS NFR BLD: 43.1 % (ref 44–72)
NEUTROPHILS NFR BLD: 43.7 % (ref 44–72)
NRBC # BLD AUTO: 0 K/UL
NRBC # BLD AUTO: 0 K/UL
NRBC BLD-RTO: 0 /100 WBC
NRBC BLD-RTO: 0 /100 WBC
PLATELET # BLD AUTO: 483 K/UL (ref 164–446)
PLATELET # BLD AUTO: 486 K/UL (ref 164–446)
PMV BLD AUTO: 9.7 FL (ref 9–12.9)
PMV BLD AUTO: 9.7 FL (ref 9–12.9)
POTASSIUM SERPL-SCNC: 3.7 MMOL/L (ref 3.6–5.5)
POTASSIUM SERPL-SCNC: 3.8 MMOL/L (ref 3.6–5.5)
PROT SERPL-MCNC: 6.9 G/DL (ref 6–8.2)
PROT SERPL-MCNC: 6.9 G/DL (ref 6–8.2)
PROTHROMBIN TIME: 12.1 SEC (ref 12–14.6)
RBC # BLD AUTO: 4.54 M/UL (ref 4.2–5.4)
RBC # BLD AUTO: 4.54 M/UL (ref 4.2–5.4)
SODIUM SERPL-SCNC: 139 MMOL/L (ref 135–145)
SODIUM SERPL-SCNC: 140 MMOL/L (ref 135–145)
WBC # BLD AUTO: 6.3 K/UL (ref 4.8–10.8)
WBC # BLD AUTO: 6.5 K/UL (ref 4.8–10.8)

## 2021-01-01 PROCEDURE — 85025 COMPLETE CBC W/AUTO DIFF WBC: CPT

## 2021-01-01 PROCEDURE — 85730 THROMBOPLASTIN TIME PARTIAL: CPT | Mod: GA

## 2021-01-01 PROCEDURE — 85025 COMPLETE CBC W/AUTO DIFF WBC: CPT | Mod: 91

## 2021-01-01 PROCEDURE — 99215 OFFICE O/P EST HI 40 MIN: CPT | Performed by: NURSE PRACTITIONER

## 2021-01-01 PROCEDURE — G0475 HIV COMBINATION ASSAY: HCPCS | Mod: GA

## 2021-01-01 PROCEDURE — 80053 COMPREHEN METABOLIC PANEL: CPT

## 2021-01-01 PROCEDURE — 80053 COMPREHEN METABOLIC PANEL: CPT | Mod: 91

## 2021-01-01 PROCEDURE — 85610 PROTHROMBIN TIME: CPT | Mod: GA

## 2021-01-01 PROCEDURE — 36415 COLL VENOUS BLD VENIPUNCTURE: CPT

## 2021-01-01 RX ORDER — PROCHLORPERAZINE MALEATE 10 MG
10 TABLET ORAL EVERY 6 HOURS PRN
Qty: 30 TABLET | Refills: 3 | Status: SHIPPED | OUTPATIENT
Start: 2021-01-01

## 2021-01-01 RX ORDER — ONDANSETRON 4 MG/1
4 TABLET, FILM COATED ORAL EVERY 4 HOURS PRN
Qty: 30 TABLET | Refills: 3 | Status: SHIPPED | OUTPATIENT
Start: 2021-01-01 | End: 2021-01-01

## 2021-01-01 RX ORDER — ACETAMINOPHEN 500 MG
1000 TABLET ORAL EVERY 6 HOURS PRN
COMMUNITY

## 2021-01-01 RX ORDER — ANASTROZOLE 1 MG/1
1 TABLET ORAL DAILY
Qty: 30 TABLET | Refills: 0 | Status: SHIPPED | OUTPATIENT
Start: 2021-01-01 | End: 2022-01-01 | Stop reason: SDUPTHER

## 2021-01-01 RX ORDER — ANASTROZOLE 1 MG/1
1 TABLET ORAL DAILY
Qty: 30 TABLET | Refills: 0 | Status: CANCELLED | OUTPATIENT
Start: 2021-01-01

## 2021-01-01 RX ORDER — ANASTROZOLE 1 MG/1
1 TABLET ORAL DAILY
COMMUNITY
End: 2021-01-01 | Stop reason: SDUPTHER

## 2021-01-01 RX ORDER — LETROZOLE 2.5 MG/1
2.5 TABLET, FILM COATED ORAL DAILY
Qty: 90 TABLET | Refills: 1 | Status: SHIPPED | OUTPATIENT
Start: 2021-01-01

## 2021-01-01 RX ORDER — ANASTROZOLE 1 MG/1
1 TABLET ORAL DAILY
Qty: 30 TABLET | Refills: 0 | Status: SHIPPED | OUTPATIENT
Start: 2021-01-01 | End: 2021-01-01 | Stop reason: SDUPTHER

## 2021-01-01 ASSESSMENT — FIBROSIS 4 INDEX: FIB4 SCORE: 3.24

## 2021-01-01 ASSESSMENT — ENCOUNTER SYMPTOMS
PALPITATIONS: 0
COUGH: 0
FEVER: 0
CONSTIPATION: 0
CHILLS: 0
HEADACHES: 1
SHORTNESS OF BREATH: 0
INSOMNIA: 0
WHEEZING: 0
WEIGHT LOSS: 1
NAUSEA: 0
VOMITING: 1
BLOOD IN STOOL: 0
TINGLING: 0
TREMORS: 1
MYALGIAS: 1
DIARRHEA: 0
DIZZINESS: 0

## 2021-01-01 ASSESSMENT — LIFESTYLE VARIABLES: SUBSTANCE_ABUSE: 1

## 2021-02-03 ENCOUNTER — APPOINTMENT (OUTPATIENT)
Dept: HEMATOLOGY ONCOLOGY | Facility: MEDICAL CENTER | Age: 66
End: 2021-02-03
Payer: MEDICAID

## 2021-02-19 ENCOUNTER — OFFICE VISIT (OUTPATIENT)
Dept: HEMATOLOGY ONCOLOGY | Facility: MEDICAL CENTER | Age: 66
End: 2021-02-19
Payer: MEDICAID

## 2021-02-19 VITALS
OXYGEN SATURATION: 98 % | DIASTOLIC BLOOD PRESSURE: 64 MMHG | TEMPERATURE: 98.5 F | HEIGHT: 64 IN | WEIGHT: 108.36 LBS | SYSTOLIC BLOOD PRESSURE: 132 MMHG | BODY MASS INDEX: 18.5 KG/M2 | RESPIRATION RATE: 16 BRPM | HEART RATE: 82 BPM

## 2021-02-19 DIAGNOSIS — Z51.81 ENCOUNTER FOR MONITORING ANASTROZOLE THERAPY: ICD-10-CM

## 2021-02-19 DIAGNOSIS — C50.912 BREAST CANCER, STAGE 3, LEFT (HCC): ICD-10-CM

## 2021-02-19 DIAGNOSIS — Z79.811 ENCOUNTER FOR MONITORING ANASTROZOLE THERAPY: ICD-10-CM

## 2021-02-19 DIAGNOSIS — M16.11 PRIMARY OSTEOARTHRITIS OF RIGHT HIP: ICD-10-CM

## 2021-02-19 PROCEDURE — 99214 OFFICE O/P EST MOD 30 MIN: CPT | Performed by: NURSE PRACTITIONER

## 2021-02-19 ASSESSMENT — PATIENT HEALTH QUESTIONNAIRE - PHQ9: CLINICAL INTERPRETATION OF PHQ2 SCORE: 0

## 2021-02-19 ASSESSMENT — ENCOUNTER SYMPTOMS
WEIGHT LOSS: 1
COUGH: 1
VOMITING: 0
INSOMNIA: 0
HEADACHES: 1
DIARRHEA: 0
MYALGIAS: 0
WHEEZING: 0
PALPITATIONS: 0
CHILLS: 0
SHORTNESS OF BREATH: 0
FEVER: 0
TINGLING: 0
CONSTIPATION: 1
DIZZINESS: 0
NAUSEA: 0

## 2021-02-19 ASSESSMENT — FIBROSIS 4 INDEX: FIB4 SCORE: 3.19

## 2021-02-19 NOTE — PROGRESS NOTES
Subjective:      Dougie Baptiste is a 65 y.o. female who presents with Breast Cancer (6 month FV; aromasin)            HPI   Ms. Baptiste presents for evaluation of anastrozole for continued treatment of neglected stage IIIb (yT4, N1, M0), grade 2, invasive ductal carcinoma of the left breast: ER strongly positive, DC 10-20%, Ki-67 95%, HER-2 negative. Patient is unaccompanied for today's visit.     Patient reportedly noted left breast mass in 2010 but delayed follow-up and missed multiple appointments. She ultimately underwent breast biopsy and established care in our office in 8/2018 and initiated anastrozole. She was referred to radiation oncology but did not follow-up. Lesion slowly improved with continued anastrozole and she ultimately underwent left modified radical mastectomy with axillary dissection on 12/27/2019 per Dr. Ingram.    Patient with new enlarging lesions at left chest wall - four that range from 0.5-2 cm with healed scabs on surface. She reports increased number and size since abx therapy following teeth extraction in 10/2020. She is following up with breast surgeon after this visit. She is otherwise at her baseline, in good spirits, and asymptomatic. She is emphatic that she does not want IV chemo or XRT if she is diagnosed with recurrent disease. She may be amenable to oral chemo but is absolutely willing to have a conversation about all treatment options so she can make an informed decision.           Review of Systems   Constitutional: Positive for weight loss (5 lbs down since July - diet changes since teeth extraction in 10/2020). Negative for chills, fever and malaise/fatigue.   HENT:        Stopped alendronate (per oral surg) in anticipation of oral surgery in March   Respiratory: Positive for cough (with dust; has not used inhaler recently). Negative for shortness of breath and wheezing.    Cardiovascular: Negative for chest pain, palpitations and leg swelling.   Gastrointestinal: Positive  "for constipation (relieved with prunes). Negative for diarrhea, nausea and vomiting.   Genitourinary: Negative for dysuria.   Musculoskeletal: Positive for joint pain (R hip - needs replacement; bilateral shoulder since started using 4WW (3/2020)). Negative for myalgias.   Skin:        Left chest wall nodules noticed increased size and number after abx in 10/2020 following teeth extraction (maxillary reshaping surgery scheduled for 3/11/21 - Dr. Quiñonez); R cheek \"skin cancer\" not established with derm   Neurological: Positive for headaches (weather related). Negative for dizziness and tingling.   Psychiatric/Behavioral: The patient does not have insomnia.          Allergies   Allergen Reactions   • Meloxicam Hives     Everything ichty bumps everywhere per pt    • Other Misc Runny Nose and Itching     Cats, dust, and pollen    • Versed Vomiting and Nausea           Current Outpatient Medications on File Prior to Visit   Medication Sig Dispense Refill   • Calcium Carbonate-Vitamin D 600-400 MG-UNIT Tab TAKE 1 TABLET BY MOUTH ONCE DAILY 90 Tab 0   • ascorbic acid (ASCORBIC ACID) 500 MG Tab Take 2 Tabs by mouth every day. 180 Tab 3   • Prenatal Vit-Fe Fumarate-FA (PRENATAL 1+1 PO) Take  by mouth.     • Ibuprofen (ADVIL PO) Take 500 mg by mouth as needed.     • anastrozole (ARIMIDEX) 1 MG Tab Take 1 Tab by mouth every day. 90 Tab 1   • Calcium 600-400 MG-UNIT Chew Tab Take 1 tablet by mouth every day. 90 Tab 3   • albuterol (PROVENTIL HFA) 108 (90 Base) MCG/ACT Aero Soln inhalation aerosol Inhale 2 Puffs by mouth every 6 hours as needed for Shortness of Breath. 8.5 g 11   • alendronate (FOSAMAX) 70 MG Tab TAKE ONE TABLET BY MOUTH EVERY 7 DAYS ON MONDAY (Patient not taking: Reported on 2/19/2021) 12 Tab 3   • meloxicam (MOBIC) 7.5 MG Tab Take 1 Tab by mouth every day. (Patient not taking: Reported on 7/17/2020) 30 Tab 2     No current facility-administered medications on file prior to visit.          Objective:     BP " "132/64   Pulse 82   Temp 36.9 °C (98.5 °F) (Temporal)   Resp 16   Ht 1.63 m (5' 4.17\")   Wt 49.2 kg (108 lb 5.7 oz)   SpO2 98%   BMI 18.50 kg/m²      Physical Exam  Vitals reviewed.   Constitutional:       General: She is not in acute distress.     Appearance: She is well-developed. She is not diaphoretic.      Comments: thin   HENT:      Head: Normocephalic and atraumatic.        Comments: \"skin cancer lesion\"     Mouth/Throat:      Pharynx: No oropharyngeal exudate.   Eyes:      General: No scleral icterus.        Right eye: No discharge.         Left eye: No discharge.      Conjunctiva/sclera: Conjunctivae normal.      Pupils: Pupils are equal, round, and reactive to light.   Neck:      Thyroid: No thyromegaly.   Cardiovascular:      Rate and Rhythm: Normal rate and regular rhythm.      Heart sounds: Normal heart sounds. No murmur. No friction rub. No gallop.    Pulmonary:      Effort: Pulmonary effort is normal. No respiratory distress.      Breath sounds: Normal breath sounds. No wheezing.   Chest:      Chest wall: Mass (multiple at left chest wall (midline to axilla) - presumed recurrence) present.      Breasts:         Right: Normal.         Left: Absent.       Abdominal:      General: Bowel sounds are normal. There is no distension.      Palpations: Abdomen is soft.      Tenderness: There is no abdominal tenderness.   Musculoskeletal:         General: No tenderness. Normal range of motion.      Cervical back: Normal range of motion and neck supple.      Comments: Ambulating with 4 wheeled seated walker   Lymphadenopathy:      Head:      Right side of head: No submental, submandibular, tonsillar, preauricular, posterior auricular or occipital adenopathy.      Left side of head: No submental, submandibular, tonsillar, preauricular, posterior auricular or occipital adenopathy.      Cervical: No cervical adenopathy.      Right cervical: No superficial or deep cervical adenopathy.     Left cervical: No " superficial or deep cervical adenopathy.      Upper Body:      Right upper body: Axillary adenopathy (consistent with lifelong baseline per patient) present. No supraclavicular, pectoral or epitrochlear adenopathy.      Left upper body: Axillary adenopathy and pectoral adenopathy present. No supraclavicular or epitrochlear adenopathy.   Skin:     General: Skin is warm and dry.      Coloration: Skin is not pale.      Findings: Lesion (R cheek - dime sized, amenable to derm referral in ) present. No erythema or rash.   Neurological:      Mental Status: She is alert and oriented to person, place, and time.   Psychiatric:         Behavior: Behavior normal.      Comments: Talkative, consistent with baseline         No current labs or imaging         Assessment/Plan:        1. Breast cancer, stage 3, left (HCC)     2. Encounter for monitoring anastrozole therapy     3. Primary osteoarthritis of right hip         1.  Osteopenia: Patient prescribed weekly alendronate per PCP. She has discontinued in anticipation of oral surgery on 3/11/21.     2.  Breast cancer: Patient with left breast lesion in 2010 but did not establish care until 8/2018. She was initiated on anastrozole, did not received XRT, and was ultimately able to undergo left mastectomy in 12/2019. She continues with daily anastrozole, without issue. She declines mammography of her right breast.    New lesions are noted at left chest wall, presumed recurrence. She was scheduled for follow up with Dr. Ingram after our visit - confirmed presumed recurrence with Dr. Ingram who recommend XRT the chest wall and will refer patient to [facility], as it is closer to patient's home. Patient will continue with daily anastrozole and return to office in 4-6 weeks (after completion of oral surgery in March) for further treatment planning with Dr. Whiting, sooner as needed.         The patient verbalized agreement and understanding of current plan. All questions and concerns  were addressed at time of visit.    Please note that this dictation was created using voice recognition software. I have made every reasonable attempt to correct obvious errors, but I expect that there are errors of grammar and possibly content that I did not discover before finalizing the note.

## 2021-03-01 ENCOUNTER — PATIENT OUTREACH (OUTPATIENT)
Dept: OTHER | Facility: MEDICAL CENTER | Age: 66
End: 2021-03-01

## 2021-03-01 NOTE — PROGRESS NOTES
Oncology Nurse Navigation  Pt phoned to provide update.  She states she is now living in an apartment in [place].  She had a left radical mastectomy on 12/23/2019.  She was recently seen by ROGER Dunn with [facility].  She states she was referred by Dr. Ingram to Dr. Reyes in [place] for radiation consultation.  She states she is scheduled for maxillary reshaping surgery on 3/11/21.  She would like to prioritize her hip replacement over her breast cancer care to improve her quality of life.  She would like a referral to [facility] to establish care with one of their providers.    She states she will have Medicare at the end of the month and she will now have to pay out of pocket for her MVI.    Pt denies barriers at this time.  She states she will call after her oral surgery with an update on her plan of care.

## 2021-03-11 ENCOUNTER — PATIENT OUTREACH (OUTPATIENT)
Dept: OTHER | Facility: MEDICAL CENTER | Age: 66
End: 2021-03-11

## 2021-03-11 NOTE — PROGRESS NOTES
"Pt called reporting wanted to provide update.  Asked if she wanted to talk to her navigator or leave a message with this navigator.  Pt reporting leave message update.  Pt went into detailed information on her alveoplasty for her teeth.  Pt states was given \"norco\" and takes 1/2 a pill to help with the pain.  She provided information that her mouth is full of sutures.  She does have another appointment on April 6th for \"impressions for her teeth\".  Asked patient about plan after that.  Pt states plans on radiation and chemo and has an appointment at end of April.  Pt reports eats boiled plums so currently no issues with constipation.  Pt added she had gotten a hospital bed for $40.  Pt providing additional comments and detail.  Pt stating she was doing good and \"kicking butt\".  Pt reported no needs from navigator and will follow back up next month.  Pt has contact information if needs arise.  Will pass information on to her nurse navigator, Bren.  "

## 2021-04-01 ENCOUNTER — TELEPHONE (OUTPATIENT)
Dept: HEMATOLOGY ONCOLOGY | Facility: MEDICAL CENTER | Age: 66
End: 2021-04-01

## 2021-04-01 DIAGNOSIS — C50.912 BREAST CANCER, STAGE 3, LEFT (HCC): ICD-10-CM

## 2021-04-01 DIAGNOSIS — K08.9 POOR DENTITION: ICD-10-CM

## 2021-04-01 NOTE — TELEPHONE ENCOUNTER
Called and spoke with pt, she stated that she has followed up with [facility] about XRT. Pt stated she will be proceeding with radiation once her teeth are fixed.

## 2021-04-01 NOTE — TELEPHONE ENCOUNTER
"Patient called into the clinic to cancel their appointment. Attempted to reschedule Pt but they did not want to make another appointment. They stated that they want to focus on getting their teeth since it has been 5 months since they had teeth. Patient will call the clinic when they are ready to schedule. Pt also stated that \"maybe will do radiation\". Pt also states they want to drake this by eating clean food. As the conversation ended, pt wants to see if Dr. Whiting could create a referral for them to see a nutritionist. Informed Pt I would relay this information. Patient verbally acknowledged.   "

## 2021-04-05 ENCOUNTER — DOCUMENTATION (OUTPATIENT)
Dept: NUTRITION | Facility: MEDICAL CENTER | Age: 66
End: 2021-04-05

## 2021-04-05 NOTE — PROGRESS NOTES
Nutrition Services: [place] for Cancer Referral  66 year old female with diagnosis of breast cancer stage 3, left on hormone therapy.  Referral received for nutrition services. RD will attempt to contact per policy for nutrition assessment.    Please contact as needed.  [phone]

## 2021-04-08 ENCOUNTER — PATIENT OUTREACH (OUTPATIENT)
Dept: OTHER | Facility: MEDICAL CENTER | Age: 66
End: 2021-04-08

## 2021-04-08 ENCOUNTER — TELEPHONE (OUTPATIENT)
Dept: HEMATOLOGY ONCOLOGY | Facility: MEDICAL CENTER | Age: 66
End: 2021-04-08

## 2021-04-08 NOTE — TELEPHONE ENCOUNTER
Patient cancelled appointment. Pt states they are going to radiation on 04/27/2021 and want to come in after their visit with radiation. Attempted to reschedule patient but, patient does not have a ride to come into the clinic. Patient states they will call the office when they are able to schedule.

## 2021-04-09 ENCOUNTER — PATIENT OUTREACH (OUTPATIENT)
Dept: OTHER | Facility: MEDICAL CENTER | Age: 66
End: 2021-04-09

## 2021-04-09 ENCOUNTER — DOCUMENTATION (OUTPATIENT)
Dept: HEMATOLOGY ONCOLOGY | Facility: MEDICAL CENTER | Age: 66
End: 2021-04-09

## 2021-04-09 NOTE — PROGRESS NOTES
"Late entry:  4/9/21  1256: Pt phones stating she had her dental impression done and has another appointment on 4/22/2021.  She expresses concern about driving to [place] for her appointment with Dr. Whiting on 4/21/2021 and states she will call today to reschedule.  She is scheduled to be seen at [facility] for radiation consultation on 4/27/2021.    She reports she has \"bumps and scabbing\" which she feels are signs of healing.    "

## 2021-04-09 NOTE — PROGRESS NOTES
Nutrition Services: Update on consult  It appears that patient has canceled all oncology appointments within [facility] and will be transferring care to [facility].  I have called RD, Chica Garcia, at [facility] to discuss consult - left message for Chica.  Given that patient not receiving treatment within [facility], will defer consult to [facility] and staff.  RD is available PRN  [phone]

## 2021-04-09 NOTE — PROGRESS NOTES
Pt phones stating she rescheduled her appointment for 5/2/2021.  No date in Epic.   Clarified with pt that she cancelled her appointments and rescheduled her radiation consult with Dr. Silver for 5/2/2021.  Emphasized importance of keeping appointments.  Pt states she understands.  Pt would like to talk to dietitian.  Encouraged pt to follow up with RD at [facility] when she establishes care there.

## 2021-04-13 ENCOUNTER — PATIENT OUTREACH (OUTPATIENT)
Dept: OTHER | Facility: MEDICAL CENTER | Age: 66
End: 2021-04-13

## 2021-04-13 NOTE — PROGRESS NOTES
"Pt called reporting that \"nutritionist\" was going to be calling her to provide information.  Pt requesting printed information be mailed to her in order to review.  She stated next appointment May 6th.  Pt provided address, will pass on information to dietitian.  Pt grateful and thanking navigator.  "

## 2021-04-21 ENCOUNTER — APPOINTMENT (OUTPATIENT)
Dept: HEMATOLOGY ONCOLOGY | Facility: MEDICAL CENTER | Age: 66
End: 2021-04-21
Payer: MEDICARE

## 2021-04-30 ENCOUNTER — TELEPHONE (OUTPATIENT)
Dept: HEMATOLOGY ONCOLOGY | Facility: MEDICAL CENTER | Age: 66
End: 2021-04-30

## 2021-04-30 DIAGNOSIS — Z17.0 MALIGNANT NEOPLASM OF NIPPLE OF LEFT BREAST IN FEMALE, ESTROGEN RECEPTOR POSITIVE (HCC): ICD-10-CM

## 2021-04-30 DIAGNOSIS — C50.012 MALIGNANT NEOPLASM OF NIPPLE OF LEFT BREAST IN FEMALE, ESTROGEN RECEPTOR POSITIVE (HCC): ICD-10-CM

## 2021-04-30 RX ORDER — ANASTROZOLE 1 MG/1
1 TABLET ORAL DAILY
Qty: 90 TABLET | Refills: 1 | Status: SHIPPED | OUTPATIENT
Start: 2021-04-30 | End: 2021-01-01

## 2021-05-13 NOTE — TELEPHONE ENCOUNTER
Rec'vd call from pt with c/o shooting pain from her right hip to right shoulder & is currently using a walker for ambulation.  Pt requests a referral to [facility] in [place].

## 2021-05-13 NOTE — TELEPHONE ENCOUNTER
After speaking with Sara DURAN, called pt back to f/u if pt is scheduled for radiation as she was instructed to do so by Dr. Ingram for progression of disease.  Pt states that she has an appt with radiation in [place] on May 25th.  Informed her that ROGER would like to see her in office to examine her hip/shoulder pain before deciding if it's an ortho or progression of disease concern & may need to obtain some imaging prior.  Pt agreeable & schedule office visit with ROGER for 5/21.

## 2021-05-13 NOTE — TELEPHONE ENCOUNTER
Patient returned called I informed her of her appointment time. The patient stated that she sees Dr. Frankel for her hip on June 8th. She said she could do the 9th. The time location and date we given. I informed the patient that per Leigh/Dexter, her appointment will be cancelled for the 21st, and Julissa will just call her with the results. She agreed and verbalized understanding.

## 2021-05-13 NOTE — TELEPHONE ENCOUNTER
Lvm for patient to return call office and return my call to let her know the date of her appointment.

## 2021-05-21 NOTE — TELEPHONE ENCOUNTER
Pt called with c/o bleeding from under her right breast d/t a scab that she scratched off in her sleep.  Pt states she applied neosporin & a bandage & wanted to inform us of the situation.  Reiterated to pt that Dr. Ingram ordered her to f/u with radiation oncology for progression of disease to the areas of concern surrounding her breast.  Pt states she is still planning on making it to her appt on Tues 5/25 with rad onc.  Pt very tangential with random statements. RN redirected pt by confirming date/time of MRI on 6/9.  Pt verbalized understanding & agreeable.  Pt stated no further questions nor concerns at this time.

## 2021-05-25 NOTE — TELEPHONE ENCOUNTER
Patient called to report that she did go to  Radiation in [place] on [facility], and saw Dr. Mcghee.  She just wanted to keep us in the loop.

## 2021-05-28 NOTE — PROGRESS NOTES
Pt called navigation line wanting to speak with navigator Bren.  ONN away from desk, offered to transfer so pt could leave a voice message.  Pt asked to leave a message with me.  Pt stated she now has a new primary care provider and she has seen Dr. Tamia Diego.  She is scheduled to see Dr. Adler on 6/8 for her hip.  She is scheduled to have a gadolinium scan of her brain on 6/18 at [facility] and she will have a PET/CT on 6/22 there as well.  Pt stated she has instruction for PET diet when asked.  Denied questions and other needs at this time. Reassured pt I would cc ONN this note.

## 2021-05-28 NOTE — TELEPHONE ENCOUNTER
Patient called, wanting to get the MRI at [facility] on [address]. She reports she will get her labs done at [facility].  Patient provided the fax # for the MRI orders - FAX# [phone].

## 2021-06-04 NOTE — TELEPHONE ENCOUNTER
Patient called into the clinic to inform us that their appointment with Dr. Diego has been moved to 07/08/2021. Patient also stated that one of their providers wants them to get some brain imaging done and they do not want to go through with it. Pt called to relay this information to the clinic.

## 2021-06-14 NOTE — PROGRESS NOTES
Phoned Aless[facility] Chen and spoke withTerry.  Provided update on pt's unwillingness to proceed with a brain MRI.  Mildred will speak with Dr. Diego and radiation department.  Provided Patricia with pt's number [phone].  Provided contact information for XUAN.

## 2021-06-14 NOTE — PROGRESS NOTES
"Pt phoned stating she was seen by Dr. Diego, Radiation Oncologist at [facility].  She stated Dr. Diego ordered a brain MRI.  Pt states she is very upset and does not feel this is necessary.  She states she had a brain scan ten years ago and the results were normal.  Pt mentioned a PET scan was also discussed with her by one of her providers.  ONN explained these scans are ordered to determine if the cancer has spread to other parts of the body.  Pt states she was not aware of their purpose.    Pt reports she has a hole in her chest large enough to put the tip of her finger in.  She denies pain but states the area \"Itches\".  Explained ONN would contact Navigation team at [facility].  Pt grateful for assistance.  "

## 2021-06-15 NOTE — TELEPHONE ENCOUNTER
Patient called into the clinic in regards to some issues in their chest area. Informed patient RN is in a meeting and will contact them shortly. Pt verbally agreed and acknowledged.
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
## 2021-06-15 NOTE — TELEPHONE ENCOUNTER
Mayte Tristan  1985          ICD-10-CM ICD-9-CM    1. Gross hematuria  R31.0 599.71 AMB POC URINALYSIS DIP STICK AUTO W/O MICRO      CYTOLOGY NON GYN, UROLOGY [facility]      AMB POC PVR, JENNY,POST-VOID RES,US,NON-IMAGING      CT UROGRAM W WO CONT   2. Kidney stone  N20.0 592.0 AMB POC URINALYSIS DIP STICK AUTO W/O MICRO      CYTOLOGY NON GYN, UROLOGY [facility]      AMB POC PVR, JENNY,POST-VOID RES,US,NON-IMAGING      CT UROGRAM W WO CONT       ASSESSMENT:  UA today Negative   PVR today 210 cc     1. Gross Hematuria   Discussed etiologies of hematuria. Reviewed Last Hemoglobin 5/28/21 - 11               Reviewed Last Hematocrit 5/28/21 - 34.5    2. Nephrolithiasis              FH and Hx of kidney stones since 13 y.o. URS in the past per patient. Reviewed last imaging MRI 5/01/21 - Approximate 15 mm left renal pelvic calculus. Reviewed last Calcium - 8.6 5/28/21             Left CVA tenderness on exam.    PLAN:   1. STAT CT Urogram Ordered today. 2. Cytology of urine sent today. Will notify with results. 3. Discussed taking tylenol/ NSAIDs PRN for pain. Use of hot/cold packs may be beneficial as well. RTC after CT Urogram.     Body mass index is 32.59 kg/m². DISCUSSION:     Hematuria:   We discussed the finding of hematuria and need for work up to rule out significant pathology. Work up should include cystoscopy, upper tract imaging, and cytology. Patients who present with gross hematuria have an approximate 23% chance of having a urologic malignancy as the cause. In contradistinction, patients who present with microscopic hematuria have an approximate 5% chance of uncovering a urologic malignancy. Patients with gross hematuria and a negative work up have around an 18% chance of missed malignancy and therefore, I recommend some form of follow up for these individuals.  Approximately 43% of patients who undergo work up for microscopic hematuria Patient rescheduled appointment with ROGER Owen on 2/5/18 at 1:00 pm due to transportation not showing up. Patient rescheduled for 2/7/18 at 1:00 pm and will contact her transportation to update them.    have a non-diagnostic work up. Fortunately, only a very small percentage of these patients have a missed malignancy and therefore, the follow-up can be limited. For Ureteroscopy and laser lithotripsy:   I discussed the risks of cystoscopy, retrograde pyelogram, ureteroscopy with holmium laser lithotripsy, and JJ ureteral stent placement, including bleeding, infection, injury to bladder, ureter, kidney, need for additional procedures, risk of anesthesia (CV-pulmonary event, death),  inability to place the stent, which may require PCN (Percutaneous Nephrostomy Tube(s) placement by radiology. Also discussed JJ ureteral stent symptoms, including urinary frequency, urgency, and dysuria, variable abdominal / flank pain mainly with urination, and variable hematuria. Chief Complaint   Patient presents with    Hematuria    Kidney Stone         History of Present Illness:  Cori Le is a 30 y.o. female who presents today in consultation for Gross hematuria and has been referred by Joe Cruz NP. Hx of kidney stones since 13years old with procedural management with URS in the past. Last stone incidence 1 year ago per patient. MRI 5/01/21 - Approximate 15 mm left renal pelvic calculus. FH of kidney stones. Today, the patient reports ongoing constant, achey left kidney/flank pain that has been present for the past year. Notes two bulging discs in lumbar and cervical spine unrelated to current pain. She also notes gross hematuria. Denies dysuria, recurrent UTI. Asymptomatic for infection today. No f/c/n/v. In regards to urination patient notes frequency q1-2 hours, urgency, with occasional sensation of incomplete bladder emptying, hesitancy, and straining. FOS moderate - strong and denies incontinence. She occasionally has to valsalva void. Notes she has been having some memory issues. Following with Neurology. FH of MS. Current smoker, 3 packs weekly since 14years of age.    No hx of radiation or chemotherapy. No history of renal or bladder cancer. Hx of Hysterectomy - 2020  Hx of gastric bypass     Past Medical History:   Diagnosis Date    Arthropathy of both knees     Essential hypertension     GERD (gastroesophageal reflux disease)     Hypercholesteremia     Hypertriglyceridemia     Kidney stone     Morbid obesity ([address]     Obstructive uropathy     Renal disorder     Scoliosis     Sleep apnea     NAYELI (stress urinary incontinence, female)        Past Surgical History:   Procedure Laterality Date    HX HYSTERECTOMY           Current Outpatient Medications:     ASA/acetaminophen/caffeine/pot (ASA-ACETAMINOPHEN-CAFF-POTASS PO), Take  by mouth as needed. , Disp: , Rfl:     norgestrel-ethinyl estradioL ([place], 28,) 0.3-30 mg-mcg tab, cryselle-28 .3-30 mg-mcg tabs, Disp: , Rfl:     FERROUS SULFATE PO, Take  by mouth daily. , Disp: , Rfl:     GABAPENTIN PO, Take  by mouth., Disp: , Rfl:     ALPRAZolam (XANAX) 1 mg tablet, Take 1 mg by mouth two (2) times a day., Disp: , Rfl:     acetaminophen-codeine (TYLENOL #3) 300-30 mg per tablet, acetaminophen 300 mg-codeine 30 mg tablet, Disp: , Rfl:     acetaminophen (TYLENOL) 500 mg tablet, Take 500 mg by mouth every four (4) hours as needed. , Disp: , Rfl:     amitriptyline (ELAVIL) 100 mg tablet, Take 100 mg by mouth nightly., Disp: , Rfl:     calcium phosphate trib/vit D3 (CALTRATE GUMMY BITES PO), [facility] Bone Support, Disp: , Rfl:     docusate sodium (COLACE) 100 mg capsule, Take 100 mg by mouth daily. , Disp: , Rfl:     escitalopram oxalate (LEXAPRO) 20 mg tablet, Take 20 mg by mouth., Disp: , Rfl:     fluconazole (DIFLUCAN) 150 mg tablet, fluconazole 150 mg tablet, Disp: , Rfl:     hydroCHLOROthiazide (HYDRODIURIL) 50 mg tablet, Take 50 mg by mouth daily. , Disp: , Rfl:     ibuprofen (MOTRIN) 800 mg tablet, Take 800 mg by mouth every six (6) hours as needed. , Disp: , Rfl:     labetaloL (NORMODYNE) 100 mg tablet, labetalol 100 mg tablet, Disp: , Rfl:     ondansetron (ZOFRAN ODT) 8 mg disintegrating tablet, ondansetron 8 mg disintegrating tablet, Disp: , Rfl:     ondansetron hcl (ZOFRAN) 4 mg tablet, Take 4 mg by mouth every eight (8) hours as needed. , Disp: , Rfl:     oxyCODONE-acetaminophen (PERCOCET) 5-325 mg per tablet, oxycodone-acetaminophen 5 mg-325 mg tablet, Disp: , Rfl:     promethazine (PHENERGAN) 25 mg tablet, promethazine 25 mg tablet, Disp: , Rfl:     SUMAtriptan (IMITREX) 25 mg tablet, Take 25 mg by mouth as needed. , Disp: , Rfl:     zolpidem (AMBIEN) 10 mg tablet, zolpidem 10 mg tablet, Disp: , Rfl:     norethindrone (Jencycla) 0.35 mg tab, Jencycla 0.35 mg tablet, Disp: , Rfl:     norethindrone-ethinyl estradiol (Microgestin 1/20, 21,) 1-20 mg-mcg tablet, TK 1 T PO  D, Disp: , Rfl:     MULTIVIT-MIN-FERROUS FUMARATE PO, Take  by mouth daily. , Disp: , Rfl:     thiamine HCl (VITAMIN B-1 PO), Vitamin B1, Disp: , Rfl:     enoxaparin (LOVENOX) 40 mg/0.4 mL, 0.4 mL by SubCUTAneous route every twenty-four (24) hours. Indications: DEEP VEIN THROMBOSIS PREVENTION, Disp: 7 Syringe, Rfl: 0    HYDROcodone-acetaminophen (HYCET) 0.5-21.7 mg/mL oral solution, Take 15 mL by mouth every three (3) hours as needed. Max Daily Amount: 60 mg. Indications: Pain, Disp: 400 mL, Rfl: 0    pantoprazole (PROTONIX) 40 mg tablet, Take 1 Tab by mouth Daily (before breakfast). Indications: HEARTBURN, Disp: 30 Tab, Rfl: 0    cyanocobalamin (VITAMIN B-12) 500 mcg tablet, 500 mcg by SubLINGual route daily. Indications: PREVENTION OF VITAMIN B12 DEFICIENCY, Disp: , Rfl:     calcium citrate-vitamin D3 (CITRACAL WITH VITAMIN D MAXIMUM) tablet, Take 2 Tabs by mouth three (3) times daily. Indications: HYPOCALCEMIA PREVENTION, PREVENTION OF VITAMIN D DEFICIENCY, Disp: , Rfl:     multivitamin with iron (FLINTSTONES) chewable tablet, Take 1 Tab by mouth two (2) times a day. Indications:  MINERAL DEFICIENCY PREVENTION, VITAMIN DEFICIENCY PREVENTION, Disp: , Rfl:     cetirizine (ZYRTEC) 10 mg tablet, Take 10 mg by mouth daily. Indications: SEASONAL ALLERGIC RHINITIS, Disp: , Rfl:     diphenhydrAMINE (BENADRYL ALLERGY) 25 mg tablet, Take 50 mg by mouth nightly as needed for Sleep. Indications: ALLERGIC RHINITIS, Disp: , Rfl:     CALCIUM CITRATE PO, calcium citrate (Patient not taking: Reported on 2021), Disp: , Rfl:     cyanocobalamin, vitamin B-12, (VITAMIN B-12 PO), Vitamin B12 (Patient not taking: Reported on 2021), Disp: , Rfl:     No Known Allergies    Social History     Socioeconomic History    Marital status:      Spouse name: Not on file    Number of children: Not on file    Years of education: Not on file    Highest education level: Not on file   Occupational History    Not on file   Tobacco Use    Smoking status: Former Smoker     Packs/day: 0.50     Years: 10.00     Pack years: 5.00     Types: Cigarettes     Quit date: 2016     Years since quittin.4    Smokeless tobacco: Never Used   Substance and Sexual Activity    Alcohol use: No    Drug use: No    Sexual activity: Yes     Partners: Male     Birth control/protection: Pill   Other Topics Concern    Not on file   Social History Narrative    Not on file     Social Determinants of Health     Financial Resource Strain:     Difficulty of Paying Living Expenses:    Food Insecurity:     Worried About Running Out of Food in the Last Year:     [address] in the Last Year:    Transportation Needs:     Lack of Transportation (Medical):      Lack of Transportation (Non-Medical):    Physical Activity:     Days of Exercise per Week:     Minutes of Exercise per Session:    Stress:     Feeling of Stress :    Social Connections:     Frequency of Communication with Friends and Family:     Frequency of Social Gatherings with Friends and Family:     Attends [employer] Services:     Active Member of Clubs or Organizations:     Attends Club or Organization Meetings:     Marital Status:    Intimate Partner Violence:     Fear of Current or Ex-Partner:     Emotionally Abused:     Physically Abused:     Sexually Abused:        Family History   Problem Relation Age of Onset    Hypertension Father     Cancer Maternal Aunt         breast    Cancer Paternal Grandmother         cervical    Hypertension Paternal Grandfather          Review of Systems  Constitutional: Fever: No  Skin: Rash: No  HEENT: Hearing difficulty: No  Eyes: Blurred vision: No  Cardiovascular: Chest pain: No  Respiratory: Shortness of breath: No  Gastrointestinal: Nausea/vomiting: No  Musculoskeletal: Back pain: Yes  6 L flank  Neurological: Weakness: No  Psychological: Memory loss: No  Comments/additional findings:   All other systems reviewed and are negative      PHYSICAL EXAMINATION:     Visit Vitals  Temp 98 °F (36.7 °C)   Ht 5' 3\" (1.6 m)   Wt 184 lb (83.5 kg)   LMP 04/28/2017 (Exact Date)   BMI 32.59 kg/m²     Constitutional: Well developed, well-nourished female in no acute distress. CV:  No peripheral swelling noted  Respiratory: No respiratory distress or difficulties  Abdomen:  Left CVA tenderness   Skin:  Normal color. No evidence of jaundice. Neuro/Psych:  Patient with appropriate affect. Alert and oriented. Lymphatic:   No enlargement of supraclavicular lymph nodes. LE: No edema.         Labs & Imaging:   Results for orders placed or performed in visit on 06/16/21   AMB POC URINALYSIS DIP STICK AUTO W/O MICRO   Result Value Ref Range    Color (UA POC) Yellow     Clarity (UA POC) Clear     Glucose (UA POC) Negative Negative    Bilirubin (UA POC) Negative Negative    Ketones (UA POC) Negative Negative    Specific gravity (UA POC) 1.010 1.001 - 1.035    Blood (UA POC) Negative Negative    pH (UA POC) 7.5 4.6 - 8.0    Protein (UA POC) Negative Negative    Urobilinogen (UA POC) 0.2 mg/dL 0.2 - 1    Nitrites (UA POC) Negative Negative    Leukocyte esterase (UA POC) Negative Negative   AMB POC PVR, JENNY,POST-VOID RES,US,NON-IMAGING   Result Value Ref Range     cc       A copy of today's office visit with all pertinent imaging results and labs were sent to the referring Natlai Franco NP     MRI Lumbar Spine w/o Contrast 5/01/21   IMPRESSION   No large disc herniation, stenosis or nerve root compression. L3-S1 bilateral mild facet joint hypertrophy. No facet joint synovitis seen. Approximate 15 mm left renal pelvic calculus.      Marcio Vásquez PA-C  [facility]rology of [address] [facility] [place] [address], [address] [facility] [address]  [place], [address]  P: [phone]   F: [phone]

## 2021-06-15 NOTE — PROGRESS NOTES
Patient with recurrent breast cancer, lesions at chest wall, which are worsening. She is needing assistance with wound care and ADLs as she is starting palliative radiation.    HH order placed.

## 2021-09-13 NOTE — TELEPHONE ENCOUNTER
Patient called, asking if she can increase, or double her chemo medication.  (1 mg tab Rimedex)  Her cancer has spread, per Dr. Ingram.   The cancer is in both legs and liver.      Patient called again 9/14/21, asking if Sara can prescribe sildenform guaze for her chest wounds.  (Both Dr. Ingram and Dr. Diego are not answering today)

## 2021-09-14 NOTE — TELEPHONE ENCOUNTER
Saskia from Dr. Ingram's office called to say that they just spoke with patient.  Patient reported that she has been doubling her Arimedex.  They told her to stop.  They also ordered Xeroform guaze for patient to dress her chest wound.

## 2021-09-20 NOTE — PROGRESS NOTES
Pt called just wanting to update staff on radiation records from [facility] being provided to [facility].  Also that she will be having hip surgery on Oct 25th.  Pt will no real needs at this time.  Pt has navigation information if barriers to care arise.

## 2021-10-07 NOTE — LETTER
[facility]   [address] [facility] [address] [facility], [address]  [place] [address]  Phone: [phone]  Fax: [phone]              Dougie Baptiste  1955    Encounter Date: 10/7/2021    CATHLEEN Reveles          PROGRESS NOTE:  Subjective     Dougie Baptiste is a 66 y.o. female who presents with Breast Cancer ([facility] )          HPI   Ms. Baptiste presents for evaluation of anastrozole for continued treatment of neglected stage IIIb (yT4, N1, M0), grade 2, invasive ductal carcinoma of the left breast: ER strongly positive, OH 10-20%, Ki-67 95%, HER-2 negative. Patient is unaccompanied for today's visit.     Patient reportedly noted left breast mass in 2010 but delayed follow-up and missed multiple appointments. She ultimately underwent breast biopsy and established care in our office in 8/2018 and initiated anastrozole. She was referred to radiation oncology but did not follow-up. Lesions slowly improved with continued anastrozole and she ultimately underwent left modified radical mastectomy with axillary dissection on 12/27/2019 per Dr. Ingram.    Patient with recurrent lesions at chest wall at her 2/2021 visit and was referred to Rad Onc per surgeon. After repeated follow up delays she established care with Dr. Diego at [facility]. PET completed 8/20/21 diffuse metastatic disease and bones, liver, chest wall. Patient reports simulation in approx. August (2021) but has not yet initiated therapy, due to transportation and social limitations.    Chest wall lesions are worsening but not painful, requiring dry gauze dressing changed every 1-3 days. She continues with transportation issues and has not initiated radiation therapy as recommended. After extensive discussion she is able to verbalize that she cannot commit and participate in the recommended treatment regimen (5 days per week for approx. 5 weeks). She declines systemic treatment if it involves infusion or injection that requires  "transportation but is amenable to oral chemotherapy. Her biggest concern is worsening right hip pain, secondary to metastatic disease, that is diminishing her overall activity and quality of life. She is experiencing increased upper body discomfort related to compensating for affected gait. Despite the above, she remains in good spirits.        Review of Systems   Constitutional: Positive for weight loss (down 10# since Feb visit). Negative for chills, fever and malaise/fatigue.   Respiratory: Negative for cough, shortness of breath and wheezing.    Cardiovascular: Negative for chest pain, palpitations and leg swelling.        Elevated bp today - attributed to lots of coffee, in a hurry, and discomfort   Gastrointestinal: Positive for vomiting (with increased pain). Negative for blood in stool, constipation (Last BM this am), diarrhea, melena and nausea.   Genitourinary: Negative for dysuria.   Musculoskeletal: Positive for joint pain (R hip pain related to mets - ortho f/v in progress) and myalgias (upper body/shoulder pain due to holding self up on walker; R hand contracture - consistent with baseline (h/o injury) (fingers 3, 4, 5)).        Tylenol helps best; hives and edema with Advil; gi upset with aspirin   Skin:        Worsening chest lesion, dry gauze and paper tape - changed every 1-2 days; no pain    Neurological: Positive for tremors (attributed today with coffee intake) and headaches (with weather changes). Negative for dizziness and tingling.   Psychiatric/Behavioral: Positive for substance abuse (2-3 cigarellos daily; vodka daily (sips t/o day); no meth for \"at least 4 years\"; no other drugs). The patient does not have insomnia.            Allergies   Allergen Reactions   • Meloxicam Hives     Everything ichty bumps everywhere per pt    • Other Misc Runny Nose and Itching     Cats, dust, and pollen    • Versed Vomiting and Nausea           Current Outpatient Medications on File Prior to Visit " "  Medication Sig Dispense Refill   • VENTOLIN  (90 Base) MCG/ACT Aero Soln inhalation aerosol INHALE 2 PUFFS BY MOUTH EVERY 6 HOURS AS NEEDED SHORTNESS OF BREATH 18 g 6   • Calcium Carbonate-Vitamin D 600-400 MG-UNIT Tab TAKE 1 TABLET BY MOUTH ONCE DAILY 90 Tab 0   • ascorbic acid (ASCORBIC ACID) 500 MG Tab Take 2 Tabs by mouth every day. 180 Tab 3   • Prenatal Vit-Fe Fumarate-FA (PRENATAL 1+1 PO) Take  by mouth.     • Ibuprofen (ADVIL PO) Take 500 mg by mouth as needed.     • Calcium 600-400 MG-UNIT Chew Tab Take 1 tablet by mouth every day. 90 Tab 3   • alendronate (FOSAMAX) 70 MG Tab TAKE ONE TABLET BY MOUTH EVERY 7 DAYS ON MONDAY (Patient not taking: Reported on 2/19/2021) 12 Tab 3   • meloxicam (MOBIC) 7.5 MG Tab Take 1 Tab by mouth every day. (Patient not taking: Reported on 7/17/2020) 30 Tab 2     No current facility-administered medications on file prior to visit.           Objective     BP (!) 178/78   Pulse 98   Temp 37 °C (98.6 °F) (Temporal)   Resp 16   Ht 1.64 m (5' 4.57\")   Wt 44.8 kg (98 lb 12.3 oz)   SpO2 98%   BMI 16.66 kg/m²      Physical Exam  Vitals reviewed.   Constitutional:       General: She is not in acute distress.     Appearance: She is well-developed. She is not diaphoretic.      Comments: thin   HENT:      Head: Normocephalic and atraumatic.      Mouth/Throat:      Pharynx: No oropharyngeal exudate.      Comments: New dentures  Eyes:      General: No scleral icterus.        Right eye: No discharge.         Left eye: No discharge.      Conjunctiva/sclera: Conjunctivae normal.      Pupils: Pupils are equal, round, and reactive to light.   Neck:      Thyroid: No thyromegaly.   Cardiovascular:      Rate and Rhythm: Normal rate and regular rhythm.      Heart sounds: Normal heart sounds. No murmur heard.   No friction rub. No gallop.       Comments: Elevated BP, asymptomatic  Pulmonary:      Effort: Pulmonary effort is normal. No respiratory distress.      Breath sounds: Normal " breath sounds. No wheezing.   Chest:       Abdominal:      General: Bowel sounds are normal. There is no distension.      Palpations: Abdomen is soft.      Tenderness: There is no abdominal tenderness.   Musculoskeletal:         General: Tenderness (R hip - using walker) present. Normal range of motion.      Cervical back: Normal range of motion and neck supple.   Lymphadenopathy:      Head:      Right side of head: No submental, submandibular, tonsillar, preauricular, posterior auricular or occipital adenopathy.      Left side of head: No submental, submandibular, tonsillar, preauricular, posterior auricular or occipital adenopathy.      Cervical: No cervical adenopathy.      Right cervical: No superficial or deep cervical adenopathy.     Left cervical: No superficial or deep cervical adenopathy.      Upper Body:      Right upper body: No supraclavicular, axillary, pectoral or epitrochlear adenopathy.      Left upper body: No supraclavicular, axillary, pectoral or epitrochlear adenopathy.   Skin:     General: Skin is warm and dry.      Coloration: Skin is not pale.      Findings: Lesion (~4x6 cm; ~3x4 cm lesions - non tender, scant drainage, covered with dry gauze) present. No erythema or rash.   Neurological:      Mental Status: She is alert and oriented to person, place, and time.      Comments: Some redirection needed   Psychiatric:         Behavior: Behavior normal.      Comments: Consistent with baseline             No visits with results within 1 Day(s) from this visit.   Latest known visit with results is:   Admission on 12/23/2019, Discharged on 12/23/2019   Component Date Value Ref Range Status   • WBC 12/23/2019 8.8  4.8 - 10.8 K/uL Final   • RBC 12/23/2019 4.15* 4.20 - 5.40 M/uL Final   • Hemoglobin 12/23/2019 15.0  12.0 - 16.0 g/dL Final   • Hematocrit 12/23/2019 42.9  37.0 - 47.0 % Final   • MCV 12/23/2019 103.4* 81.4 - 97.8 fL Final   • MCH 12/23/2019 36.1* 27.0 - 33.0 pg Final   • MCHC 12/23/2019  35.0  33.6 - 35.0 g/dL Final   • RDW 12/23/2019 61.3* 35.9 - 50.0 fL Final   • Platelet Count 12/23/2019 227  164 - 446 K/uL Final   • MPV 12/23/2019 9.6  9.0 - 12.9 fL Final   • Sodium 12/23/2019 142  135 - 145 mmol/L Final   • Potassium 12/23/2019 3.8  3.6 - 5.5 mmol/L Final   • Chloride 12/23/2019 103  96 - 112 mmol/L Final   • Co2 12/23/2019 20  20 - 33 mmol/L Final   • Anion Gap 12/23/2019 19.0* 0.0 - 11.9 Final    Results confirmed by repeat testing.   • Glucose 12/23/2019 124* 65 - 99 mg/dL Final   • Bun 12/23/2019 15  8 - 22 mg/dL Final   • Creatinine 12/23/2019 0.53  0.50 - 1.40 mg/dL Final   • Calcium 12/23/2019 9.7  8.5 - 10.5 mg/dL Final   • AST(SGOT) 12/23/2019 53* 12 - 45 U/L Final   • ALT(SGPT) 12/23/2019 22  2 - 50 U/L Final   • Alkaline Phosphatase 12/23/2019 109* 30 - 99 U/L Final   • Total Bilirubin 12/23/2019 0.8  0.1 - 1.5 mg/dL Final   • Albumin 12/23/2019 4.5  3.2 - 4.9 g/dL Final   • Total Protein 12/23/2019 7.6  6.0 - 8.2 g/dL Final   • Globulin 12/23/2019 3.1  1.9 - 3.5 g/dL Final   • A-G Ratio 12/23/2019 1.5  g/dL Final   • GFR If  12/23/2019 >60  >60 mL/min/1.73 m 2 Final   • GFR If Non  12/23/2019 >60  >60 mL/min/1.73 m 2 Final   • Pathology Request 12/23/2019 Sent to Histo   Final           X Ray - Hip  8/31/21  Narrative  An AP pelvis and AP and lateral of her right hip show destructive changes to her right hip including loss of the joint space, collapse of her   femoral head.  Exam Ended: 08/31/21 10:03 AM        PET (@ [facility])  8/20/21  Mahesh Gonzalez MD - 08/20/2021   Formatting of this note might be different from the original.   EXAM: Whole body PET/CT exam (eyes to thighs).     TECHNIQUE: Multiplanar PET imaging from the skull base to the mid thighs was performed after the intravenous administration of 9.9 mCi of F-18 fluorodeoxyglucose (FDG). A noncontrast CT exam of the chest, abdomen, pelvis, and proximal lower extremities was performed  concurrently for attenuation correction purposes. CT exam is of limited diagnostic utility. Blood glucose measured 112.     HISTORY: 66-year-old female with a history of left breast cancer     COMPARISON: None available.     FINDINGS:     CT exam:   Head/neck: No mass or adenopathy noted.     Chest: Numerous hypermetabolic nodules in the superficial soft tissues of the left chest wall. For example a 3 cm left chest nodule demonstrates SUV max 7.1.   Bilateral axillary and left subpectoral lymphadenopathy. For example a 1 cm left axillary lymph node demonstrates SUV max 3.2.     Breathing artifact in the lungs which limits evaluation. No FDG avid pulmonary nodule.     Heart is normal in size and without a pericardial effusion. Bilateral   mastectomy.     Abdomen/pelvis: Numerous hypermetabolic hypoattenuating liver masses. For example at the hepatic dome on the right a 1.5 cm mass  demonstrates SUV max 5.7.     No abnormal fluid collection noted. Calcifications within the pancreas without active inflammatory changes. Moderate atherosclerosis of the abdominal aorta without aneurysm. Solid organs and bowel are otherwise unremarkable.     Bones: Numerous hypermetabolic sclerotic osseous lesions throughout the axial and appendicular skeleton. For example in the right iliac wing there is a sclerotic lesion with SUV max 6.1. Hypermetabolic osseous lesion involving the right posterior 3rd rib with SUV max 3.5. Degenerative changes. Notably advanced degenerative changes of the right hip.     PET exam: Otherwise degenerative and physiologic FDG uptake.     IMPRESSION:   Hypermetabolic left chest wall subcutaneous nodules, bilateral   axillary and left subpectoral lymphadenopathy, liver lesions, and numerous   sclerotic osseous lesions throughout the axial and appendicular skeleton which are suspicious for metastatic disease.         MRI - Hip (@ [facility])  7/8/21  Vishnu North MD - 07/08/2021   Formatting of this  note might be different from the original.   EXAM: Right hip MR with and without contrast.     TECHNIQUE: Multiplanar, multisequence MR images of the right hip were performed in a routine fashion. Images were obtained before and after the administration of 10 mL of MultiHance.     HISTORY: Severe right hip pain. Concern for metastatic disease.     COMPARISON: None available.     FINDINGS:     Osteochondral structures: Severe osteoarthrosis is noted about the right hip with remodeling of the articular surfaces and full-thickness cartilage loss noted throughout the acetabulum and femoral head. Moderate-sized effusion is noted. There is no evidence of osteonecrosis or acute fracture. Abnormal low T1 high T2 signal lesions are identified within the bilateral ilium adjacent to the   sacroiliac joints.     Tendons/musculature: Muscles and tendons about the hip are unremarkable without evidence of muscle strain, tendon tear, or tendinopathy.     Acetabular labrum: Chronic tearing/degeneration of the acetabular labrum is noted.     Bursa: Iliopsoas and greater trochanteric bursa are nondistended.     Sciatic nerve: Unremarkable in signal, course, and morphology.     Other abnormalities: Visualized internal contents of the pelvis are   unremarkable. Contralateral hip is unremarkable.     IMPRESSION:   Severe osteoarthrosis about the right hip with remodeling of the articular   surfaces and full-thickness cartilage loss noted throughout.     Moderate sized right hip effusion.     Abnormal osseous lesions within the bilateral ilium concerning for metastatic disease.     Chronic tearing/degeneration of the acetabular labrum on the right.              Assessment & Plan        1. Breast cancer, stage 3, left (HCC)  letrozole (FEMARA) 2.5 MG Tab    prochlorperazine (COMPAZINE) 10 MG Tab    ondansetron (ZOFRAN) 4 MG Tab tablet    Consent for Non-Surgery w/o Sedation    CBC WITH DIFFERENTIAL    Comp Metabolic Panel    CBC WITH  DIFFERENTIAL   2. Encounter for monitoring anastrozole therapy     3. Encounter for medication review and counseling     4. Chemotherapy-induced nausea  prochlorperazine (COMPAZINE) 10 MG Tab    ondansetron (ZOFRAN) 4 MG Tab tablet   5. Bony metastasis (HCC)     6. Right hip pain     7. Osteopenia of neck of left femur               1.  Hip pain/bony mets: Patient with diffuse metastatic disease noted per PET scan in 8/2021. Right hip pain is limiting activity and quality of life and she desires replacement as per Ortho (Dr. Padron). Suitability for surgery discussed with Dr. Whiting, who also assessed patient today, patient is okay to proceed with hip surgery as per Ortho and is advised that she will likely need palliative radiation to site following surgery.    2.  Osteopenia: Patient prescribed weekly alendronate per PCP. She discontinued in 2/2021 in anticipation of oral surgery. She underwent tooth extraction on 3/11/21 and now has dentures. She is encouraged to resume weekly Fosamax.    3.  Breast cancer: Patient with left breast lesion in 2010 but did not establish care until 8/2018. She was initiated on anastrozole, did not received XRT, and was ultimately able to undergo left mastectomy in 12/2019. Progression of disease noted at chest wall 2/2021, breast surgeon referred to radiation oncology. Patient is not able to complete recommended treatment due to lack of transportation and social limitations, she cannot leave her dog unattended for the time need to complete treatment. These limitation also preclude her participation in systemic therapy at an Infusion Center but she is now amenable to systemic therapy if oral.    Dr. Whiting assessed patient today as well and discussed treatment options with her. He advises discontinuing daily anastrozole and starting daily letrozole along with Ibrance 100 mg daily (3 weeks on; 1 week off). Patient is amenable to treatment plan and would like to proceed. She will start  letrozole upon receipt and Ibrance will start after hip surgery.        Patient was educated on chemotherapy including but not limited to: cycling of chemotherapy, length of treatment, alopecia, myelosuppression, infection prevention, fatigue, GI distress, use of specific nausea medications, avoidance of alcoholic beverages and supplement medications, use of sunscreen, chemotherapy precautions at home, and invasive procedures. Patient was provided with drug specific handouts, [facility] chemotherapy and you book, [facility] eating hints book, [facility] side effects sheet, Ibrance education kit per , [employer] gift bag.    The following appointments, labs, and medications have been provided to the patient:  Line: n/a  Labs: weekly initially then monthly, or as per RN given Ibrance tolerance  OnPro: n/a  Lab Appointments: to be completed at [facility]  Follow up appts: in person or telemed - as per patient preference  Chemotherapy class: abbreviated version provided today  Nausea medication: zofran/compazine  Pain medication: n/a  Nurse Maicol: established with [facility] ([facility])  Dietician:  Referred per policy  Speech (SLT): n/a  SW: n/a  Symptom Mgmt: n/a       Spent 60 minutes of continuous, non-interrupted, face-to-face patient contact in which greater than 50% of the visit was spent counseling and coordinating of care.        The patient verbalized agreement and understanding of current plan. All questions and concerns were addressed at time of visit.    Please note that this dictation was created using voice recognition software. I have made every reasonable attempt to correct obvious errors, but I expect that there are errors of grammar and possibly content that I did not discover before finalizing the note.            Cesar Padron M.D.  [address]  [place] [address]  Via In Basket     Tamia Diego M.D.  [address]  [address]  [place] [address]  Via Fax: [phone]     Gracia HANNAH  NETO Ingram  [address] [place] [address]  Via Fax: [phone]

## 2021-10-07 NOTE — Clinical Note
Letty,  I put in CBC and CMP for her to complete in the next week or so. There is also a weekly CBC for when she starts treatment. These will be done at [facility] in [place] so will need faxed. OK to wait to fax the standing order so as not to confuse them.    Please let Dougie know that we should start treatment AFTER her hip surgery - that should be in a couple weeks.      Dr. Henok Abraham wants Ibrance 100 (daily (3 weeks on/1 week off).   She needs surgery first so not an urgent start but prob needs rolling out

## 2021-10-07 NOTE — PROGRESS NOTES
Subjective     Dougie Baptiste is a 66 y.o. female who presents with Breast Cancer (FV )          HPI   Ms. Baptiste presents for evaluation of anastrozole for continued treatment of neglected stage IIIb (yT4, N1, M0), grade 2, invasive ductal carcinoma of the left breast: ER strongly positive, MN 10-20%, Ki-67 95%, HER-2 negative. Patient is unaccompanied for today's visit.     Patient reportedly noted left breast mass in 2010 but delayed follow-up and missed multiple appointments. She ultimately underwent breast biopsy and established care in our office in 8/2018 and initiated anastrozole. She was referred to radiation oncology but did not follow-up. Lesions slowly improved with continued anastrozole and she ultimately underwent left modified radical mastectomy with axillary dissection on 12/27/2019 per Dr. Ingram.    Patient with recurrent lesions at chest wall at her 2/2021 visit and was referred to Rad Onc per surgeon. After repeated follow up delays she established care with Dr. Diego at [facility]. PET completed 8/20/21 diffuse metastatic disease in bones, liver, chest wall. Patient reports XRT simulation in approx. August (2021) but has not yet initiated therapy, due to transportation and social limitations.    Chest wall lesions are worsening but not painful, requiring dry gauze dressing changed every 1-3 days. She continues with transportation issues and has not initiated radiation therapy as recommended. After extensive discussion she is able to verbalize that she cannot commit and participate in the recommended treatment regimen (5 days per week for approx. 5 weeks). She declines systemic treatment if it involves infusion or injection that requires transportation but is amenable to oral chemotherapy. Her biggest concern is worsening right hip pain, secondary to metastatic disease, that is diminishing her overall activity and quality of life. She is experiencing increased upper body  "discomfort related to compensating for affected gait. Despite the above, she remains in good spirits.        Review of Systems   Constitutional: Positive for weight loss (down 10# since Feb visit). Negative for chills, fever and malaise/fatigue.   Respiratory: Negative for cough, shortness of breath and wheezing.    Cardiovascular: Negative for chest pain, palpitations and leg swelling.        Elevated bp today - attributed to lots of coffee, in a hurry, and discomfort   Gastrointestinal: Positive for vomiting (with increased pain). Negative for blood in stool, constipation (Last BM this am), diarrhea, melena and nausea.   Genitourinary: Negative for dysuria.   Musculoskeletal: Positive for joint pain (R hip pain related to mets - ortho f/v in progress) and myalgias (upper body/shoulder pain due to holding self up on walker; R hand contracture - consistent with baseline (h/o injury) (fingers 3, 4, 5)).        Tylenol helps best; hives and edema with Advil; gi upset with aspirin   Skin:        Worsening chest lesion, dry gauze and paper tape - changed every 1-2 days; no pain    Neurological: Positive for tremors (attributed today with coffee intake) and headaches (with weather changes). Negative for dizziness and tingling.   Psychiatric/Behavioral: Positive for substance abuse (2-3 cigarellos daily; vodka daily (sips t/o day); no meth for \"at least 4 years\"; no other drugs). The patient does not have insomnia.            Allergies   Allergen Reactions   • Meloxicam Hives     Everything ichty bumps everywhere per pt    • Other Misc Runny Nose and Itching     Cats, dust, and pollen    • Versed Vomiting and Nausea           Current Outpatient Medications on File Prior to Visit   Medication Sig Dispense Refill   • VENTOLIN  (90 Base) MCG/ACT Aero Soln inhalation aerosol INHALE 2 PUFFS BY MOUTH EVERY 6 HOURS AS NEEDED SHORTNESS OF BREATH 18 g 6   • Calcium Carbonate-Vitamin D 600-400 MG-UNIT Tab TAKE 1 TABLET BY " "MOUTH ONCE DAILY 90 Tab 0   • ascorbic acid (ASCORBIC ACID) 500 MG Tab Take 2 Tabs by mouth every day. 180 Tab 3   • Prenatal Vit-Fe Fumarate-FA (PRENATAL 1+1 PO) Take  by mouth.     • Ibuprofen (ADVIL PO) Take 500 mg by mouth as needed.     • Calcium 600-400 MG-UNIT Chew Tab Take 1 tablet by mouth every day. 90 Tab 3   • alendronate (FOSAMAX) 70 MG Tab TAKE ONE TABLET BY MOUTH EVERY 7 DAYS ON MONDAY (Patient not taking: Reported on 2/19/2021) 12 Tab 3   • meloxicam (MOBIC) 7.5 MG Tab Take 1 Tab by mouth every day. (Patient not taking: Reported on 7/17/2020) 30 Tab 2     No current facility-administered medications on file prior to visit.           Objective     BP (!) 178/78   Pulse 98   Temp 37 °C (98.6 °F) (Temporal)   Resp 16   Ht 1.64 m (5' 4.57\")   Wt 44.8 kg (98 lb 12.3 oz)   SpO2 98%   BMI 16.66 kg/m²      Physical Exam  Vitals reviewed.   Constitutional:       General: She is not in acute distress.     Appearance: She is well-developed. She is not diaphoretic.      Comments: thin   HENT:      Head: Normocephalic and atraumatic.      Mouth/Throat:      Pharynx: No oropharyngeal exudate.      Comments: New dentures  Eyes:      General: No scleral icterus.        Right eye: No discharge.         Left eye: No discharge.      Conjunctiva/sclera: Conjunctivae normal.      Pupils: Pupils are equal, round, and reactive to light.   Neck:      Thyroid: No thyromegaly.   Cardiovascular:      Rate and Rhythm: Normal rate and regular rhythm.      Heart sounds: Normal heart sounds. No murmur heard.   No friction rub. No gallop.       Comments: Elevated BP, asymptomatic  Pulmonary:      Effort: Pulmonary effort is normal. No respiratory distress.      Breath sounds: Normal breath sounds. No wheezing.   Chest:       Abdominal:      General: Bowel sounds are normal. There is no distension.      Palpations: Abdomen is soft.      Tenderness: There is no abdominal tenderness.   Musculoskeletal:         General: " Tenderness (R hip - using walker) present. Normal range of motion.      Cervical back: Normal range of motion and neck supple.   Lymphadenopathy:      Head:      Right side of head: No submental, submandibular, tonsillar, preauricular, posterior auricular or occipital adenopathy.      Left side of head: No submental, submandibular, tonsillar, preauricular, posterior auricular or occipital adenopathy.      Cervical: No cervical adenopathy.      Right cervical: No superficial or deep cervical adenopathy.     Left cervical: No superficial or deep cervical adenopathy.      Upper Body:      Right upper body: No supraclavicular, axillary, pectoral or epitrochlear adenopathy.      Left upper body: No supraclavicular, axillary, pectoral or epitrochlear adenopathy.   Skin:     General: Skin is warm and dry.      Coloration: Skin is not pale.      Findings: Lesion (~4x6 cm; ~3x4 cm lesions - non tender, scant drainage, covered with dry gauze) present. No erythema or rash.   Neurological:      Mental Status: She is alert and oriented to person, place, and time.      Comments: Some redirection needed   Psychiatric:         Behavior: Behavior normal.      Comments: Consistent with baseline             No visits with results within 1 Day(s) from this visit.   Latest known visit with results is:   Admission on 12/23/2019, Discharged on 12/23/2019   Component Date Value Ref Range Status   • WBC 12/23/2019 8.8  4.8 - 10.8 K/uL Final   • RBC 12/23/2019 4.15* 4.20 - 5.40 M/uL Final   • Hemoglobin 12/23/2019 15.0  12.0 - 16.0 g/dL Final   • Hematocrit 12/23/2019 42.9  37.0 - 47.0 % Final   • MCV 12/23/2019 103.4* 81.4 - 97.8 fL Final   • MCH 12/23/2019 36.1* 27.0 - 33.0 pg Final   • MCHC 12/23/2019 35.0  33.6 - 35.0 g/dL Final   • RDW 12/23/2019 61.3* 35.9 - 50.0 fL Final   • Platelet Count 12/23/2019 227  164 - 446 K/uL Final   • MPV 12/23/2019 9.6  9.0 - 12.9 fL Final   • Sodium 12/23/2019 142  135 - 145 mmol/L Final   • Potassium  12/23/2019 3.8  3.6 - 5.5 mmol/L Final   • Chloride 12/23/2019 103  96 - 112 mmol/L Final   • Co2 12/23/2019 20  20 - 33 mmol/L Final   • Anion Gap 12/23/2019 19.0* 0.0 - 11.9 Final    Results confirmed by repeat testing.   • Glucose 12/23/2019 124* 65 - 99 mg/dL Final   • Bun 12/23/2019 15  8 - 22 mg/dL Final   • Creatinine 12/23/2019 0.53  0.50 - 1.40 mg/dL Final   • Calcium 12/23/2019 9.7  8.5 - 10.5 mg/dL Final   • AST(SGOT) 12/23/2019 53* 12 - 45 U/L Final   • ALT(SGPT) 12/23/2019 22  2 - 50 U/L Final   • Alkaline Phosphatase 12/23/2019 109* 30 - 99 U/L Final   • Total Bilirubin 12/23/2019 0.8  0.1 - 1.5 mg/dL Final   • Albumin 12/23/2019 4.5  3.2 - 4.9 g/dL Final   • Total Protein 12/23/2019 7.6  6.0 - 8.2 g/dL Final   • Globulin 12/23/2019 3.1  1.9 - 3.5 g/dL Final   • A-G Ratio 12/23/2019 1.5  g/dL Final   • GFR If  12/23/2019 >60  >60 mL/min/1.73 m 2 Final   • GFR If Non  12/23/2019 >60  >60 mL/min/1.73 m 2 Final   • Pathology Request 12/23/2019 Sent to Histo   Final           X Ray - Hip  8/31/21  Narrative  An AP pelvis and AP and lateral of her right hip show destructive changes to her right hip including loss of the joint space, collapse of her   femoral head.  Exam Ended: 08/31/21 10:03 AM        PET (@ [facility])  8/20/21  Mahesh Gonzalez MD - 08/20/2021   Formatting of this note might be different from the original.   EXAM: Whole body PET/CT exam (eyes to thighs).     TECHNIQUE: Multiplanar PET imaging from the skull base to the mid thighs was performed after the intravenous administration of 9.9 mCi of F-18 fluorodeoxyglucose (FDG). A noncontrast CT exam of the chest, abdomen, pelvis, and proximal lower extremities was performed concurrently for attenuation correction purposes. CT exam is of limited diagnostic utility. Blood glucose measured 112.     HISTORY: 66-year-old female with a history of left breast cancer     COMPARISON: None available.     FINDINGS:     CT  exam:   Head/neck: No mass or adenopathy noted.     Chest: Numerous hypermetabolic nodules in the superficial soft tissues of the left chest wall. For example a 3 cm left chest nodule demonstrates SUV max 7.1.   Bilateral axillary and left subpectoral lymphadenopathy. For example a 1 cm left axillary lymph node demonstrates SUV max 3.2.     Breathing artifact in the lungs which limits evaluation. No FDG avid pulmonary nodule.     Heart is normal in size and without a pericardial effusion. Bilateral   mastectomy.     Abdomen/pelvis: Numerous hypermetabolic hypoattenuating liver masses. For example at the hepatic dome on the right a 1.5 cm mass  demonstrates SUV max 5.7.     No abnormal fluid collection noted. Calcifications within the pancreas without active inflammatory changes. Moderate atherosclerosis of the abdominal aorta without aneurysm. Solid organs and bowel are otherwise unremarkable.     Bones: Numerous hypermetabolic sclerotic osseous lesions throughout the axial and appendicular skeleton. For example in the right iliac wing there is a sclerotic lesion with SUV max 6.1. Hypermetabolic osseous lesion involving the right posterior 3rd rib with SUV max 3.5. Degenerative changes. Notably advanced degenerative changes of the right hip.     PET exam: Otherwise degenerative and physiologic FDG uptake.     IMPRESSION:   Hypermetabolic left chest wall subcutaneous nodules, bilateral   axillary and left subpectoral lymphadenopathy, liver lesions, and numerous   sclerotic osseous lesions throughout the axial and appendicular skeleton which are suspicious for metastatic disease.         MRI - Hip (@ [facility])  7/8/21  Vishnu North MD - 07/08/2021   Formatting of this note might be different from the original.   EXAM: Right hip MR with and without contrast.     TECHNIQUE: Multiplanar, multisequence MR images of the right hip were performed in a routine fashion. Images were obtained before and after the  administration of 10 mL of MultiHance.     HISTORY: Severe right hip pain. Concern for metastatic disease.     COMPARISON: None available.     FINDINGS:     Osteochondral structures: Severe osteoarthrosis is noted about the right hip with remodeling of the articular surfaces and full-thickness cartilage loss noted throughout the acetabulum and femoral head. Moderate-sized effusion is noted. There is no evidence of osteonecrosis or acute fracture. Abnormal low T1 high T2 signal lesions are identified within the bilateral ilium adjacent to the   sacroiliac joints.     Tendons/musculature: Muscles and tendons about the hip are unremarkable without evidence of muscle strain, tendon tear, or tendinopathy.     Acetabular labrum: Chronic tearing/degeneration of the acetabular labrum is noted.     Bursa: Iliopsoas and greater trochanteric bursa are nondistended.     Sciatic nerve: Unremarkable in signal, course, and morphology.     Other abnormalities: Visualized internal contents of the pelvis are   unremarkable. Contralateral hip is unremarkable.     IMPRESSION:   Severe osteoarthrosis about the right hip with remodeling of the articular   surfaces and full-thickness cartilage loss noted throughout.     Moderate sized right hip effusion.     Abnormal osseous lesions within the bilateral ilium concerning for metastatic disease.     Chronic tearing/degeneration of the acetabular labrum on the right.              Assessment & Plan        1. Breast cancer, stage 3, left (HCC)  letrozole (FEMARA) 2.5 MG Tab    prochlorperazine (COMPAZINE) 10 MG Tab    ondansetron (ZOFRAN) 4 MG Tab tablet    Consent for Non-Surgery w/o Sedation    CBC WITH DIFFERENTIAL    Comp Metabolic Panel    CBC WITH DIFFERENTIAL   2. Encounter for monitoring anastrozole therapy     3. Encounter for medication review and counseling     4. Chemotherapy-induced nausea  prochlorperazine (COMPAZINE) 10 MG Tab    ondansetron (ZOFRAN) 4 MG Tab tablet   5. Bony  metastasis (HCC)     6. Right hip pain     7. Osteopenia of neck of left femur               1.  Hip pain/bony mets: Patient with diffuse metastatic disease noted per PET scan in 8/2021. Right hip pain is limiting activity and quality of life and she desires replacement as per Ortho (Dr. Padron). Suitability for surgery discussed with Dr. Whiting, who also assessed patient today, patient is okay to proceed with hip surgery as per Ortho and is advised that she will likely need palliative radiation to site following surgery.    2.  Osteopenia: Patient prescribed weekly alendronate per PCP. She discontinued in 2/2021 in anticipation of oral surgery. She underwent tooth extraction on 3/11/21 and now has dentures. She is encouraged to resume weekly Fosamax.    3.  Breast cancer: Patient with left breast lesion in 2010 but did not establish care until 8/2018. She was initiated on anastrozole, did not receive XRT, and was ultimately able to undergo left mastectomy in 12/2019. Progression of disease noted at chest wall 2/2021, breast surgeon referred to radiation oncology. Patient is not able to complete recommended treatment due to lack of transportation and social limitations, she cannot leave her dog unattended for the time need to complete treatment. These limitationsalso preclude her participation in systemic therapy at an Infusion Center but she is now amenable to systemic therapy if oral.    Dr. Whiting assessed patient today as well and discussed treatment options with her. He advises discontinuing daily anastrozole and starting daily letrozole along with Ibrance 100 mg daily (3 weeks on; 1 week off). Patient is amenable to treatment plan and would like to proceed. She will start letrozole upon receipt and Ibrance will start after hip surgery.        Patient was educated on chemotherapy including but not limited to: cycling of chemotherapy, length of treatment, alopecia, myelosuppression, infection prevention, fatigue,  GI distress, use of specific nausea medications, avoidance of alcoholic beverages and supplement medications, use of sunscreen, chemotherapy precautions at home, and invasive procedures. Patient was provided with drug specific handouts, [facility] chemotherapy and you book, [facility] eating hints book, [facility] side effects sheet, Ibrance education kit per , [employer] gift bag.    The following appointments, labs, and medications have been provided to the patient:  Line: n/a  Labs: weekly initially then monthly, or as per RN given Ibrance tolerance  OnPro: n/a  Lab Appointments: to be completed at [facility]  Follow up appts: in person or telemed - as per patient preference  Chemotherapy class: abbreviated version provided today  Nausea medication: zofran/compazine  Pain medication: n/a  Nurse Maicol: established with [facility] ([facility])  Dietician:  Referred per policy  Speech (SLT): n/a  SW: n/a  Symptom Mgmt: n/a       Spent 60 minutes of continuous, non-interrupted, face-to-face patient contact in which greater than 50% of the visit was spent counseling and coordinating of care.        The patient verbalized agreement and understanding of current plan. All questions and concerns were addressed at time of visit.    Please note that this dictation was created using voice recognition software. I have made every reasonable attempt to correct obvious errors, but I expect that there are errors of grammar and possibly content that I did not discover before finalizing the note.

## 2021-10-08 NOTE — TELEPHONE ENCOUNTER
Pt called again and stated was asking about the labs and drawing a base line, I explained to her the labs that we draw will tell us where those levels are at. She stated that she was just trying to be persistent and making sure that we get everything for her.

## 2021-10-08 NOTE — TELEPHONE ENCOUNTER
Pt called into the clinic to update ROGER Sheehan. Pt states they cancelled their FV with Dr. Ingram on 11/27/2021. Pt also wanted to see if we could fax labs to [facility]. Fax # [phone]. Informed patient I would relay information to RUDI Saenz as well. Pt acknowledged.

## 2021-10-12 PROBLEM — M85.852 OSTEOPENIA OF NECK OF LEFT FEMUR: Status: ACTIVE | Noted: 2019-02-20

## 2021-10-12 NOTE — TELEPHONE ENCOUNTER
Pt aware that CBC and CMP are in place, she stated she will go get those in the next week or so, send over to [place], she stated she will wait until after hip Sugery to do weekly CBC and wait for Leigh's call. appreciative of the call back.

## 2021-10-18 NOTE — TELEPHONE ENCOUNTER
Called [id number] to notify specialty pharm of prior auth approval from insurance.  Pharm rep stated that they rec'vd the same PA approval & next step is for pharm to contact pt to set up delivery.  Pharm rep stated that co-pay for pt is $3.

## 2021-10-20 NOTE — PREPROCEDURE INSTRUCTIONS
Pre admit appointment via telephone. History and medications reviewed. Pre-op instructions given, hand outs reviewed and questions answered. Patient does not have an email to send video/handouts to. [facility] instructions given.    Anesthesia fasting guidelines reviewed. Patient instructed to continue regularly prescribed medications through the day before surgery. Per anesthesia protocol, patient instructed to take these medications with a sip of water the day of surgery: tylenol, anastrozole, colase, ventolin.    Not vaccinated for Covid-19, test needed, instructed to wear a mask in per CDC guidelines and notify MD office with any signs/symptoms of illness prior to procedure.    Vonnie, surgery scheduler for Dr. Padron asked to send order for Covid test to [facility]. Patient reports she will be doing testing at [facility], tomorrow (10/21/2021). She has an order for and EKG and MRSA swab.     Patient reported she could not  CHG soap from [facility] and she cannot afford to purchase soap. Notified Vonnie at Dr. Padron's office and she reached out to patient regarding shower instructions prior to procedure. Patient reported she has a surgical soap at home that Vonnie said she could use.

## 2021-10-21 NOTE — TELEPHONE ENCOUNTER
Pt called and stated that she had just got done talking to speciality pharmacy and her Ibrance will be shipped to her on 11/09/2021. She just wanted to let us know.

## 2021-10-26 NOTE — TELEPHONE ENCOUNTER
Rec'vd call from [id number] stating that they contacted pt regarding delivery of Ibrance & pt told pharmacy not to send the Ibrance just yet as she will not start the medication until after hip surgery.  [id number] contacted this RN to confirm if this info was correct.    RN called pt to confirm surgery date.  Pt stated that hip surgery was scheduled for 10/25 & then canceled.  Pt states that the surgery has not been rescheduled yet but should be sometime soon.  Pt states she will inform our office once it has been scheduled so that a plan can be made for start date of Ibrance & delivery of med can be scheduled on time.  Pt reports she has some pre-admit testing to complete prior to surgery (i.e. covid test).    Called [id number] [phone] & informed them that they will be notified once pt is ready to start the Ibrance.  [id number] pharmacist stated that they would f/u with pt and/or our office in a few weeks if they haven't rec'vd any updates.

## 2021-11-02 NOTE — TELEPHONE ENCOUNTER
Pt returned call to RN & stated she has not heard from Dr. Majano's office yet regarding when her hip surgery is scheduled for.  Pt states she has about 20 tabs left of anastrozole.  Pt made aware that a refill for anastrozole will be sent to her local pharmacy so that she had enough supply & pt verbalized understanding that anastrozole will be d/c'd once pt begins Ibrance.      Per request of pt, this RN called Dr. Majano's surgery scheduler, Vonnie, at [facility] ([facility] to inquire about surgery date.  Vonnie did not answer, but RN left detailed message with contact info & request for a call back.

## 2021-11-02 NOTE — TELEPHONE ENCOUNTER
F/U call made to pt to inquire when her hip surgery is scheduled, but pt did not answer.  Left message with direct office contact # of [phone] (since [phone] has been unsuccessful) for pt to return call to RN with surgery update so start date of Ibrance can be planned with pt.

## 2021-11-02 NOTE — TELEPHONE ENCOUNTER
Patient stated that she was just returning Leigh's phone call to update her on her hip surgery which is scheduled for December 20th, 2021 with Dr. Padron at [facility].     Patient didn't have any additional questions or concerns and informed her that I will relay this message to Leigh.

## 2021-11-03 NOTE — TELEPHONE ENCOUNTER
Patient called to report that her surgery date with Dr. Padron is now December 20.  Patient states she's already called re: Julia.

## 2021-11-03 NOTE — TELEPHONE ENCOUNTER
Rec'vd call back from Vonnie at Dr. Padron's office stating that pt's hip surgery has been rescheduled for 12/20.  Vonnie stated that pt has completed all necessary lab work, but will need to complete a negative Covid test within a range of 4 - 7 days prior to surgery date.  Vonnie stated that the pt is struggling with getting the Covid test completed during the specified time which is why the previous surgery date of 10/25 was canceled.

## 2021-11-19 NOTE — TELEPHONE ENCOUNTER
Patient called requesting a break down of what each medication was for so that she had a better understanding of what she was taking. She wanted everything written down on one piece of paper or typed out to make it easier. She asked that we please send this in the mail as soon as we can.    I let her know that I will send this message to Letty and that she will get around to this when she has a free moment to do so.     Patient agreed and verbalized understanding.

## 2021-11-22 NOTE — TELEPHONE ENCOUNTER
Rec'vd call from pt with update regarding surgery.  Pt states she continues to plan to have hip surgery on 12/20 & will complete all of the necessary pre-op testing on 12/13.  Pt just p/u her last refill of anastrozole (Q-30) which will run out a few days after surgery.  Explained to pt that she will be discontinuing anastrozole once she transitions over to letrozole with Ibrance.  Informed pt that our office will be in touch with her regarding start date of new meds.  Pt stated that her f/u post-op appt with Dr. Padron is scheduled for 12/28.  Pt stated no further questions & agreeable with plan at this time.

## 2021-11-23 NOTE — TELEPHONE ENCOUNTER
Attempted to call pt to see what she would like on the form, no answer let know I am working on it and will send it out by the end of this week.

## 2021-11-24 NOTE — TELEPHONE ENCOUNTER
Returned call to pt to further inquire about bleeding from breast.  Pt did not answer, left message with office holiday hours & to call with any urgent concerns.

## 2021-11-24 NOTE — TELEPHONE ENCOUNTER
Patient called with an update:  Her breast is bleeding lots.  She squeezes it real good, and then applies neosporin.  Just an update.

## 2021-12-02 NOTE — PREPROCEDURE INSTRUCTIONS
CHG shower kit with instructions mailed to patient.  To have labs drawn at lab [facility] 12/13.  Will need CBC, BMP, APTT MRSA, Covid.  Ekg done 12/13 and on chart

## 2021-12-14 NOTE — TELEPHONE ENCOUNTER
Patient states she is having surgery next week, but is going to be short on her Arimedex (anastrozole).

## 2021-12-15 NOTE — TELEPHONE ENCOUNTER
Received request via: Patient    Was the patient seen in the last year in this department? Yes    Does the patient have an active prescription (recently filled or refills available) for medication(s) requested? No       Patient called ans stated she is having surgery next week and will be out of her Anastrozole.    Initially filled 11/2/21    Qty: 30  No refills

## 2021-12-16 NOTE — TELEPHONE ENCOUNTER
"Patient reporting that her \"platelets are through the roof\" - and she was instructed to see Sara Sheehan or Dr. Whiting.  Her hip surgery scheduled for Monday has been cancelled due to her platelets.   Appointment scheduled for 1/6/22 with Sara.  "

## 2021-12-16 NOTE — TELEPHONE ENCOUNTER
Patient called inquiring as to if she is supposed to be starting  her Ibrance medication ( which was to be started 2 weeks after surgery) as well as her Anastrozole. The surgery was cancelled and she seems a bit confused. I advised her I would communicate this to Sara and get back to her.

## 2022-01-01 ENCOUNTER — TELEPHONE (OUTPATIENT)
Dept: HEMATOLOGY ONCOLOGY | Facility: MEDICAL CENTER | Age: 67
End: 2022-01-01
Payer: MEDICARE

## 2022-01-01 ENCOUNTER — HOSPITAL ENCOUNTER (OUTPATIENT)
Dept: RADIOLOGY | Facility: MEDICAL CENTER | Age: 67
End: 2022-05-02

## 2022-01-01 ENCOUNTER — HOSPITAL ENCOUNTER (EMERGENCY)
Facility: MEDICAL CENTER | Age: 67
End: 2022-05-02
Payer: MEDICARE

## 2022-01-01 ENCOUNTER — TELEPHONE (OUTPATIENT)
Dept: HEMATOLOGY ONCOLOGY | Facility: MEDICAL CENTER | Age: 67
End: 2022-01-01

## 2022-01-01 ENCOUNTER — OFFICE VISIT (OUTPATIENT)
Dept: HEMATOLOGY ONCOLOGY | Facility: MEDICAL CENTER | Age: 67
End: 2022-01-01
Payer: MEDICARE

## 2022-01-01 ENCOUNTER — HOSPITAL ENCOUNTER (INPATIENT)
Facility: MEDICAL CENTER | Age: 67
LOS: 2 days | DRG: 951 | End: 2022-05-04
Attending: INTERNAL MEDICINE | Admitting: STUDENT IN AN ORGANIZED HEALTH CARE EDUCATION/TRAINING PROGRAM
Payer: MEDICARE

## 2022-01-01 ENCOUNTER — PATIENT OUTREACH (OUTPATIENT)
Dept: OTHER | Facility: MEDICAL CENTER | Age: 67
End: 2022-01-01

## 2022-01-01 ENCOUNTER — APPOINTMENT (OUTPATIENT)
Dept: HEMATOLOGY ONCOLOGY | Facility: MEDICAL CENTER | Age: 67
End: 2022-01-01
Payer: MEDICARE

## 2022-01-01 ENCOUNTER — HOME CARE VISIT (OUTPATIENT)
Dept: HOSPICE | Facility: HOSPICE | Age: 67
End: 2022-01-01
Payer: MEDICARE

## 2022-01-01 ENCOUNTER — HOSPICE ADMISSION (OUTPATIENT)
Dept: HOSPICE | Facility: HOSPICE | Age: 67
End: 2022-01-01
Payer: MEDICARE

## 2022-01-01 VITALS
HEIGHT: 65 IN | DIASTOLIC BLOOD PRESSURE: 72 MMHG | HEART RATE: 94 BPM | OXYGEN SATURATION: 96 % | BODY MASS INDEX: 15.87 KG/M2 | WEIGHT: 95.24 LBS | RESPIRATION RATE: 16 BRPM | TEMPERATURE: 98.4 F | SYSTOLIC BLOOD PRESSURE: 142 MMHG

## 2022-01-01 VITALS
WEIGHT: 95.24 LBS | SYSTOLIC BLOOD PRESSURE: 75 MMHG | OXYGEN SATURATION: 80 % | BODY MASS INDEX: 16.06 KG/M2 | HEART RATE: 87 BPM | TEMPERATURE: 101.1 F | RESPIRATION RATE: 26 BRPM | DIASTOLIC BLOOD PRESSURE: 38 MMHG

## 2022-01-01 DIAGNOSIS — C50.912 BREAST CANCER, STAGE 3, LEFT (HCC): ICD-10-CM

## 2022-01-01 DIAGNOSIS — D75.839 THROMBOCYTOSIS: ICD-10-CM

## 2022-01-01 DIAGNOSIS — C50.912 BREAST CANCER, STAGE 3, LEFT (HCC): Primary | ICD-10-CM

## 2022-01-01 DIAGNOSIS — Z79.899 ENCOUNTER FOR LONG-TERM CURRENT USE OF HIGH RISK MEDICATION: ICD-10-CM

## 2022-01-01 DIAGNOSIS — Z51.81 ENCOUNTER FOR MONITORING ANASTROZOLE THERAPY: ICD-10-CM

## 2022-01-01 DIAGNOSIS — Z79.811 ENCOUNTER FOR MONITORING ANASTROZOLE THERAPY: ICD-10-CM

## 2022-01-01 DIAGNOSIS — M25.559 HIP PAIN: ICD-10-CM

## 2022-01-01 DIAGNOSIS — C79.51 BONY METASTASIS: ICD-10-CM

## 2022-01-01 DIAGNOSIS — C50.919 METASTATIC BREAST CANCER: ICD-10-CM

## 2022-01-01 DIAGNOSIS — E87.3: ICD-10-CM

## 2022-01-01 DIAGNOSIS — C79.51 BONE METASTASES: ICD-10-CM

## 2022-01-01 DIAGNOSIS — M85.852 OSTEOPENIA OF NECK OF LEFT FEMUR: ICD-10-CM

## 2022-01-01 LAB
ALBUMIN SERPL BCP-MCNC: 3 G/DL (ref 3.2–4.9)
ALBUMIN/GLOB SERPL: 1.3 G/DL
ALP SERPL-CCNC: 353 U/L (ref 30–99)
ALT SERPL-CCNC: 43 U/L (ref 2–50)
AMMONIA PLAS-SCNC: 11 UMOL/L (ref 11–45)
ANION GAP SERPL CALC-SCNC: 29 MMOL/L (ref 7–16)
ANISOCYTOSIS BLD QL SMEAR: ABNORMAL
AST SERPL-CCNC: 157 U/L (ref 12–45)
BASOPHILS # BLD AUTO: 3.2 % (ref 0–1.8)
BASOPHILS # BLD: 0.05 K/UL (ref 0–0.12)
BILIRUB SERPL-MCNC: 0.2 MG/DL (ref 0.1–1.5)
BUN SERPL-MCNC: 46 MG/DL (ref 8–22)
BURR CELLS BLD QL SMEAR: NORMAL
CALCIUM SERPL-MCNC: 9.4 MG/DL (ref 8.5–10.5)
CHLORIDE SERPL-SCNC: 111 MMOL/L (ref 96–112)
CO2 SERPL-SCNC: 10 MMOL/L (ref 20–33)
CORTIS SERPL-MCNC: 101 UG/DL (ref 0–23)
CREAT SERPL-MCNC: 1.14 MG/DL (ref 0.5–1.4)
CRP SERPL HS-MCNC: 23.44 MG/DL (ref 0–0.75)
EOSINOPHIL # BLD AUTO: 0 K/UL (ref 0–0.51)
EOSINOPHIL NFR BLD: 0 % (ref 0–6.9)
ERYTHROCYTE [DISTWIDTH] IN BLOOD BY AUTOMATED COUNT: 96 FL (ref 35.9–50)
ERYTHROCYTE [SEDIMENTATION RATE] IN BLOOD BY WESTERGREN METHOD: 30 MM/HOUR (ref 0–25)
ETHANOL BLD-MCNC: <10.1 MG/DL (ref 0–10)
FOLATE SERPL-MCNC: 11.8 NG/ML
GFR SERPLBLD CREATININE-BSD FMLA CKD-EPI: 53 ML/MIN/1.73 M 2
GLOBULIN SER CALC-MCNC: 2.3 G/DL (ref 1.9–3.5)
GLUCOSE SERPL-MCNC: 153 MG/DL (ref 65–99)
HCT VFR BLD AUTO: 24.7 % (ref 37–47)
HGB BLD-MCNC: 8.1 G/DL (ref 12–16)
HIV 1+2 AB+HIV1 P24 AG SERPL QL IA: NORMAL
HYPOCHROMIA BLD QL SMEAR: ABNORMAL
INR PPP: 3.77 (ref 0.87–1.13)
LACTATE BLD-SCNC: 1.2 MMOL/L (ref 0.5–2)
LDH SERPL L TO P-CCNC: 990 U/L (ref 107–266)
LYMPHOCYTES # BLD AUTO: 0.26 K/UL (ref 1–4.8)
LYMPHOCYTES NFR BLD: 17.4 % (ref 22–41)
MACROCYTES BLD QL SMEAR: ABNORMAL
MAGNESIUM SERPL-MCNC: 1.7 MG/DL (ref 1.5–2.5)
MANUAL DIFF BLD: NORMAL
MCH RBC QN AUTO: 30.3 PG (ref 27–33)
MCHC RBC AUTO-ENTMCNC: 32.8 G/DL (ref 33.6–35)
MCV RBC AUTO: 92.5 FL (ref 81.4–97.8)
MICROCYTES BLD QL SMEAR: ABNORMAL
MONOCYTES # BLD AUTO: 0.03 K/UL (ref 0–0.85)
MONOCYTES NFR BLD AUTO: 2.2 % (ref 0–13.4)
MORPHOLOGY BLD-IMP: NORMAL
MYELOCYTES NFR BLD MANUAL: 1.1 %
NEUTROPHILS # BLD AUTO: 1.14 K/UL (ref 2–7.15)
NEUTROPHILS NFR BLD: 68.5 % (ref 44–72)
NEUTS BAND NFR BLD MANUAL: 7.6 % (ref 0–10)
NRBC # BLD AUTO: 0 K/UL
NRBC BLD-RTO: 0 /100 WBC
OVALOCYTES BLD QL SMEAR: NORMAL
PHOSPHATE SERPL-MCNC: 1.3 MG/DL (ref 2.5–4.5)
PLATELET # BLD AUTO: 89 K/UL (ref 164–446)
PLATELET BLD QL SMEAR: NORMAL
PMV BLD AUTO: 9.6 FL (ref 9–12.9)
POIKILOCYTOSIS BLD QL SMEAR: NORMAL
POLYCHROMASIA BLD QL SMEAR: NORMAL
POTASSIUM SERPL-SCNC: 3.2 MMOL/L (ref 3.6–5.5)
PROCALCITONIN SERPL-MCNC: 0.62 NG/ML
PROT SERPL-MCNC: 5.3 G/DL (ref 6–8.2)
PROTHROMBIN TIME: 36.1 SEC (ref 12–14.6)
RBC # BLD AUTO: 2.67 M/UL (ref 4.2–5.4)
RBC BLD AUTO: PRESENT
SODIUM SERPL-SCNC: 150 MMOL/L (ref 135–145)
T PALLIDUM AB SER QL IA: NORMAL
TARGETS BLD QL SMEAR: NORMAL
TROPONIN T SERPL-MCNC: 15 NG/L (ref 6–19)
TSH SERPL DL<=0.005 MIU/L-ACNC: 0.38 UIU/ML (ref 0.38–5.33)
VIT B12 SERPL-MCNC: 1475 PG/ML (ref 211–911)
WBC # BLD AUTO: 1.5 K/UL (ref 4.8–10.8)

## 2022-01-01 PROCEDURE — G0475 HIV COMBINATION ASSAY: HCPCS

## 2022-01-01 PROCEDURE — 85610 PROTHROMBIN TIME: CPT

## 2022-01-01 PROCEDURE — 700111 HCHG RX REV CODE 636 W/ 250 OVERRIDE (IP): Performed by: STUDENT IN AN ORGANIZED HEALTH CARE EDUCATION/TRAINING PROGRAM

## 2022-01-01 PROCEDURE — 36415 COLL VENOUS BLD VENIPUNCTURE: CPT

## 2022-01-01 PROCEDURE — 83735 ASSAY OF MAGNESIUM: CPT

## 2022-01-01 PROCEDURE — 82607 VITAMIN B-12: CPT

## 2022-01-01 PROCEDURE — 770001 HCHG ROOM/CARE - MED/SURG/GYN PRIV*

## 2022-01-01 PROCEDURE — 84484 ASSAY OF TROPONIN QUANT: CPT

## 2022-01-01 PROCEDURE — 86780 TREPONEMA PALLIDUM: CPT

## 2022-01-01 PROCEDURE — 82533 TOTAL CORTISOL: CPT

## 2022-01-01 PROCEDURE — 82140 ASSAY OF AMMONIA: CPT

## 2022-01-01 PROCEDURE — 84443 ASSAY THYROID STIM HORMONE: CPT

## 2022-01-01 PROCEDURE — 84145 PROCALCITONIN (PCT): CPT

## 2022-01-01 PROCEDURE — 99232 SBSQ HOSP IP/OBS MODERATE 35: CPT | Performed by: INTERNAL MEDICINE

## 2022-01-01 PROCEDURE — 99497 ADVNCD CARE PLAN 30 MIN: CPT | Performed by: STUDENT IN AN ORGANIZED HEALTH CARE EDUCATION/TRAINING PROGRAM

## 2022-01-01 PROCEDURE — 84425 ASSAY OF VITAMIN B-1: CPT

## 2022-01-01 PROCEDURE — 83605 ASSAY OF LACTIC ACID: CPT

## 2022-01-01 PROCEDURE — 86140 C-REACTIVE PROTEIN: CPT

## 2022-01-01 PROCEDURE — 82077 ASSAY SPEC XCP UR&BREATH IA: CPT

## 2022-01-01 PROCEDURE — 80053 COMPREHEN METABOLIC PANEL: CPT

## 2022-01-01 PROCEDURE — 82746 ASSAY OF FOLIC ACID SERUM: CPT

## 2022-01-01 PROCEDURE — 85025 COMPLETE CBC W/AUTO DIFF WBC: CPT

## 2022-01-01 PROCEDURE — 83615 LACTATE (LD) (LDH) ENZYME: CPT

## 2022-01-01 PROCEDURE — 84100 ASSAY OF PHOSPHORUS: CPT

## 2022-01-01 PROCEDURE — 99215 OFFICE O/P EST HI 40 MIN: CPT | Performed by: NURSE PRACTITIONER

## 2022-01-01 PROCEDURE — 85007 BL SMEAR W/DIFF WBC COUNT: CPT

## 2022-01-01 PROCEDURE — 85652 RBC SED RATE AUTOMATED: CPT

## 2022-01-01 PROCEDURE — 99223 1ST HOSP IP/OBS HIGH 75: CPT | Mod: AI,25 | Performed by: STUDENT IN AN ORGANIZED HEALTH CARE EDUCATION/TRAINING PROGRAM

## 2022-01-01 RX ORDER — ONDANSETRON 4 MG/1
TABLET, FILM COATED ORAL
COMMUNITY
End: 2022-01-01

## 2022-01-01 RX ORDER — LORAZEPAM 2 MG/ML
1 CONCENTRATE ORAL
Status: DISCONTINUED | OUTPATIENT
Start: 2022-01-01 | End: 2022-01-01 | Stop reason: HOSPADM

## 2022-01-01 RX ORDER — LORAZEPAM 2 MG/ML
1 INJECTION INTRAMUSCULAR
Status: DISCONTINUED | OUTPATIENT
Start: 2022-01-01 | End: 2022-01-01 | Stop reason: HOSPADM

## 2022-01-01 RX ORDER — GLYCOPYRROLATE 0.2 MG/ML
0.2 INJECTION INTRAMUSCULAR; INTRAVENOUS 3 TIMES DAILY PRN
Status: DISCONTINUED | OUTPATIENT
Start: 2022-01-01 | End: 2022-01-01 | Stop reason: HOSPADM

## 2022-01-01 RX ORDER — ENOXAPARIN SODIUM 100 MG/ML
30 INJECTION SUBCUTANEOUS DAILY
Status: DISCONTINUED | OUTPATIENT
Start: 2022-01-01 | End: 2022-01-01

## 2022-01-01 RX ORDER — SODIUM CHLORIDE AND POTASSIUM CHLORIDE 300; 900 MG/100ML; MG/100ML
INJECTION, SOLUTION INTRAVENOUS CONTINUOUS
Status: DISCONTINUED | OUTPATIENT
Start: 2022-01-01 | End: 2022-01-01

## 2022-01-01 RX ORDER — BISACODYL 10 MG
10 SUPPOSITORY, RECTAL RECTAL
Status: DISCONTINUED | OUTPATIENT
Start: 2022-01-01 | End: 2022-01-01

## 2022-01-01 RX ORDER — ANASTROZOLE 1 MG/1
1 TABLET ORAL DAILY
Qty: 30 TABLET | Refills: 0 | Status: SHIPPED | OUTPATIENT
Start: 2022-01-01 | End: 2022-01-01 | Stop reason: SDUPTHER

## 2022-01-01 RX ORDER — POLYVINYL ALCOHOL 14 MG/ML
2 SOLUTION/ DROPS OPHTHALMIC EVERY 6 HOURS PRN
Status: DISCONTINUED | OUTPATIENT
Start: 2022-01-01 | End: 2022-01-01 | Stop reason: HOSPADM

## 2022-01-01 RX ORDER — ONDANSETRON 4 MG/1
8 TABLET, ORALLY DISINTEGRATING ORAL EVERY 8 HOURS PRN
Status: DISCONTINUED | OUTPATIENT
Start: 2022-01-01 | End: 2022-01-01 | Stop reason: HOSPADM

## 2022-01-01 RX ORDER — ONDANSETRON 2 MG/ML
4 INJECTION INTRAMUSCULAR; INTRAVENOUS EVERY 4 HOURS PRN
Status: ACTIVE | OUTPATIENT
Start: 2022-01-01 | End: 2022-01-01

## 2022-01-01 RX ORDER — ATROPINE SULFATE 10 MG/ML
2 SOLUTION/ DROPS OPHTHALMIC EVERY 4 HOURS PRN
Status: DISCONTINUED | OUTPATIENT
Start: 2022-01-01 | End: 2022-01-01 | Stop reason: HOSPADM

## 2022-01-01 RX ORDER — MORPHINE SULFATE 10 MG/ML
10 INJECTION, SOLUTION INTRAMUSCULAR; INTRAVENOUS
Status: DISCONTINUED | OUTPATIENT
Start: 2022-01-01 | End: 2022-01-01 | Stop reason: HOSPADM

## 2022-01-01 RX ORDER — POLYETHYLENE GLYCOL 3350 17 G/17G
1 POWDER, FOR SOLUTION ORAL
Status: DISCONTINUED | OUTPATIENT
Start: 2022-01-01 | End: 2022-01-01

## 2022-01-01 RX ORDER — ONDANSETRON 4 MG/1
4 TABLET, ORALLY DISINTEGRATING ORAL EVERY 4 HOURS PRN
Status: DISCONTINUED | OUTPATIENT
Start: 2022-01-01 | End: 2022-01-01

## 2022-01-01 RX ORDER — OXYCODONE HYDROCHLORIDE 5 MG/1
TABLET ORAL
COMMUNITY

## 2022-01-01 RX ORDER — OMEGA-3 FATTY ACIDS/FISH OIL 300-1000MG
CAPSULE ORAL
COMMUNITY

## 2022-01-01 RX ORDER — GLYCOPYRROLATE 1 MG/1
1 TABLET ORAL 3 TIMES DAILY PRN
Status: DISCONTINUED | OUTPATIENT
Start: 2022-01-01 | End: 2022-01-01 | Stop reason: HOSPADM

## 2022-01-01 RX ORDER — MIDODRINE HYDROCHLORIDE 5 MG/1
10 TABLET ORAL
Status: DISCONTINUED | OUTPATIENT
Start: 2022-01-01 | End: 2022-01-01

## 2022-01-01 RX ORDER — ANASTROZOLE 1 MG/1
1 TABLET ORAL DAILY
Qty: 40 TABLET | Refills: 0 | Status: SHIPPED | OUTPATIENT
Start: 2022-01-01 | End: 2022-01-01

## 2022-01-01 RX ORDER — ACETAMINOPHEN 650 MG/1
650 SUPPOSITORY RECTAL EVERY 4 HOURS PRN
Status: DISCONTINUED | OUTPATIENT
Start: 2022-01-01 | End: 2022-01-01 | Stop reason: HOSPADM

## 2022-01-01 RX ORDER — ACETAMINOPHEN 325 MG/1
650 TABLET ORAL EVERY 4 HOURS PRN
Status: DISCONTINUED | OUTPATIENT
Start: 2022-01-01 | End: 2022-01-01 | Stop reason: HOSPADM

## 2022-01-01 RX ORDER — AMOXICILLIN 250 MG
2 CAPSULE ORAL 2 TIMES DAILY
Status: DISCONTINUED | OUTPATIENT
Start: 2022-01-01 | End: 2022-01-01

## 2022-01-01 RX ORDER — SODIUM CHLORIDE, SODIUM LACTATE, POTASSIUM CHLORIDE, AND CALCIUM CHLORIDE .6; .31; .03; .02 G/100ML; G/100ML; G/100ML; G/100ML
500 INJECTION, SOLUTION INTRAVENOUS
Status: DISCONTINUED | OUTPATIENT
Start: 2022-01-01 | End: 2022-01-01

## 2022-01-01 RX ORDER — ONDANSETRON 2 MG/ML
8 INJECTION INTRAMUSCULAR; INTRAVENOUS EVERY 8 HOURS PRN
Status: DISCONTINUED | OUTPATIENT
Start: 2022-01-01 | End: 2022-01-01 | Stop reason: HOSPADM

## 2022-01-01 RX ORDER — ACETAMINOPHEN 325 MG/1
650 TABLET ORAL EVERY 6 HOURS PRN
Status: ACTIVE | OUTPATIENT
Start: 2022-01-01 | End: 2022-01-01

## 2022-01-01 RX ORDER — MORPHINE SULFATE 10 MG/ML
5 INJECTION, SOLUTION INTRAMUSCULAR; INTRAVENOUS
Status: DISCONTINUED | OUTPATIENT
Start: 2022-01-01 | End: 2022-01-01 | Stop reason: HOSPADM

## 2022-01-01 RX ORDER — PROCHLORPERAZINE MALEATE 5 MG/1
TABLET ORAL
COMMUNITY

## 2022-01-01 RX ORDER — POTASSIUM CHLORIDE 750 MG/1
TABLET, FILM COATED, EXTENDED RELEASE ORAL
Qty: 60 TABLET | Refills: 0 | Status: SHIPPED | OUTPATIENT
Start: 2022-01-01

## 2022-01-01 RX ADMIN — MORPHINE SULFATE 5 MG: 10 INJECTION INTRAVENOUS at 09:29

## 2022-01-01 RX ADMIN — MORPHINE SULFATE 5 MG: 10 INJECTION INTRAVENOUS at 23:50

## 2022-01-01 RX ADMIN — MORPHINE SULFATE 5 MG: 10 INJECTION INTRAVENOUS at 11:17

## 2022-01-01 ASSESSMENT — ENCOUNTER SYMPTOMS
DIZZINESS: 0
MYALGIAS: 0
TINGLING: 0
PALPITATIONS: 0
SHORTNESS OF BREATH: 0
CONSTIPATION: 1
DIARRHEA: 0
NAUSEA: 0
WEIGHT LOSS: 0
HEADACHES: 1
VOMITING: 0
COUGH: 0
CHILLS: 0
WHEEZING: 0
INSOMNIA: 0
FEVER: 0

## 2022-01-01 ASSESSMENT — LIFESTYLE VARIABLES: SUBSTANCE_ABUSE: 1

## 2022-01-01 ASSESSMENT — FIBROSIS 4 INDEX
FIB4 SCORE: 0.93
FIB4 SCORE: 0.91

## 2022-01-01 ASSESSMENT — PAIN DESCRIPTION - PAIN TYPE
TYPE: ACUTE PAIN

## 2022-01-01 ASSESSMENT — PATIENT HEALTH QUESTIONNAIRE - PHQ9: CLINICAL INTERPRETATION OF PHQ2 SCORE: 0

## 2022-01-05 NOTE — PROGRESS NOTES
Pt left message on cancer nurse navigation line confirming her appointment tomorrow at 10:30 with medical oncology.  Message relayed to medical oncology.

## 2022-01-19 NOTE — TELEPHONE ENCOUNTER
Requested Prescriptions     Pending Prescriptions Disp Refills   • anastrozole (ARIMIDEX) 1 MG Tab 30 Tablet 0     Sig: Take 1 Tablet by mouth every day.       Received request via: Patient    Was the patient seen in the last year in this department? Yes    Does the patient have an active prescription (recently filled or refills available) for medication(s) requested? No

## 2022-02-03 NOTE — TELEPHONE ENCOUNTER
Pt called back requesting a refill on her anastrozole.  Pt stated this refill should last her until her next appointment.

## 2022-02-07 NOTE — TELEPHONE ENCOUNTER
Patient given 30 day supply of Arimidex on 1/19/22 which should get her to 2/19/22. She is scheduled to be seen on 3/23 and requesting enough medication until her follow up visit. Therefore I will send another 40 tabs which will be plenty for her to get to her visit in the office.

## 2022-02-07 NOTE — TELEPHONE ENCOUNTER
Received request via: Patient    Was the patient seen in the last year in this department? Yes    Does the patient have an active prescription (recently filled or refills available) for medication(s) requested? No     Initially filled 01/019/22     Qty: 30 no refills    Patient is requesting a refill to get there throught to her next appt on 03/23.

## 2022-02-28 NOTE — LETTER
March 1, 2022        Dougie Baptiste  [address]  [address]  [place] [address]        Dear Dougie:  Here are the lab slips, also send to [place] [facility]. See you soon.     If you have any questions or concerns, please don't hesitate to call.        Sincerely,        MARIE Reveles.    Electronically Signed

## 2022-03-01 NOTE — TELEPHONE ENCOUNTER
Patient has appointment on 3/23/22 with Sara Sheehan, and wants to get her labs drawn [place] [facility] [place]  Patient requesting lab orders be mailed to her, and she will take them with her to the lab.    I told the patient that sometimes we will FAX the orders to the lab directly.  Please let the patient know if we will be faxing to [facility], or mailing the orders to the patient.  
Sent labs to pt home as well and [facility]..  Attempted to call pt no answer lv   
[phone]

## 2022-03-23 NOTE — PROGRESS NOTES
"Subjective     Dougie Baptiste is a 66 y.o. female who presents with Breast Cancer (FV)            HPI   Ms. Baptiste presents for evaluation of anastrozole for continued treatment of neglected stage IIIb (yT4, N1, M0), grade 2, invasive ductal carcinoma of the left breast: ER strongly positive, NM 10-20%, Ki-67 95%, HER-2 negative. Patient is unaccompanied for today's visit.     Patient reportedly noted left breast mass in 2010 but delayed follow-up and missed multiple appointments. She ultimately underwent breast biopsy and established care in our office in 8/2018 and initiated anastrozole. She was referred to radiation oncology but did not follow-up. Lesions slowly improved with continued anastrozole and she ultimately underwent left modified radical mastectomy with axillary dissection on 12/27/2019 per Dr. Ingram.     Patient with recurrent lesions at chest wall at her 2/2021 visit and was referred to Rad Onc per surgeon. After repeated follow up delays she established care with Dr. Diego at [facility]. PET completed 8/20/21 diffuse metastatic disease in bones, liver, chest wall. Patient reports XRT simulation in approx. August (2021) but has not initiated therapy to date, due to transportation and social limitations.    She was advised in 10/2021 to initiate Ibrance/letrozole for systematic therapy, which was deferred pending right hip surgery. She was reportedly scheduled but surgery was not completed \"due to COVID testing\". Records are not readily available. She has not yet initiated Ibrance.    Chest wall lesions are worsening, dressings in place, nodularity at left aspect of chest wall and axilla is increased and more noticeable. Repeated discussion indicates she still cannot commit and participate in the recommended treatment regimen that requires travel or being at the infusion center (as well as radiation therapy) for several hours. She remains amenable to oral chemotherapy and is willing " to start Ibrance even though hip surgery has not been completed.       Review of Systems   Constitutional: Negative for chills, fever, malaise/fatigue and weight loss.   Respiratory: Negative for cough, shortness of breath and wheezing.    Cardiovascular: Positive for chest pain (r/t chest wall lesion). Negative for palpitations and leg swelling.        Can hear BP in her ears at times - ok today   Gastrointestinal: Positive for constipation (stool softener daily). Negative for diarrhea, nausea and vomiting.   Genitourinary: Negative for dysuria.   Musculoskeletal: Positive for joint pain (right hip - constant; still has not had surgery). Negative for myalgias.   Skin:        Chest wall lesions, new nodularity at left chest/axilla   Neurological: Positive for headaches (last week). Negative for dizziness and tingling.   Psychiatric/Behavioral: Positive for substance abuse (1 pack per week). The patient does not have insomnia.            Allergies   Allergen Reactions   • Meloxicam Hives     Everything ichty bumps everywhere per pt    • Other Misc Runny Nose and Itching     Cats, dust, and pollen    • Versed Vomiting and Nausea     Patient denies issues with this medication currently (10/20/2021)           Current Outpatient Medications on File Prior to Visit   Medication Sig Dispense Refill   • Ibuprofen 200 MG Cap Advil     • Acetaminophen (TYLENOL) 325 MG Cap Tylenol     • oxyCODONE immediate-release (ROXICODONE) 5 MG Tab oxycodone 5 mg tablet   Take 1 tablet every 4 hours by oral route as needed for 5 days.     • prochlorperazine (COMPAZINE) 5 MG Tab prochlorperazine maleate 5 mg tablet     • multivitamin (THERAGRAN) Tab Take 1 Tablet by mouth every day.     • docusate sodium (COLACE) 50 MG Cap Take 1 Capsule by mouth 2 times a day. 60 Capsule 0   • VENTOLIN  (90 Base) MCG/ACT Aero Soln inhalation aerosol INHALE 2 PUFFS BY MOUTH EVERY 6 HOURS AS NEEDED SHORTNESS OF BREATH 18 g 6   • acetaminophen (TYLENOL)  "500 MG Tab Take 1,000 mg by mouth every 6 hours as needed. (Patient not taking: No sig reported)     • aspirin (ASPIRIN 81) 81 MG EC tablet Take 1 Tablet by mouth 2 times a day. (Patient not taking: No sig reported) 60 Tablet 0   • Palbociclib 100 MG Tab Take 1 tab by mouth for 3 weeks ON & 1 week OFF (Patient not taking: No sig reported) 21 Tablet 3   • letrozole (FEMARA) 2.5 MG Tab Take 1 Tablet by mouth every day. (Patient not taking: No sig reported) 90 Tablet 1   • prochlorperazine (COMPAZINE) 10 MG Tab Take 1 Tablet by mouth every 6 hours as needed. (Patient not taking: No sig reported) 30 Tablet 3   • Calcium Carbonate-Vitamin D 600-400 MG-UNIT Tab TAKE 1 TABLET BY MOUTH ONCE DAILY (Patient not taking: Reported on 10/20/2021) 90 Tab 0   • ascorbic acid (ASCORBIC ACID) 500 MG Tab Take 2 Tabs by mouth every day. (Patient not taking: No sig reported) 180 Tab 3   • Prenatal Vit-Fe Fumarate-FA (PRENATAL 1+1 PO) Take  by mouth. (Patient not taking: No sig reported)     • Ibuprofen (ADVIL PO) Take 500 mg by mouth as needed. (Patient not taking: No sig reported)     • alendronate (FOSAMAX) 70 MG Tab TAKE ONE TABLET BY MOUTH EVERY 7 DAYS ON MONDAY (Patient not taking: No sig reported) 12 Tab 3   • Calcium 600-400 MG-UNIT Chew Tab Take 1 tablet by mouth every day. (Patient not taking: No sig reported) 90 Tab 3   • meloxicam (MOBIC) 7.5 MG Tab Take 1 Tab by mouth every day. (Patient not taking: No sig reported) 30 Tab 2     No current facility-administered medications on file prior to visit.           Objective     /72   Pulse 94   Temp 36.9 °C (98.4 °F) (Temporal)   Resp 16   Ht 1.64 m (5' 4.57\")   Wt 43.2 kg (95 lb 3.8 oz)   SpO2 96%   BMI 16.06 kg/m²      Physical Exam  Vitals reviewed.   Constitutional:       General: She is not in acute distress.     Appearance: She is well-developed. She is not diaphoretic.   HENT:      Head: Normocephalic and atraumatic.      Mouth/Throat:      Pharynx: No " oropharyngeal exudate.   Eyes:      General: No scleral icterus.        Right eye: No discharge.         Left eye: No discharge.      Conjunctiva/sclera: Conjunctivae normal.      Pupils: Pupils are equal, round, and reactive to light.   Neck:      Thyroid: No thyromegaly.   Cardiovascular:      Rate and Rhythm: Normal rate and regular rhythm.      Heart sounds: Normal heart sounds. No murmur heard.    No friction rub. No gallop.   Pulmonary:      Effort: Pulmonary effort is normal. No respiratory distress.      Breath sounds: Normal breath sounds. No wheezing.   Chest:   Breasts:      Right: No axillary adenopathy or supraclavicular adenopathy.      Left: No axillary adenopathy or supraclavicular adenopathy.            Comments: Chest walls enlarging, dressing in place; new palpable nodularity ar left chest wall/axilla - like peppercorn  Abdominal:      General: Bowel sounds are normal. There is no distension.      Palpations: Abdomen is soft.      Tenderness: There is no abdominal tenderness.   Musculoskeletal:         General: No tenderness. Normal range of motion.      Cervical back: Normal range of motion and neck supple.   Lymphadenopathy:      Head:      Right side of head: No submental, submandibular, tonsillar, preauricular, posterior auricular or occipital adenopathy.      Left side of head: No submental, submandibular, tonsillar, preauricular, posterior auricular or occipital adenopathy.      Cervical: No cervical adenopathy.      Right cervical: No superficial or deep cervical adenopathy.     Left cervical: No superficial or deep cervical adenopathy.      Upper Body:      Right upper body: No supraclavicular, axillary, pectoral or epitrochlear adenopathy.      Left upper body: No supraclavicular, axillary, pectoral or epitrochlear adenopathy.   Skin:     General: Skin is warm and dry.      Coloration: Skin is not pale.      Findings: No erythema or rash.   Neurological:      Mental Status: She is alert  and oriented to person, place, and time.   Psychiatric:         Behavior: Behavior normal.                     PET (@ [facility])  8/20/21  Mahesh Gonzalez MD - 08/20/2021   Formatting of this note might be different from the original.   EXAM: Whole body PET/CT exam (eyes to thighs).     TECHNIQUE: Multiplanar PET imaging from the skull base to the mid thighs was performed after the intravenous administration of 9.9 mCi of F-18 fluorodeoxyglucose (FDG). A noncontrast CT exam of the chest, abdomen, pelvis, and proximal lower extremities was performed concurrently for attenuation correction purposes. CT exam is of limited diagnostic utility. Blood glucose measured 112.     HISTORY: 66-year-old female with a history of left breast cancer     COMPARISON: None available.     FINDINGS:     CT exam:   Head/neck: No mass or adenopathy noted.     Chest: Numerous hypermetabolic nodules in the superficial soft tissues of the left chest wall. For example a 3 cm left chest nodule demonstrates SUV max 7.1.   Bilateral axillary and left subpectoral lymphadenopathy. For example a 1 cm left axillary lymph node demonstrates SUV max 3.2.     Breathing artifact in the lungs which limits evaluation. No FDG avid pulmonary nodule.     Heart is normal in size and without a pericardial effusion. Bilateral   mastectomy.     Abdomen/pelvis: Numerous hypermetabolic hypoattenuating liver masses. For example at the hepatic dome on the right a 1.5 cm mass  demonstrates SUV max 5.7.     No abnormal fluid collection noted. Calcifications within the pancreas without active inflammatory changes. Moderate atherosclerosis of the abdominal aorta without aneurysm. Solid organs and bowel are otherwise unremarkable.     Bones: Numerous hypermetabolic sclerotic osseous lesions throughout the axial and appendicular skeleton. For example in the right iliac wing there is a sclerotic lesion with SUV max 6.1. Hypermetabolic osseous lesion involving the right  posterior 3rd rib with SUV max 3.5. Degenerative changes. Notably advanced degenerative changes of the right hip.     PET exam: Otherwise degenerative and physiologic FDG uptake.     IMPRESSION:   Hypermetabolic left chest wall subcutaneous nodules, bilateral   axillary and left subpectoral lymphadenopathy, liver lesions, and numerous   sclerotic osseous lesions throughout the axial and appendicular skeleton which are suspicious for metastatic disease.          Assessment & Plan        1. Breast cancer, stage 3, left (HCC)     2. Encounter for long-term current use of high risk medication     3. Osteopenia of neck of left femur     4. Hip pain     5. Bone metastases (HCC)     6. Thrombocytosis            1.  Hip pain/bony mets: Pain persists, she was scheduled for surgery in 10/2021. Surgery was not completed, records will be requested, should she ever undergo surgery she will likely require palliative XRT to site afterward.     2.  Osteopenia: Patient prescribed weekly alendronate per PCP. She discontinued in 2/2021 in anticipation of oral surgery. She underwent tooth extraction on 3/11/21 and now has dentures. She is again encouraged to resume weekly Fosamax.     3.  Thrombocytosis: Noted since 10/2021 visit. Results reviewed with Dr. Whiting who suspects acute phase reaction and anticipates resolution once treatment starts. OK for patient to start daily 81 mg ASA. Continue to monitor.    4.  Breast cancer: Patient with left breast lesion in 2010 but did not establish care until 8/2018. She was initiated on anastrozole, did not receive XRT, and was ultimately able to undergo left mastectomy in 12/2019. Progression of disease noted at chest wall 2/2021, breast surgeon referred to radiation oncology. Patient is not able to complete recommended treatment due to lack of transportation and social limitations, she cannot leave her dog unattended for the time need to complete treatment. These limitationsalso preclude her  participation in systemic therapy at an Infusion Center but she is now amenable to systemic therapy if oral.    She was advised to initiate Ibrance/letrozole but held off start pending hip surgery - which has yet to be completed. Cancer is progressing at chest wall and left axilla. She is advised to discontinued anastrozole and proceed with Ibrance 100 mg daily (3 weeks on, 1 week off); letrozole daily. She will repeat CBC ~10-14 days after starting Ibrance and return for tox check, sooner as needed.          The patient verbalized agreement and understanding of current plan. All questions and concerns were addressed at time of visit.    Please note that this dictation was created using voice recognition software. I have made every reasonable attempt to correct obvious errors, but I expect that there are errors of grammar and possibly content that I did not discover before finalizing the note.

## 2022-03-23 NOTE — TELEPHONE ENCOUNTER
Spoke with [id number] regarding sending out pt's Ibrance (palbociclib). Verbal order placed to reinstate previous shipment at this time. [id number] stated it will take a few days. Prior authorization will be expiring the first week of April and they state med will go out prior to this time, and their auth team will be working on a new auth for refills.

## 2022-03-24 NOTE — TELEPHONE ENCOUNTER
Per request of Sara DURAN, called pt to inform her to begin taking letrozole once she is out of anastrozole (instead of refilling the anastrozole).  Pt verbalized understanding & stated she had 2 tabs left of anastrozole & will then start the letrozole.  Pt also stated that she spoke with [id number] this AM regarding her Ibrance & she was told they will call her within the next day or so when it is ready to be shipped.  Pt confirmed she will contact Med/Onc office RN when she takes her 1st dose so that appropriate labs & f/u appts can be made.

## 2022-03-28 NOTE — TELEPHONE ENCOUNTER
Pt called to let office know her Ibrance should be arriving tomorrow. Discussed two week labs after she starts taking her Ibrance and follow-up appt with APRN. Pt states she can only have appts on Thursdays due to transportation difficulties. Instructed pt to call back when she has her medication in hand so a calendar can be sent to her home. Pt verbalized understanding at this time.

## 2022-03-29 NOTE — TELEPHONE ENCOUNTER
Oral Chemo Compliance review for palbociclib (Ibrance)    Current dose:  100 mg PO on Days 1 - 21 of each 28-day cycle

## 2022-03-29 NOTE — TELEPHONE ENCOUNTER
received her Ibrance today & took her first dose. Per Sara, she is to have labs done in 2 weeks (pt already aware) & needs a 1 month follow-up in office.    Pt is stefano for a FV 4/28/2022

## 2022-03-29 NOTE — TELEPHONE ENCOUNTER
Patient received her Ibrance, and took one.  She is filling out the form they included to let them know as well.

## 2022-03-30 PROBLEM — C50.919 PRIMARY MALIGNANT NEOPLASM OF FEMALE BREAST (HCC): Status: ACTIVE | Noted: 2022-01-01

## 2022-03-30 PROBLEM — C50.919 PRIMARY MALIGNANT NEOPLASM OF FEMALE BREAST (HCC): Status: RESOLVED | Noted: 2022-01-01 | Resolved: 2022-01-01

## 2022-04-05 NOTE — TELEPHONE ENCOUNTER
Pt returning Leigh KELLY's call from earlier today. I relayed the message per this encounter and the patient decided she did not jamal

## 2022-04-05 NOTE — TELEPHONE ENCOUNTER
Called pt to see if she wants to change her office visit to a sooner date d/t only having 12 tab of oxycodone left.  Pt did not answer, left message with contact info.    Staff:  Please re-schedule pt's office visit from 4/28 to a sooner date.  Sara DURAN will discuss a refill of pt's oxycodone in person (Dr. Ingram gave last Rx) but pt will need to be seen in office first.  Pt reported only 12 tabs left as of 4/4, so if pt takes 1 tab/day then she'll run out by 4/16 & will need to be seen prior to that if pt decides to.

## 2022-04-11 NOTE — TELEPHONE ENCOUNTER
Pt called to make sure we were able to send over her lab orders to [facility]. I called [place] and they confirmed they did receive the faxed orders.

## 2022-04-25 NOTE — TELEPHONE ENCOUNTER
Called pt & left message for her to return call to office RN at earliest convenience.    Staff:  Transfer to RN    RN:  Inform pt that oral potassium Rx was sent to [facility].  Review dosing instructions with pt.  Pt is to take 2 tabs (of 10 meq) 3x/day for 5 days (for a total of 60 meq daily).  After 5 days, APRN will re-assess quantity.

## 2022-04-26 NOTE — TELEPHONE ENCOUNTER
Attempted to reach pt by phone again, pt did not answer & phone went directly to voicemail.  RN left another message asking pt to return call regarding oral potassium Rx & dosing instructions.

## 2022-04-28 NOTE — TELEPHONE ENCOUNTER
Due to unreturned phone calls to office & no show appt, RN called pt's emergency contact, Tracy Marr.  Per Tracy, she has not had personal contact with pt in awhile & suggested that RN call for a welfare check.    RN called [place] [employer] & requested a welfare check on pt.  [employer] Dept to call office back with update.

## 2022-04-28 NOTE — TELEPHONE ENCOUNTER
Patient was a no-show to her 1 mo fv visit today with ROGER Ruiz, that was scheduled for 1300.  Sara has asked if you would mind reaching out to patient about this missed visit?  She indicated that you have spoken with the patient before and have a good rapport.  Thank you!

## 2022-04-29 NOTE — TELEPHONE ENCOUNTER
Per [place] [employer], welfare check was performed yesterday & pt is alive.  No further info was given.

## 2022-05-02 PROBLEM — R33.9 URINARY RETENTION: Status: ACTIVE | Noted: 2022-01-01

## 2022-05-02 PROBLEM — G93.40 ACUTE ENCEPHALOPATHY: Status: ACTIVE | Noted: 2022-01-01

## 2022-05-02 PROBLEM — A41.9 SEPSIS (HCC): Status: ACTIVE | Noted: 2022-01-01

## 2022-05-02 NOTE — PROGRESS NOTES
[facility] TRIAGE OFFICER DIRECT ADMISSION REPORT  Transferring facility: [facility]  Transferring physician: Baldo Carranza  Chief complaint: altered mental status, hypotension  Pertinent history & patient course: History of chronic pancreatitis, metastatic breast cancer with unknown current treatment.  BIBA after being found by the police on a welfare check, in bed, moaning, altered.  In the ED, BP 84/53 improved with IV fluids.  Patient has chronic chest wall wound.  Transferring physician does not know patient's goals of care and no POLST was delivered with patient.  Transferring facility does not have inpatient services.  Pertinent imaging & lab results: WBC 1.95, Na 148, K 3.1, Cr 1.48, INR 7.8.  Patient received 2 units of vitamin K prior to transfer.  UA unremarkable.  CT head with no acute abnormality.  CT chest, abdomen, pelvis with no PE or pneumonia but shows multiple abdominal mets and lytic lesions in bones.  Code Status: Unknown per transferring provider, I personally verified with the transferring provider patient's code status and the transferring provider.  Further work up or recommendations per triage officer prior to transfer: none  Consultants called prior to transfer and pertinent input from consultants: none  Patient accepted for transfer: Yes  Consultants to be called upon arrival: palliative care if unable to determine goals with patient  Admission status: Inpatient.   Floor requested: telemetry  If ICU transfer, name of intensivist case discussed with and pertinent input from critical care: n/a    Please inform the triage officer upon arrival of the patient to [facility] for assignment of a hospitalist to perform admission.     For any question or concerns regarding the care of this patient, please reach out to the assigned hospitalist.

## 2022-05-03 PROBLEM — D61.818 PANCYTOPENIA (HCC): Status: ACTIVE | Noted: 2022-01-01

## 2022-05-03 PROBLEM — R79.89 ELEVATED LFTS: Status: ACTIVE | Noted: 2022-01-01

## 2022-05-03 PROBLEM — Z71.89 ACP (ADVANCE CARE PLANNING): Status: ACTIVE | Noted: 2019-02-20

## 2022-05-03 PROBLEM — R63.6 UNDERWEIGHT: Status: ACTIVE | Noted: 2022-01-01

## 2022-05-03 PROBLEM — E43 SEVERE PROTEIN-CALORIE MALNUTRITION (HCC): Status: ACTIVE | Noted: 2022-01-01

## 2022-05-03 NOTE — ASSESSMENT & PLAN NOTE
Discussed goal of care with patient's sister-in-law and patient's brother over phone --agreeable to DNR/DNI status.  Family requests patient be placed on comfort care status.  Patient's sister-in-law and brother will discuss with patient's daughter who [place] [facility] [place] [facility], likely to visit patient in a.m.  ACP: 40 minutes

## 2022-05-03 NOTE — DIETARY
Nutrition Services: Update    Pt transitioning to comfort care measures as of 5/2/22 per EMR. Currently on clear liquids diet with Boost Breeze TID.    Please consult RD if nutrition intervention desired. RD available PRN.

## 2022-05-03 NOTE — PROGRESS NOTES
1930 Pt arrived to floor. Pt minimally arousable to voice, opens eyes to stimuli. BP low. MD aware. Flex RN contacted family, MD contacted family regarding code status. Code status changed to comfort care. This RN updated multiple family members on goals of care for the patient for tonight. Palliative consult in for tomorrow.  Family states will come to bedside later tonight. Skin assessment complete. Pictures in chart. Coccyx red, nonblanching. Applied mepilex and initiating Q2tuwn. Center chest wound, cleansed and dressed with ABD pad. Pt appears comforable at rest. Unable to complete admission d/t pt mentation.  Will continue to monitor    Daughter Rajwinder at bedside. States she would like to be POA, phone number [phone]. Educated daughter about palliative consult tomorrow to assist in further goals of care.       Coccyx    Chest      .

## 2022-05-03 NOTE — PROGRESS NOTES
Pt is only responding to pain at this time. Spoke with pt emergency contact rTacy Marr over the phone who gave pt consent for treatment.

## 2022-05-03 NOTE — PROGRESS NOTES
Hospital Medicine Daily Progress Note    Date of Service  5/3/2022    Interval Problem Update  Patient was seen and examined at bedside.  No acute events overnight. Patient is resting comfortably in bed and in no acute distress.     Daughter updated via phone  Case management to explore possible group home placement with [facility]  Comfort care measures    I have personally seen and examined the patient at bedside. I discussed the plan of care with family, bedside RN, charge RN,  and pharmacy.      Code Status  Comfort Care/DNR    Disposition  Patient is not medically cleared for discharge.   Anticipate discharge to to home with close outpatient follow-up.  I have placed the appropriate orders for post-discharge needs.    Review of Systems  Review of Systems   Unable to perform ROS: Acuity of condition        Physical Exam  Temp:  [36.6 °C (97.9 °F)-38.1 °C (100.6 °F)] 38.1 °C (100.6 °F)  Pulse:  [73-83] 83  Resp:  [12-13] 13  BP: (78-87)/(40-49) 78/40  SpO2:  [94 %-98 %] 94 %    Physical Exam  Vitals and nursing note reviewed.   Constitutional:       Appearance: She is ill-appearing and toxic-appearing.      Comments: Cachectic in appearance  Opens eyes to voice  Incomprehensible speech    HENT:      Head: Normocephalic.      Right Ear: External ear normal.      Left Ear: External ear normal.      Nose: No congestion.      Mouth/Throat:      Mouth: Mucous membranes are dry.      Pharynx: No oropharyngeal exudate.   Eyes:      General: No scleral icterus.     Pupils: Pupils are equal, round, and reactive to light.   Cardiovascular:      Rate and Rhythm: Normal rate and regular rhythm.      Heart sounds: No murmur heard.  Pulmonary:      Breath sounds: Rhonchi present.      Comments: Course upper airway sounds  Abdominal:      Palpations: Abdomen is soft.      Tenderness: There is no abdominal tenderness. There is no guarding or rebound.   Musculoskeletal:         General: No swelling or deformity.    Skin:     Capillary Refill: Capillary refill takes less than 2 seconds.      Coloration: Skin is not jaundiced.      Findings: No bruising.   Neurological:      General: No focal deficit present.      Comments: Moves all extremities spontaneously  Opens eyes to voice   Psychiatric:      Comments: Unable to assess         Fluids  No intake or output data in the 24 hours ending 05/03/22 1346    Laboratory  Recent Labs     05/02/22 1945   WBC 1.5*   RBC 2.67*   HEMOGLOBIN 8.1*   HEMATOCRIT 24.7*   MCV 92.5   MCH 30.3   MCHC 32.8*   RDW 96.0*   PLATELETCT 89*   MPV 9.6     Recent Labs     05/02/22 1945   SODIUM 150*   POTASSIUM 3.2*   CHLORIDE 111   CO2 10*   GLUCOSE 153*   BUN 46*   CREATININE 1.14   CALCIUM 9.4     Recent Labs     05/02/22 1945   INR 3.77*               Imaging  No orders to display        Assessment/Plan  * Metastatic breast cancer (HCC)  Assessment & Plan  Breast cancer, metastatic to liver, ribs/bones    Comfort care    Elevated LFTs  Assessment & Plan  Notable liver metastasis on CT  Comfort care    Underweight  Assessment & Plan  Comfort care    Severe protein-calorie malnutrition (HCC)  Assessment & Plan  Comfort care    Pancytopenia (HCC)  Assessment & Plan  Secondary to malignancy  Comfort care    Urinary retention  Assessment & Plan  Arenas in place, present at admission    Acute encephalopathy- (present on admission)  Assessment & Plan  Comfort care    Sepsis (HCC)- (present on admission)  Assessment & Plan  This is Sepsis Present on admission  SIRS criteria identified on my evaluation include: Tachypnea, with respirations greater than 20 per minute and Leukopenia, with WBC less than 4,000  Source is chest wall cellulitis   Sepsis protocol initiated    COMFORT care        Alcohol abuse, in remission- (present on admission)  Assessment & Plan  Per history  Comfort care    ACP (advance care planning)  Assessment & Plan  Discussed goal of care with patient's sister-in-law and patient's  brother over phone --agreeable to DNR/DNI status.  Family requests patient be placed on comfort care status.  Patient's sister-in-law and brother will discuss with patient's [place] [facility] [place] [facility], likely to visit patient in a.m.  ACP: 40 minutes    Current every day smoker- (present on admission)  Assessment & Plan  Per history  Comfort care     I have performed a physical exam and reviewed and updated ROS and Plan today (5/3/2022). In review of yesterday's note (5/2/2022), there are no changes except as documented above.

## 2022-05-03 NOTE — H&P
Hospital Medicine History & Physical Note    Date of Service  5/2/2022    Primary Care Physician  MARIE Reveles.    Consultants  Palliative care    Code Status  Comfort Care/DNR    Chief Complaint  No chief complaint on file.  Altered mental status, hypotension, failure to thrive    History of Presenting Illness  Dougie Baptiste is a 67 y.o. frail cachectic female with history of metastatic breast cancer who presented 5/2/2022 as direct transfer [facility] [place] higher level care.  History limited as patient is minimally verbal but arousable at time of evaluation.  History obtained from chart review and discussions with patient's listed family member (Tracy Marr [sister in law] and Lawrence Marr [Brother]) over the phone.  Per sister-in-law, patient is not particularly closely any family members and have been isolated for more than 10 years.  Sister-in-law has been in contact with patient over the past few years.  Patient missed oncology appointment on 3/23/2022, a [facility] check was sent and patient was found to be in altered state.  She had extensive work-up with CT head chest abdomen pelvis --no acute infectious etiology seen but notable diffuse metastasis disease.  She does have notable large chest wound, wound dressing is in place.  Patient was also found to be hypotensive with SBP in 80s, she was given 4 L IVF bolus prior to transfer.  Patient was seen by me at [facility] as direct admit.  At time of my evaluation, patient is arousable but otherwise appears very altered, SBP remains in 80s.    I personally called patient's family members to discuss goals of care --for aggressive medical management versus comfort care measure.  Patient's sister-in-law and brother elected for comfort care status.  Family stated patient's daughter is flying back to [employer] and would like to come visit patient tomorrow.  Changed to comfort care status.  Admitted to medicine service.    I discussed the  plan of care with patient, family and bedside RN.    Review of Systems  Review of Systems   Unable to perform ROS: Acuity of condition   All other systems reviewed and are negative.  Unable to obtain due to patient's acute clinical status    Past Medical History   has a past medical history of Anxiety, Arthritis, Asthma, Bowel habit changes (12/19/2019), Breast cancer (HCC) (12/19/2019), Breath shortness, Cancer (HCC) (02/2019), Chronic pain, Cirrhosis (HCC) (12/19/2019), Dental disorder (12/19/2019), Deviated septum, ETOH abuse, Hemorrhagic disorder (HCC), Osteoporosis, and Pneumonia.    Surgical History   has a past surgical history that includes laceration repair (Right, 6/18/2017); irrigation & debridement general (Right, 6/18/2017); wound closure general (Right, 6/18/2017); tonsillectomy (1960); mastectomy (Left, 12/23/2019); axillary node dissection (Left, 12/23/2019); and dilation and evacuation.     Family History  family history includes Arthritis in her father and mother; Cancer in her paternal aunt; Heart Attack in her father; Heart Disease in her father; Heart Failure in her father and mother; Hyperlipidemia in her father; Hypertension in her father; Osteoporosis in her mother.   Family history reviewed with patient. There is family history that is pertinent to the chief complaint.     Social History   reports that she has been smoking cigars. She has been smoking about 0.00 packs per day for the past 10.00 years. She has never used smokeless tobacco. She reports previous alcohol use. She reports previous drug use. Drug: Methamphetamines.    Allergies  Allergies   Allergen Reactions   • Meloxicam Hives     Everything ichty bumps everywhere per pt    • Other Misc Runny Nose and Itching     Cats, dust, and pollen    • Versed Vomiting and Nausea     Patient denies issues with this medication currently (10/20/2021)       Medications  Prior to Admission Medications   Prescriptions Last Dose Informant Patient  Reported? Taking?   Acetaminophen (TYLENOL) 325 MG Cap   Yes No   Sig: Tylenol   Calcium 600-400 MG-UNIT Chew Tab   No No   Sig: Take 1 tablet by mouth every day.   Patient not taking: No sig reported   Calcium Carbonate-Vitamin D 600-400 MG-UNIT Tab   No No   Sig: TAKE 1 TABLET BY MOUTH ONCE DAILY   Patient not taking: Reported on 10/20/2021   Ibuprofen (ADVIL PO)   Yes No   Sig: Take 500 mg by mouth as needed.   Patient not taking: No sig reported   Ibuprofen 200 MG Cap   Yes No   Sig: Advil   Palbociclib 100 MG Tab   No No   Sig: Take 1 tab by mouth for 3 weeks ON & 1 week OFF   Patient not taking: No sig reported   Prenatal Vit-Fe Fumarate-FA (PRENATAL 1+1 PO)   Yes No   Sig: Take  by mouth.   Patient not taking: No sig reported   VENTOLIN  (90 Base) MCG/ACT Aero Soln inhalation aerosol   No No   Sig: INHALE 2 PUFFS BY MOUTH EVERY 6 HOURS AS NEEDED SHORTNESS OF BREATH   acetaminophen (TYLENOL) 500 MG Tab   Yes No   Sig: Take 1,000 mg by mouth every 6 hours as needed.   Patient not taking: No sig reported   alendronate (FOSAMAX) 70 MG Tab   No No   Sig: TAKE ONE TABLET BY MOUTH EVERY 7 DAYS ON MONDAY   Patient not taking: No sig reported   ascorbic acid (ASCORBIC ACID) 500 MG Tab   No No   Sig: Take 2 Tabs by mouth every day.   Patient not taking: No sig reported   aspirin (ASPIRIN 81) 81 MG EC tablet   No No   Sig: Take 1 Tablet by mouth 2 times a day.   Patient not taking: No sig reported   docusate sodium (COLACE) 50 MG Cap   No No   Sig: Take 1 Capsule by mouth 2 times a day.   letrozole (FEMARA) 2.5 MG Tab   No No   Sig: Take 1 Tablet by mouth every day.   Patient not taking: No sig reported   meloxicam (MOBIC) 7.5 MG Tab   No No   Sig: Take 1 Tab by mouth every day.   Patient not taking: No sig reported   multivitamin (THERAGRAN) Tab   Yes No   Sig: Take 1 Tablet by mouth every day.   oxyCODONE immediate-release (ROXICODONE) 5 MG Tab   Yes No   Sig: oxycodone 5 mg tablet   Take 1 tablet every 4 hours  by oral route as needed for 5 days.   potassium chloride ER (KLOR-CON) 10 MEQ tablet   No No   Sig: Take 2 tabs by mouth 3x/day for 5 days   prochlorperazine (COMPAZINE) 10 MG Tab   No No   Sig: Take 1 Tablet by mouth every 6 hours as needed.   Patient not taking: No sig reported   prochlorperazine (COMPAZINE) 5 MG Tab   Yes No   Sig: prochlorperazine maleate 5 mg tablet      Facility-Administered Medications: None       Physical Exam  Temp:  [36.6 °C (97.9 °F)] 36.6 °C (97.9 °F)  Pulse:  [73] 73  Resp:  [12] 12  BP: (87)/(49) 87/49  SpO2:  [98 %] 98 %  Blood Pressure : (!) 87/49   Temperature: 36.6 °C (97.9 °F)   Pulse: 73   Respiration: 12   Pulse Oximetry: 98 %       Physical Exam  Vitals and nursing note reviewed.   Constitutional:       Comments: Frail, cachectic, severely underweight   HENT:      Head: Normocephalic and atraumatic.      Nose: Nose normal.      Mouth/Throat:      Mouth: Mucous membranes are dry.      Pharynx: Oropharynx is clear.   Eyes:      General: No scleral icterus.     Extraocular Movements: Extraocular movements intact.   Cardiovascular:      Rate and Rhythm: Normal rate and regular rhythm.      Pulses: Normal pulses.      Heart sounds:     No friction rub.      Comments: Notable large chest wound, wound dressing in place  Pulmonary:      Effort: Pulmonary effort is normal. No respiratory distress.      Breath sounds: No stridor. No wheezing.   Abdominal:      General: Bowel sounds are normal. There is no distension.      Palpations: Abdomen is soft.      Tenderness: There is no abdominal tenderness. There is no guarding.   Musculoskeletal:         General: No swelling or tenderness.      Cervical back: Neck supple.   Skin:     General: Skin is dry.      Capillary Refill: Capillary refill takes less than 2 seconds.      Coloration: Skin is pale.      Comments: Poor skin turgor   Neurological:      Mental Status: She is disoriented.      Comments: Arousable  Nonverbal  Unable to follow  commands   Psychiatric:      Comments: Unable to evaluate         Laboratory:  Recent Labs     05/02/22 1945   WBC 1.5*   RBC 2.67*   HEMOGLOBIN 8.1*   HEMATOCRIT 24.7*   MCV 92.5   MCH 30.3   MCHC 32.8*   RDW 96.0*   PLATELETCT 89*   MPV 9.6     Recent Labs     05/02/22 1945   SODIUM 150*   POTASSIUM 3.2*   CHLORIDE 111   CO2 10*   GLUCOSE 153*   BUN 46*   CREATININE 1.14   CALCIUM 9.4     Recent Labs     05/02/22 1945   ALTSGPT 43   ASTSGOT 157*   ALKPHOSPHAT 353*   TBILIRUBIN 0.2   GLUCOSE 153*     Recent Labs     05/02/22 1945   INR 3.77*     No results for input(s): NTPROBNP in the last 72 hours.      Recent Labs     05/02/22 1945   TROPONINT 15       Imaging:  No orders to display       no X-Ray or EKG requiring interpretation    Assessment/Plan:  Justification for Admission Status  I anticipate this patient appropriate for inpatient status    * Metastatic breast cancer (HCC)  Assessment & Plan  Breast cancer, metastatic to liver, ribs/bones    Comfort care    Elevated LFTs  Assessment & Plan  Notable liver metastasis on CT  Comfort care    Underweight  Assessment & Plan  Comfort care    Severe protein-calorie malnutrition (HCC)  Assessment & Plan  Comfort care    Pancytopenia (HCC)  Assessment & Plan  Secondary to malignancy  Comfort care    Acute encephalopathy- (present on admission)  Assessment & Plan  Comfort care    Alcohol abuse, in remission- (present on admission)  Assessment & Plan  Per history  Comfort care    Current every day smoker- (present on admission)  Assessment & Plan  Per history  Comfort care    Urinary retention  Assessment & Plan  Arenas in place, present at admission    Sepsis (HCC)- (present on admission)  Assessment & Plan  This is Sepsis Present on admission  SIRS criteria identified on my evaluation include: Tachypnea, with respirations greater than 20 per minute and Leukopenia, with WBC less than 4,000  Source is chest wall cellulitis   Sepsis protocol initiated    COMFORT  care        ACP (advance care planning)  Assessment & Plan  Discussed goal of care with patient's sister-in-law and patient's brother over phone --agreeable to DNR/DNI status.  Family requests patient be placed on comfort care status.  Patient's sister-in-law and brother will discuss with patient's daughter who is flying to [facility], likely to visit patient in a.m.  ACP: 40 minutes      VTE prophylaxis: None, comfort care

## 2022-05-03 NOTE — CARE PLAN
The patient is Watcher - Medium risk of patient condition declining or worsening    Shift Goals  Clinical Goals: comfort  Patient Goals: comfort    Progress made toward(s) clinical / shift goals:    Problem: Pain - Standard  Goal: Alleviation of pain or a reduction in pain to the patient’s comfort goal  Outcome: Progressing     Problem: Skin Integrity  Goal: Skin integrity is maintained or improved  Outcome: Progressing     Problem: Fall Risk  Goal: Patient will remain free from falls  Outcome: Progressing       Patient is not progressing towards the following goals:

## 2022-05-03 NOTE — PROGRESS NOTES
4 Eyes Skin Assessment Completed by LETICIA Daniels and LETICIA Arellano.    Head WDL  Ears WDL  Nose WDL  Mouth WDL  Neck WDL  Breast/Chest Weeping, wound present on admit. Changed drsg. Pictures in chart  Shoulder Blades WDL  Spine WDL  (R) Arm/Elbow/Hand Bruising  (L) Arm/Elbow/Hand Bruising  Abdomen WDL  Groin WDL  Scrotum/Coccyx/Buttocks Redness and Non-Blanching, pictures in chart  (R) Leg WDL  (L) Leg WDL  (R) Heel/Foot/Toe WDL  (L) Heel/Foot/Toe WDL          Devices In Places Arenas      Interventions In Place Heel Mepilex, Sacral Mepilex, Pillows, Q2 Turns, Barrier Cream and Heels Loaded W/Pillows    Possible Skin Injury Yes    Pictures Uploaded Into Epic Yes  Wound Consult Placed No, comfort care  RN Wound Prevention Protocol Ordered No, comfort care

## 2022-05-03 NOTE — ASSESSMENT & PLAN NOTE
This is Sepsis Present on admission  SIRS criteria identified on my evaluation include: Tachypnea, with respirations greater than 20 per minute and Leukopenia, with WBC less than 4,000  Source is chest wall cellulitis   Sepsis protocol initiated    COMFORT care

## 2022-05-04 NOTE — PROGRESS NOTES
1055: Pt appearing to be taking final breaths.   1100: Daughter to bedside with RN as pt expires.   1103: 2 RN pronounced. Daughter notified.   1103: MD notified.     1220: Called and spoke with Daughter again, mortuary choice placed in expiration navigator.

## 2022-05-04 NOTE — DISCHARGE SUMMARY
Death Summary    Cause of Death  Cardiac arrest due to respiratory failure due to metastatic breast cancer due to unknown etiology.    Comorbid Conditions at the Time of Death  Principal Problem:    Metastatic breast cancer (HCC) POA: Unknown  Active Problems:    Current every day smoker POA: Yes    ACP (advance care planning) POA: Unknown    Alcohol abuse, in remission POA: Yes    Sepsis (HCC) POA: Yes    Acute encephalopathy POA: Yes    Urinary retention POA: Unknown    Pancytopenia (HCC) POA: Unknown    Severe protein-calorie malnutrition (HCC) POA: Unknown    Underweight POA: Unknown    Elevated LFTs POA: Unknown  Resolved Problems:    * No resolved hospital problems. *      History of Presenting Illness and Hospital Course  Dougie Baptiste is a 67 y.o. frail cachectic female with history of metastatic breast cancer who presented 5/2/2022 as direct transfer from [facility] for higher level care.  History limited as patient is minimally verbal but arousable at time of evaluation. Patient missed oncology appointment on 3/23/2022, a [facility] check was sent and patient was found to be in altered state.  She had extensive work-up with CT head chest abdomen pelvis --no acute infectious etiology seen but notable diffuse metastasis disease.  She does have notable large chest wound.  Patient was also found to be hypotensive with SBP in 80s, she was given 4 L IVF bolus prior to transfer.  Patient was seen by me at [facility] as direct admit. Patient was made comfort care per family wishes.     Death Date: 05/04/22   Death Time: 1103         Pronounced By (RN1): Chantel Foreman  Pronounced By (RN2): Airam Evans

## 2022-05-04 NOTE — PROGRESS NOTES
Received report from NOC RN. Pt comfort care, resting in bed, unresponsive to voice, small facial grimaces to pain when repositioned. Pt on rebekah YOUNG in place for comfort measures. Bed alarm in place. Hourly rounding in place.

## 2022-05-04 NOTE — CARE PLAN
The patient is Watcher - Medium risk of patient condition declining or worsening    Shift Goals  Clinical Goals: comfort  Patient Goals: comfort    Progress made toward(s) clinical / shift goals:    Problem: Knowledge Deficit - Standard  Goal: Patient and family/care givers will demonstrate understanding of plan of care, disease process/condition, diagnostic tests and medications  Outcome: Progressing     Problem: Skin Integrity  Goal: Skin integrity is maintained or improved  Outcome: Progressing     Problem: Fall Risk  Goal: Patient will remain free from falls  Outcome: Progressing       Patient is not progressing towards the following goals:

## 2022-05-04 NOTE — PROGRESS NOTES
Hem/OncVisit    HD#:   2     Per review of Epic list, Dougie Baptiste was noted to be inpatient and was visited accordingly.    Confirmed with RN prior to entering room, patient had already  - daughter had been present at bedside.          CATHLEEN Reveles Hematology/Medical Oncology  Office of [facility]  Phone: [phone]  Fax: [phone]

## 2022-05-04 NOTE — CARE PLAN
The patient is Unstable - High likelihood or risk of patient condition declining or worsening    Shift Goals  Clinical Goals: comfort  Patient Goals: comfot    Progress made toward(s) clinical / shift goals:  Gave pain meds, pt appears comfortable    Patient is not progressing towards the following goals:      Problem: Knowledge Deficit - Standard  Goal: Patient and family/care givers will demonstrate understanding of plan of care, disease process/condition, diagnostic tests and medications  Outcome: Not Progressing     Problem: Hemodynamics  Goal: Patient's hemodynamics, fluid balance and neurologic status will be stable or improve  Outcome: Not Progressing     Problem: Fluid Volume  Goal: Fluid volume balance will be maintained  Outcome: Not Progressing     Problem: Urinary - Renal Perfusion  Goal: Ability to achieve and maintain adequate renal perfusion and functioning will improve  Outcome: Not Progressing     Problem: Respiratory  Goal: Patient will achieve/maintain optimum respiratory ventilation and gas exchange  Outcome: Not Progressing     Problem: Mechanical Ventilation  Goal: Safe management of artificial airway and ventilation  Outcome: Not Progressing  Goal: Successful weaning off mechanical ventilator, spontaneously maintains adequate gas exchange  Outcome: Not Progressing  Goal: Patient will be able to express needs and understand communication  Outcome: Not Progressing     Problem: Physical Regulation  Goal: Diagnostic test results will improve  Outcome: Not Progressing  Goal: Signs and symptoms of infection will decrease  Outcome: Not Progressing     Problem: Pain - Standard  Goal: Alleviation of pain or a reduction in pain to the patient’s comfort goal  Outcome: Not Progressing     Problem: Skin Integrity  Goal: Skin integrity is maintained or improved  Outcome: Not Progressing     Problem: Fall Risk  Goal: Patient will remain free from falls  Outcome: Not Progressing

## 2022-05-07 LAB — VIT B1 BLD-MCNC: NORMAL NMOL/L (ref 70–180)

## 2022-05-12 ENCOUNTER — APPOINTMENT (OUTPATIENT)
Dept: HEMATOLOGY ONCOLOGY | Facility: MEDICAL CENTER | Age: 67
End: 2022-05-12
Payer: MEDICARE

## 2022-05-17 NOTE — DOCUMENTATION QUERY
[facility]                                                                       Query Response Note      PATIENT:               PALMIRA SALINAS  ACCT #:                  [id number]  MRN:                     [id number]  :                      1955  ADMIT DATE:       2022 7:05 PM  DISCH DATE:        2022 11:03 AM  RESPONDING  PROVIDER #:        [id number]           QUERY TEXT:    Please clarify the following:    NOTE:  If an appropriate response is not listed below, please respond with a new note.      The patient's Clinical Indicators include:  Clinical indicators  In the ED, BP 84/53 improved with IV fluids. WBC 1.95, Na 148, K 3.1, Cr 1.48, INR 7.8.    Patient received 2 units of vitamin K prior to transfer.  UA unremarkable. Patient was also found to be hypotensive with SBP in 80s, she was given 4 L IVF bolus prior to transfer. Patient was made comfort care per family wishes.    H&P:  Sepsis (HCC)- (present on admission)  Assessment & Plan  This is Sepsis Present on admission  SIRS criteria identified on my evaluation include: Tachypnea, with respirations greater than 20 per minute and Leukopenia, with WBC less than 4,000  Source is chest wall cellulitis   Sepsis protocol initiated   COMFORT care    Sepsis documented as POA throughout from admit order to dc summary, source initially noted as cellulitis (tachypnea, leukopenia), but dc summary stated the following:  'She had extensive work-up with CT head chest abdomen pelvis --no acute infectious etiology seen but notable diffuse metastasis disease'  No mention of cellulitis in dc summary    Treatment or Monitoring  Patient was made comfort care per family wishes.    Risk Factors  malignant neoplasm, resp failure, malnutrition, encephalopathy      [place] contact information  [email or web address]  Options provided:   -- sepsis due to cellulitis ruled in   -- sepsis due  to other/unspecified source (please specify source if known)   -- SIRS non-infectious etiology (No Sepsis)   -- unable to determine      Query created by: Denise Singleton on 5/12/2022 2:53 PM    RESPONSE TEXT:    sepsis due to cellulitis ruled in          Electronically signed by:  MIKEL GRIJALVA MD 5/17/2022 12:00 PM

## 2025-07-09 NOTE — TELEPHONE ENCOUNTER
Received request via: Pharmacy    Was the patient seen in the last year in this department? Yes    Does the patient have an active prescription (recently filled or refills available) for medication(s) requested? No  
Detail Level: Detailed

## (undated) DEVICE — SET LEADWIRE 5 LEAD BEDSIDE DISPOSABLE ECG (1SET OF 5/EA)

## (undated) DEVICE — KIT ANESTHESIA W/CIRCUIT & 3/LT BAG W/FILTER (20EA/CA)

## (undated) DEVICE — SENSOR SPO2 NEO LNCS ADHESIVE (20/BX) SEE USER NOTES

## (undated) DEVICE — SUTURE GENERAL

## (undated) DEVICE — MASK AIRWAY SIZE 3 UNIQUE SILICON (10/BX)

## (undated) DEVICE — LACTATED RINGERS INJ 1000 ML - (14EA/CA 60CA/PF)

## (undated) DEVICE — SPEAR EYE SPNG 3ANG MLBL HNDL - (10/ST18ST/PK 180/PK 1PK/SP)

## (undated) DEVICE — SUTURE 3-0 VICRYL PLUS SH - 8X 18 INCH (12/BX)

## (undated) DEVICE — CORDS BIPOLAR COAGULATION - 12FT STERILE DISP. (10EA/BX)

## (undated) DEVICE — BOVIE NEEDLE TIP 3CM COLORADO

## (undated) DEVICE — BANDAGE ROLL STERILE BULKEE 4.5 IN X 4 YD (100EA/CA)

## (undated) DEVICE — DRAPE LAPAROTOMY T SHEET - (12EA/CA)

## (undated) DEVICE — NEEDLE NON SAFETY 25 GA X 1 1/2 IN HYPO (100EA/BX)

## (undated) DEVICE — SUTURE 5-0 VICRYL PLUSRB-1 - 27 INCH (36/BX)

## (undated) DEVICE — PACK MINOR BASIN - (2EA/CA)

## (undated) DEVICE — SET EXTENSION WITH 2 PORTS (48EA/CA) ***PART #2C8610 IS A SUBSTITUTE*****

## (undated) DEVICE — MASK ANESTHESIA ADULT  - (100/CA)

## (undated) DEVICE — SENSOR SPO2 ADULT LNCS ADTX (20/BX) ORDER ITEM #19593

## (undated) DEVICE — ELECTRODE 850 FOAM ADHESIVE - HYDROGEL RADIOTRNSPRNT (50/PK)

## (undated) DEVICE — SUTURE 2-0 VICRYL PLUS CT-2 - 27 INCH (36/BX)

## (undated) DEVICE — WATER IRRIG. STER. 1500 ML - (9/CA)

## (undated) DEVICE — ELECTRODE DUAL RETURN W/ CORD - (50/PK)

## (undated) DEVICE — NEPTUNE 4 PORT MANIFOLD - (20/PK)

## (undated) DEVICE — PACK MAJOR BASIN - (2EA/CA)

## (undated) DEVICE — SLEEVE, VASO, THIGH, MED

## (undated) DEVICE — LIGASURE SM JAW SEALER CRVD - (6EA/CA)

## (undated) DEVICE — GLOVE BIOGEL PI INDICATOR SZ 7.0 SURGICAL PF LF - (50/BX 4BX/CA)

## (undated) DEVICE — KIT ROOM DECONTAMINATION

## (undated) DEVICE — HANDPIECE 10FT INTPLS SCT PLS IRRIGATION HAND CONTROL SET (6/PK)

## (undated) DEVICE — GLOVE BIOGEL SZ 8 SURGICAL PF LTX - (50PR/BX 4BX/CA)

## (undated) DEVICE — TUBING CLEARLINK DUO-VENT - C-FLO (48EA/CA)

## (undated) DEVICE — LEAD SET 6 DISP. EKG NIHON KOHDEN (100EA/CA) [9859].

## (undated) DEVICE — PAD LAP STERILE 18 X 18 - (5/PK 40PK/CA)

## (undated) DEVICE — GLOVE BIOGEL INDICATOR SZ 6.5 SURGICAL PF LTX - (50PR/BX 4BX/CA)

## (undated) DEVICE — BANDAGE ELASTIC STERILE MATRIX 6 X 10 (20EA/CA)

## (undated) DEVICE — SHEET TRANSVERSE LAP - (12EA/CA)

## (undated) DEVICE — DRESSING ABDOMINAL PAD STERILE 8 X 10" (360EA/CA)"

## (undated) DEVICE — DRESSING PETROLEUM GAUZE 5 X 9" (50EA/BX 4BX/CA)"

## (undated) DEVICE — SODIUM CHL IRRIGATION 0.9% 1000ML (12EA/CA)

## (undated) DEVICE — HEAD HOLDER JUNIOR/ADULT

## (undated) DEVICE — TOURNIQUET CUFF 18 X 3 ONE PORT DISP - STERILE (10/BX)

## (undated) DEVICE — GOWN SURGICAL XX-LARGE - (28EA/CA) SIRUS NON REINFORCED

## (undated) DEVICE — WIPE INSTRUMENT MICROWIPE (20EA/SP)

## (undated) DEVICE — TUBE E-T HI-LO CUFF 8.0MM (10EA/PK)

## (undated) DEVICE — Device

## (undated) DEVICE — BLADE SURGICAL #15 - (50/BX 3BX/CA)

## (undated) DEVICE — DETERGENT RENUZYME PLUS 10 OZ PACKET (50/BX)

## (undated) DEVICE — SYRINGE 10 ML CONTROL LL (25EA/BX 4BX/CA)

## (undated) DEVICE — PACK LOWER EXTREMITY - (2/CA)

## (undated) DEVICE — SUTURE 4-0 PROLENE PS-2 18 (36PK/BX)"

## (undated) DEVICE — GLOVE BIOGEL PI ORTHO SZ 6 1/2 SURGICAL PF LF (40PR/BX)

## (undated) DEVICE — DRESSING TRANSPARENT FILM TEGADERM 4 X 4.75" (50EA/BX)"

## (undated) DEVICE — COVER PROBE STERILE CONE (12EA/CA)

## (undated) DEVICE — SUTURE 4-0 VICRYL PLUS FS-2 - 27 INCH (36/BX)

## (undated) DEVICE — SUCTION INSTRUMENT YANKAUER BULBOUS TIP W/O VENT (50EA/CA)

## (undated) DEVICE — SODIUM CHL. IRRIGATION 0.9% 3000ML (4EA/CA 65CA/PF)

## (undated) DEVICE — PROTECTOR ULNA NERVE - (36PR/CA)

## (undated) DEVICE — TOWELS CLOTH SURGICAL - (4/PK 20PK/CA)

## (undated) DEVICE — GOWN WARMING STANDARD FLEX - (30/CA)

## (undated) DEVICE — CANISTER SUCTION 3000ML MECHANICAL FILTER AUTO SHUTOFF MEDI-VAC NONSTERILE LF DISP  (40EA/CA)

## (undated) DEVICE — DRAIN J-VAC 10MM FLAT - (10/CA)

## (undated) DEVICE — TUBE CONNECT SUCTION CLEAR 120 X 1/4" (50EA/CA)"

## (undated) DEVICE — GLOVE BIOGEL SZ 8.5 SURGICAL PF LTX - (50PR/BX 4BX/CA)

## (undated) DEVICE — SPONGE GAUZESTER 4 X 4 4PLY - (128PK/CA)

## (undated) DEVICE — SUTURE 6-0 PROLENE BV-1 D/A 30 (36PK/BX)"

## (undated) DEVICE — BOVIE BLADE COATED - (50/PK)

## (undated) DEVICE — TRAY SRGPRP PVP IOD WT PRP - (20/CA)

## (undated) DEVICE — TIP INTPLS HFLO ML ORFC BTRY - (12/CS)  FOR SURGILAV

## (undated) DEVICE — SUTURE 7-0 PROLENE 24BV175-6 - EP8735H (36/BX)"

## (undated) DEVICE — CHLORAPREP 26 ML APPLICATOR - ORANGE TINT(25/CA)

## (undated) DEVICE — DRAPE SURGICAL U 77X120 - (10/CA)

## (undated) DEVICE — RESERVOIR SUCTION 100 CC - SILICONE (20EA/CA)

## (undated) DEVICE — GLOVE COTTON STERILE (50/CA)

## (undated) DEVICE — GLOVE BIOGEL SZ 6.5 SURGICAL PF LTX (50PR/BX 4BX/CA)

## (undated) DEVICE — SUTURE 3-0 SUPRAMID - (12/BX)